# Patient Record
Sex: FEMALE | Race: WHITE | Employment: OTHER | URBAN - NONMETROPOLITAN AREA
[De-identification: names, ages, dates, MRNs, and addresses within clinical notes are randomized per-mention and may not be internally consistent; named-entity substitution may affect disease eponyms.]

---

## 2017-12-01 LAB
ALBUMIN SERPL-MCNC: 4.2 G/DL (ref 3.5–5.2)
ALP BLD-CCNC: 83 U/L (ref 35–104)
ALT SERPL-CCNC: 39 U/L (ref 5–33)
ANION GAP SERPL CALCULATED.3IONS-SCNC: 15 MMOL/L (ref 7–19)
AST SERPL-CCNC: 17 U/L (ref 5–32)
BILIRUB SERPL-MCNC: 0.7 MG/DL (ref 0.2–1.2)
BUN BLDV-MCNC: 10 MG/DL (ref 6–20)
CALCIUM SERPL-MCNC: 9.5 MG/DL (ref 8.6–10)
CHLORIDE BLD-SCNC: 104 MMOL/L (ref 98–111)
CO2: 26 MMOL/L (ref 22–29)
CREAT SERPL-MCNC: 0.9 MG/DL (ref 0.5–0.9)
GFR NON-AFRICAN AMERICAN: >60
GLUCOSE BLD-MCNC: 81 MG/DL (ref 74–109)
POTASSIUM SERPL-SCNC: 3.9 MMOL/L (ref 3.5–5)
SODIUM BLD-SCNC: 145 MMOL/L (ref 136–145)
TOTAL PROTEIN: 6.6 G/DL (ref 6.6–8.7)

## 2018-05-31 ENCOUNTER — OFFICE VISIT (OUTPATIENT)
Dept: NEUROLOGY | Age: 58
End: 2018-05-31
Payer: COMMERCIAL

## 2018-05-31 VITALS
SYSTOLIC BLOOD PRESSURE: 136 MMHG | BODY MASS INDEX: 34.2 KG/M2 | HEIGHT: 63 IN | DIASTOLIC BLOOD PRESSURE: 78 MMHG | WEIGHT: 193 LBS
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
## 2018-05-31 DIAGNOSIS — Z86.59 HISTORY OF DEPRESSION: ICD-10-CM

## 2018-05-31 PROCEDURE — 99204 OFFICE O/P NEW MOD 45 MIN: CPT | Performed by: PSYCHIATRY & NEUROLOGY

## 2018-05-31 RX ORDER — ALPRAZOLAM 0.5 MG/1
0.5 TABLET ORAL 2 TIMES DAILY
COMMUNITY

## 2018-05-31 RX ORDER — LEVOTHYROXINE SODIUM 0.05 MG/1
50 TABLET ORAL DAILY
COMMUNITY

## 2018-05-31 RX ORDER — MOMETASONE FUROATE 50 UG/1
2 SPRAY, METERED NASAL DAILY
COMMUNITY
End: 2018-09-14

## 2018-05-31 RX ORDER — BUPROPION HYDROCHLORIDE 150 MG/1
150 TABLET, EXTENDED RELEASE ORAL DAILY
COMMUNITY

## 2018-05-31 RX ORDER — DOXEPIN HYDROCHLORIDE 25 MG/1
25 CAPSULE ORAL NIGHTLY
COMMUNITY
End: 2018-09-14

## 2018-05-31 RX ORDER — DULOXETIN HYDROCHLORIDE 30 MG/1
30 CAPSULE, DELAYED RELEASE ORAL DAILY
COMMUNITY

## 2018-05-31 RX ORDER — BUSPIRONE HYDROCHLORIDE 5 MG/1
5 TABLET ORAL 2 TIMES DAILY
COMMUNITY
End: 2018-09-14

## 2018-05-31 RX ORDER — PREGABALIN 50 MG/1
50 CAPSULE ORAL 3 TIMES DAILY
COMMUNITY
End: 2018-09-14

## 2018-05-31 RX ORDER — IBUPROFEN 600 MG/1
600 TABLET ORAL EVERY 6 HOURS PRN
COMMUNITY
End: 2018-09-14

## 2018-05-31 RX ORDER — CLOTRIMAZOLE AND BETAMETHASONE DIPROPIONATE 10; .64 MG/G; MG/G
CREAM TOPICAL 2 TIMES DAILY
COMMUNITY
End: 2018-09-14

## 2018-05-31 RX ORDER — NICOTINE 21 MG/24HR
1 PATCH, TRANSDERMAL 24 HOURS TRANSDERMAL EVERY 24 HOURS
COMMUNITY
End: 2018-09-14

## 2018-07-13 ENCOUNTER — APPOINTMENT (OUTPATIENT)
Dept: GENERAL RADIOLOGY | Age: 58
End: 2018-07-13
Payer: MEDICARE

## 2018-07-13 ENCOUNTER — HOSPITAL ENCOUNTER (EMERGENCY)
Age: 58
Discharge: HOME | End: 2018-07-13
Attending: EMERGENCY MEDICINE
Payer: MEDICARE

## 2018-07-13 ENCOUNTER — OFFICE VISIT (OUTPATIENT)
Dept: URGENT CARE | Age: 58
End: 2018-07-13

## 2018-07-13 VITALS
DIASTOLIC BLOOD PRESSURE: 78 MMHG | SYSTOLIC BLOOD PRESSURE: 128 MMHG | WEIGHT: 195.6 LBS | TEMPERATURE: 98 F | BODY MASS INDEX: 33.39 KG/M2 | RESPIRATION RATE: 18 BRPM | OXYGEN SATURATION: 95 % | HEIGHT: 64 IN | HEART RATE: 120 BPM

## 2018-07-13 VITALS
TEMPERATURE: 98.2 F | BODY MASS INDEX: 34.55 KG/M2 | OXYGEN SATURATION: 95 % | HEART RATE: 74 BPM | HEIGHT: 63 IN | RESPIRATION RATE: 18 BRPM | SYSTOLIC BLOOD PRESSURE: 132 MMHG | WEIGHT: 195 LBS | DIASTOLIC BLOOD PRESSURE: 68 MMHG
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
## 2018-07-13 DIAGNOSIS — S89.92XA INJURY OF LEFT KNEE, INITIAL ENCOUNTER: ICD-10-CM

## 2018-07-13 DIAGNOSIS — N30.00 ACUTE CYSTITIS WITHOUT HEMATURIA: ICD-10-CM

## 2018-07-13 DIAGNOSIS — S81.012A: Primary | ICD-10-CM

## 2018-07-13 DIAGNOSIS — S81.012A LACERATION OF LEFT KNEE, INITIAL ENCOUNTER: ICD-10-CM

## 2018-07-13 LAB
BACTERIA: NEGATIVE /HPF
BILIRUBIN URINE: NEGATIVE
BLOOD, URINE: NEGATIVE
CLARITY: CLEAR
COLOR: YELLOW
EPITHELIAL CELLS, UA: 2 /HPF (ref 0–5)
GLUCOSE URINE: NEGATIVE MG/DL
HYALINE CASTS: 1 /HPF (ref 0–8)
KETONES, URINE: NEGATIVE MG/DL
LEUKOCYTE ESTERASE, URINE: ABNORMAL
NITRITE, URINE: NEGATIVE
PH UA: 5.5
PROTEIN UA: NEGATIVE MG/DL
RBC UA: 2 /HPF (ref 0–4)
SPECIFIC GRAVITY UA: 1.01
URINE REFLEX TO CULTURE: YES
UROBILINOGEN, URINE: 0.2 E.U./DL
WBC UA: 17 /HPF (ref 0–5)

## 2018-07-13 PROCEDURE — 73560 X-RAY EXAM OF KNEE 1 OR 2: CPT

## 2018-07-13 PROCEDURE — 12005 RPR S/N/A/GEN/TRK12.6-20.0CM: CPT | Performed by: EMERGENCY MEDICINE

## 2018-07-13 PROCEDURE — 87086 URINE CULTURE/COLONY COUNT: CPT

## 2018-07-13 PROCEDURE — 99283 EMERGENCY DEPT VISIT LOW MDM: CPT

## 2018-07-13 PROCEDURE — 2500000003 HC RX 250 WO HCPCS: Performed by: EMERGENCY MEDICINE

## 2018-07-13 PROCEDURE — 99999 PR OFFICE/OUTPT VISIT,PROCEDURE ONLY: CPT | Performed by: NURSE PRACTITIONER

## 2018-07-13 PROCEDURE — 87186 SC STD MICRODIL/AGAR DIL: CPT

## 2018-07-13 PROCEDURE — 81001 URINALYSIS AUTO W/SCOPE: CPT

## 2018-07-13 PROCEDURE — 12005 RPR S/N/A/GEN/TRK12.6-20.0CM: CPT

## 2018-07-13 PROCEDURE — 99284 EMERGENCY DEPT VISIT MOD MDM: CPT | Performed by: EMERGENCY MEDICINE

## 2018-07-13 PROCEDURE — 6360000002 HC RX W HCPCS: Performed by: EMERGENCY MEDICINE

## 2018-07-13 PROCEDURE — 6370000000 HC RX 637 (ALT 250 FOR IP): Performed by: EMERGENCY MEDICINE

## 2018-07-13 RX ORDER — OXYCODONE HYDROCHLORIDE 5 MG/1
5 TABLET ORAL ONCE
Status: COMPLETED | OUTPATIENT
Start: 2018-07-13 | End: 2018-07-13

## 2018-07-13 RX ORDER — CEFUROXIME AXETIL 250 MG/1
500 TABLET ORAL ONCE
Status: COMPLETED | OUTPATIENT
Start: 2018-07-13 | End: 2018-07-13

## 2018-07-13 RX ORDER — LIDOCAINE HYDROCHLORIDE AND EPINEPHRINE 10; 10 MG/ML; UG/ML
20 INJECTION, SOLUTION INFILTRATION; PERINEURAL ONCE
Status: COMPLETED | OUTPATIENT
Start: 2018-07-13 | End: 2018-07-13

## 2018-07-13 RX ORDER — CEFUROXIME AXETIL 500 MG/1
500 TABLET ORAL 2 TIMES DAILY
Qty: 14 TABLET | Refills: 0 | Status: SHIPPED | OUTPATIENT
Start: 2018-07-13 | End: 2018-07-20

## 2018-07-13 RX ADMIN — METHYLENE BLUE 50 MG: 5 INJECTION INTRAVENOUS at 12:50

## 2018-07-13 RX ADMIN — CEFUROXIME AXETIL 500 MG: 250 TABLET ORAL at 13:41

## 2018-07-13 RX ADMIN — LIDOCAINE HYDROCHLORIDE AND EPINEPHRINE 30 ML: 10; 10 INJECTION, SOLUTION INFILTRATION; PERINEURAL at 12:50

## 2018-07-13 RX ADMIN — OXYCODONE HYDROCHLORIDE 5 MG: 5 TABLET ORAL at 12:50

## 2018-07-13 ASSESSMENT — ENCOUNTER SYMPTOMS
DIARRHEA: 0
ABDOMINAL PAIN: 0
PHOTOPHOBIA: 0
RHINORRHEA: 0
VOMITING: 0
SHORTNESS OF BREATH: 0
COUGH: 0
CHEST TIGHTNESS: 0
NAUSEA: 0
EYE PAIN: 0
SORE THROAT: 0
BACK PAIN: 0

## 2018-07-13 ASSESSMENT — PAIN SCALES - GENERAL
PAINLEVEL_OUTOF10: 5
PAINLEVEL_OUTOF10: 3

## 2018-07-13 NOTE — ED PROVIDER NOTES
dysuria, flank pain and hematuria. Musculoskeletal: Positive for arthralgias. Negative for back pain, myalgias and neck pain. Skin: Positive for wound. Negative for rash. Neurological: Negative for dizziness, syncope, speech difficulty, weakness, light-headedness and headaches. Psychiatric/Behavioral: Negative for confusion, hallucinations and suicidal ideas. PAST MEDICAL HISTORY     Past Medical History:   Diagnosis Date    Anxiety     Depression     Fibromyalgia     Hypothyroidism     Insomnia        SURGICAL HISTORY       Past Surgical History:   Procedure Laterality Date    BREAST LUMPECTOMY      COLONOSCOPY      LITHOTRIPSY         CURRENT MEDICATIONS       Discharge Medication List as of 7/13/2018  3:05 PM      CONTINUE these medications which have NOT CHANGED    Details   doxepin (SINEQUAN) 25 MG capsule Take 25 mg by mouth nightlyHistorical Med      ibuprofen (ADVIL;MOTRIN) 600 MG tablet Take 600 mg by mouth every 6 hours as needed for PainHistorical Med      ALPRAZolam (XANAX) 0.5 MG tablet Take 0.5 mg by mouth 3 times daily. Clerance Riis Historical Med      pregabalin (LYRICA) 50 MG capsule Take 50 mg by mouth 3 times daily. Clerance Riis Historical Med      DULoxetine (CYMBALTA) 30 MG extended release capsule Take 30 mg by mouth dailyHistorical Med      clotrimazole-betamethasone (LOTRISONE) 1-0.05 % cream Apply topically 2 times daily Apply topically 2 times daily. , Topical, 2 TIMES DAILY, Historical Med      busPIRone (BUSPAR) 5 MG tablet Take 5 mg by mouth 2 times dailyHistorical Med      nicotine (NICODERM CQ) 21 MG/24HR Place 1 patch onto the skin every 24 hoursHistorical Med      buPROPion (WELLBUTRIN SR) 150 MG extended release tablet Take 150 mg by mouth dailyHistorical Med      levothyroxine (SYNTHROID) 50 MCG tablet Take 50 mcg by mouth DailyHistorical Med      mometasone (NASONEX) 50 MCG/ACT nasal spray 2 sprays by Nasal route daily, Nasal, DAILY, Historical Med             ALLERGIES Clindamycin/lincomycin    FAMILY HISTORY       Family History   Problem Relation Age of Onset    Heart Attack Sister     Diabetes Paternal Grandfather        SOCIAL HISTORY       Social History     Social History    Marital status:      Spouse name: N/A    Number of children: N/A    Years of education: N/A     Social History Main Topics    Smoking status: Current Every Day Smoker     Packs/day: 1.50     Types: Cigarettes    Smokeless tobacco: Never Used    Alcohol use No    Drug use: No    Sexual activity: Not Asked     Other Topics Concern    None     Social History Narrative    None       SCREENINGS           PHYSICAL EXAM    (up to 7 for level 4, 8 or more for level 5)     ED Triage Vitals [07/13/18 1154]   BP Temp Temp src Pulse Resp SpO2 Height Weight   125/60 98.2 °F (36.8 °C) -- 82 18 96 % 5' 3\" (1.6 m) 195 lb (88.5 kg)       Physical Exam   Constitutional: She is oriented to person, place, and time. She appears well-developed and well-nourished. No distress. HENT:   Head: Normocephalic and atraumatic. Mouth/Throat: Oropharynx is clear and moist.   No hemotympanum, no septal hematoma, no midface instability concerning for LeFort fracture. Eyes: EOM are normal. Pupils are equal, round, and reactive to light. No scleral icterus. Neck: Normal range of motion. Neck supple. No tracheal deviation present. No cervical spinous process tenderness, step-offs, deformities   Cardiovascular: Normal rate, regular rhythm, normal heart sounds and intact distal pulses. Exam reveals no gallop and no friction rub. No murmur heard. Pulmonary/Chest: Effort normal and breath sounds normal. No respiratory distress. She has no wheezes. She has no rales. She exhibits no tenderness. Abdominal: Soft. She exhibits no distension and no mass. There is no tenderness. There is no rebound and no guarding. Musculoskeletal: Normal range of motion. She exhibits tenderness (Around the knee).  She exhibits Options  Diagnosis management comments: 78-year-old female with a large laceration after a mechanical fall. Plan for x-ray to rule out open fracture. Joint injection with methylene blue to evaluate for any joint violation. Pain control and laceration repair. Patient tetanus up-to-date. ED Course  She was evaluated for her fall. She had an obvious approximately 20-25 cm horizontal laceration on her left knee. This was down to the fossa. The extensor mechanism was intact. X-rays were obtained to rule out any open fracture. No evidence of fracture. The knee was evaluated for violation of the joint space. No evidence of that. Wound was closed after copious irrigation. See procedure note for details. Patient was then given a knee immobilizer and had her wound dressed. Friend provided patient with a walker. She was found to have a urinary tract infection was started on antibiotics. Discharged home in improved condition. Patient is well-appearing, stable for discharge and follow-up without fail with his/her medical doctor. I had a detailed discussion with the patient and/or guardian regarding the historical points, exam findings, and any diagnostic results supporting the discharge diagnosis. The patient was educated on care and need for follow-up. Strict return instructions including red flag signs and symptoms were discussed with the patient. Medications for discharge discussed, and adverse effects reviewed. Questions invited and answered. Patient shows understanding of the discharge information and agrees to follow-up. CONSULTS:  None    PROCEDURES:  Unless otherwise noted below, none     Lac Repair  Date/Time: 7/13/2018 3:25 PM  Performed by: Onesimo Simmons  Authorized by: Onesimo Simmons     Comments: The wound was irrigated with 2 L of sterile saline, it was washed with Shur-Clens.  The patient was positioned in the seated position with her knee at 90° 3 mL of methylene blue or injected into the joint from the lateral inferior position. There was no leaking of methylene blue from the joint. The patient was then put in the supine position with her knee extended. He was anesthetized with 15 mL of 1% lidocaine with epinephrine. 13 3-0 nylon horizontal interrupted mattress sutures were placed to reapproximate the wound. Possibly 20-25 cm in length total He tolerated the procedure well. No immediate complications. The wound was dressed and the patient was put in a knee immobilizer. FINAL IMPRESSION      1. Fall, initial encounter    2. Laceration of left knee, initial encounter    3. Injury of left knee, initial encounter    4. Acute cystitis without hematuria          DISPOSITION/PLAN   DISPOSITION Decision To Discharge 07/13/2018 03:00:29 PM      PATIENT REFERRED TO:  Mima Cross [address]  [address] [facility]  [phone]    Call today  The next available appointment.       DISCHARGE MEDICATIONS:  Discharge Medication List as of 7/13/2018  3:05 PM      START taking these medications    Details   cefUROXime (CEFTIN) 500 MG tablet Take 1 tablet by mouth 2 times daily for 7 days, Disp-14 tablet, R-0Print                (Please note that portions of this note were completed with a voice recognition program.  Efforts were made to edit the dictations but occasionally words are mis-transcribed.)    Janki Lopez MD (electronically signed)  Attending Emergency Physician          Janki Lopez MD  07/13/18 5778

## 2018-07-13 NOTE — PROGRESS NOTES
Per patient, she fell in lobby of 1st floor and has a cut on her knee. Patient states that she has been falling a lot lately, was recently in the hospital for it. States that she did not trip or slip on anything in the main lobby. Per patient, did not hit her head or lose conciousness. Got patient's vital signs and took her to a room. Looked at laceration and called provider, Pedro Officer, into room d/t severity of laceration. Laceration was bandaged and the ambulance was called.  Patient was transported to E. via [facility], [facility]

## 2018-07-15 LAB
ORGANISM: ABNORMAL
URINE CULTURE, ROUTINE: ABNORMAL
URINE CULTURE, ROUTINE: ABNORMAL

## 2018-09-14 ENCOUNTER — OFFICE VISIT (OUTPATIENT)
Dept: NEUROLOGY | Age: 58
End: 2018-09-14
Payer: MEDICARE

## 2018-09-14 VITALS
WEIGHT: 196 LBS | SYSTOLIC BLOOD PRESSURE: 105 MMHG | HEIGHT: 64 IN | DIASTOLIC BLOOD PRESSURE: 62 MMHG | BODY MASS INDEX: 33.46 KG/M2
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
## 2018-09-14 DIAGNOSIS — G20 PARKINSONISM, UNSPECIFIED PARKINSONISM TYPE (HCC): ICD-10-CM

## 2018-09-14 DIAGNOSIS — R27.0 ATAXIA: ICD-10-CM

## 2018-09-14 PROCEDURE — G8417 CALC BMI ABV UP PARAM F/U: HCPCS | Performed by: PSYCHIATRY & NEUROLOGY

## 2018-09-14 PROCEDURE — G8427 DOCREV CUR MEDS BY ELIG CLIN: HCPCS | Performed by: PSYCHIATRY & NEUROLOGY

## 2018-09-14 PROCEDURE — 4004F PT TOBACCO SCREEN RCVD TLK: CPT | Performed by: PSYCHIATRY & NEUROLOGY

## 2018-09-14 PROCEDURE — 3017F COLORECTAL CA SCREEN DOC REV: CPT | Performed by: PSYCHIATRY & NEUROLOGY

## 2018-09-14 PROCEDURE — 99214 OFFICE O/P EST MOD 30 MIN: CPT | Performed by: PSYCHIATRY & NEUROLOGY

## 2018-09-14 RX ORDER — DOXEPIN HYDROCHLORIDE 10 MG/1
10 CAPSULE ORAL NIGHTLY
COMMUNITY
End: 2018-12-14

## 2018-09-14 RX ORDER — ATORVASTATIN CALCIUM 40 MG/1
40 TABLET, FILM COATED ORAL DAILY
COMMUNITY

## 2018-09-14 RX ORDER — PREGABALIN 75 MG/1
75 CAPSULE ORAL 3 TIMES DAILY
Qty: 30 CAPSULE | Refills: 2 | Status: SHIPPED | OUTPATIENT
Start: 2018-09-14 | End: 2018-12-14

## 2018-09-14 RX ORDER — OMEPRAZOLE 20 MG/1
20 CAPSULE, DELAYED RELEASE ORAL 2 TIMES DAILY
COMMUNITY

## 2018-09-14 RX ORDER — BUSPIRONE HYDROCHLORIDE 30 MG/1
30 TABLET ORAL DAILY
COMMUNITY

## 2018-09-14 RX ORDER — PREGABALIN 150 MG/1
150 CAPSULE ORAL NIGHTLY
COMMUNITY

## 2018-09-14 NOTE — PROGRESS NOTES
Chief Complaint   Patient presents with    3 Month Follow-Up     i have fallen 1 time since my last visit       Benjamin Brooke is a 59y.o. year old female who is seen for evaluation of Her recent falls and possibly PSP. Her mother had progressive supranuclear palsy and the patient shows some similar signs according to her son. Our patient however is not showing a slow steady decline necessarily but seems to have intermittent spurts of it. She has been falling for several years. More recently she is having trouble getting back on her feet. Doesn't appear to have any memory difficulties. Has chronic depression and took Seroquel for a few years but has been off of it for the past 2 or 3. She does have some slowness of movement and some urinary urgency but no clear shuffling gait. She had an MRI of the head which showed minimal white matter change only. No ventriculomegaly. She otherwise denies any focal signs or symptoms. Records are reviewed. We had a long talk regarding all the above. He has recently had several of her medications reduced and appears to be walking better and falling less. Takes Lyrica, Xanax, Sinequan, BuSpar and Cymbalta. She had a particularly bad fall in July of this year with a laceration to the left knee requiring 13 sutures. She really has no idea how she fell. He did not appear to pass out.   Active Ambulatory Problems     Diagnosis Date Noted    No Active Ambulatory Problems     Resolved Ambulatory Problems     Diagnosis Date Noted    No Resolved Ambulatory Problems     Past Medical History:   Diagnosis Date    Anxiety     Depression     Fibromyalgia     Hypothyroidism     Insomnia        Past Surgical History:   Procedure Laterality Date    BREAST LUMPECTOMY      COLONOSCOPY      LITHOTRIPSY         Family History   Problem Relation Age of Onset    Heart Attack Sister     Diabetes Paternal Grandfather        Allergies   Allergen Reactions    Clindamycin/Lincomycin Current Outpatient Prescriptions   Medication Sig Dispense Refill    atorvastatin (LIPITOR) 40 MG tablet Take 40 mg by mouth daily      doxepin (SINEQUAN) 10 MG capsule Take 10 mg by mouth nightly      busPIRone (BUSPAR) 30 MG tablet Take 30 mg by mouth daily Takes 1 1/5 pills twice daily      metFORMIN (GLUCOPHAGE) 500 MG tablet Take 500 mg by mouth 2 times daily (with meals)      omeprazole (PRILOSEC) 20 MG delayed release capsule Take 20 mg by mouth 2 times daily      pregabalin (LYRICA) 150 MG capsule Take 150 mg by mouth nightly. Santino Bolk aspirin 81 MG tablet Take 81 mg by mouth daily      pregabalin (LYRICA) 75 MG capsule Take 1 capsule by mouth 3 times daily for 30 days. . 30 capsule 2    ALPRAZolam (XANAX) 0.5 MG tablet Take 0.5 mg by mouth 2 times daily. Takes 1 tablet twice daily and 2 pills at night.  DULoxetine (CYMBALTA) 30 MG extended release capsule Take 30 mg by mouth daily Takes 2 pills AM and 1 pill at noon      buPROPion (WELLBUTRIN SR) 150 MG extended release tablet Take 150 mg by mouth daily Takes 2 pills AM and 1 pill at noon      levothyroxine (SYNTHROID) 50 MCG tablet Take 50 mcg by mouth Daily       No current facility-administered medications for this visit. Outpatient Prescriptions Marked as Taking for the 9/14/18 encounter (Office Visit) with Morris Ashraf MD   Medication Sig Dispense Refill    atorvastatin (LIPITOR) 40 MG tablet Take 40 mg by mouth daily      doxepin (SINEQUAN) 10 MG capsule Take 10 mg by mouth nightly      busPIRone (BUSPAR) 30 MG tablet Take 30 mg by mouth daily Takes 1 1/5 pills twice daily      metFORMIN (GLUCOPHAGE) 500 MG tablet Take 500 mg by mouth 2 times daily (with meals)      omeprazole (PRILOSEC) 20 MG delayed release capsule Take 20 mg by mouth 2 times daily      pregabalin (LYRICA) 150 MG capsule Take 150 mg by mouth nightly. Santino Bolk aspirin 81 MG tablet Take 81 mg by mouth daily      pregabalin (LYRICA) 75 MG capsule Take 1 capsule by mouth 3 times daily for 30 days. . 30 capsule 2    ALPRAZolam (XANAX) 0.5 MG tablet Take 0.5 mg by mouth 2 times daily. Takes 1 tablet twice daily and 2 pills at night.  DULoxetine (CYMBALTA) 30 MG extended release capsule Take 30 mg by mouth daily Takes 2 pills AM and 1 pill at noon      buPROPion (WELLBUTRIN SR) 150 MG extended release tablet Take 150 mg by mouth daily Takes 2 pills AM and 1 pill at noon      levothyroxine (SYNTHROID) 50 MCG tablet Take 50 mcg by mouth Daily         /62   Ht 5' 4\" (1.626 m)   Wt 196 lb (88.9 kg)   BMI 33.64 kg/m²       Constitutional  well developed, well nourished. Eyes  conjunctiva normal.  Pupils react to light  Ear, nose, throat -hearing intact to finger rub No scars, masses, or lesions over external nose or ears, no atrophy of tongue  Neck-symmetric, no masses noted, no jugular vein distension. No bruits noted. Respiration- chest wall appears symmetric, good expansion,   normal effort without use of accessory muscles  Cardiovascular- RRR  Musculoskeletal  no significant wasting of muscles noted, no bony deformities, gait no gross ataxia  Extremities-no clubbing, cyanosis or edema  Skin  warm, dry, and intact. No rash, erythema, or pallor. Psychiatric  mood, affect, and behavior appear normal.      Neurological exam  Awake, alert, fluent oriented x 3 appropriate affect  Attention and concentration appear appropriate  Recent and remote memory appears unremarkable  Speech normal without dysarthria  No clear issues with language of fund of knowledge    Cranial Nerve Exam   CN II- Visual fields grossly unremarkable. VA adequate. Discs sharp  CN III, IV,VI- PERRLA, EOMI, No nystagmus, conjugate eye movements, no ptosis  CN V-sensation intact to LT over face  CN VII-no facial asymmetry.  Masked face  CN VIII-Hearing intact   CN IX and X- Palate elevates in midline  CN XI-good shoulder shrug  CN XII-Tongue midline with no fasciculations or

## 2018-09-14 NOTE — PROGRESS NOTES

## 2018-09-21 ENCOUNTER — TRANSCRIBE ORDERS (OUTPATIENT)
Dept: PHYSICAL THERAPY | Facility: HOSPITAL | Age: 58
End: 2018-09-21

## 2018-09-21 DIAGNOSIS — M62.81 GENERALIZED MUSCLE WEAKNESS: Primary | ICD-10-CM

## 2018-10-03 ENCOUNTER — HOSPITAL ENCOUNTER (OUTPATIENT)
Dept: PHYSICAL THERAPY | Facility: HOSPITAL | Age: 58
Setting detail: THERAPIES SERIES
Discharge: HOME | End: 2018-10-03

## 2018-10-03 DIAGNOSIS — M62.81 GENERALIZED MUSCLE WEAKNESS: Primary | ICD-10-CM

## 2018-10-03 PROCEDURE — 97110 THERAPEUTIC EXERCISES: CPT | Performed by: PHYSICAL THERAPIST

## 2018-10-03 PROCEDURE — G8978 MOBILITY CURRENT STATUS: HCPCS | Performed by: PHYSICAL THERAPIST

## 2018-10-03 PROCEDURE — 97161 PT EVAL LOW COMPLEX 20 MIN: CPT | Performed by: PHYSICAL THERAPIST

## 2018-10-03 PROCEDURE — G8979 MOBILITY GOAL STATUS: HCPCS | Performed by: PHYSICAL THERAPIST

## 2018-10-03 NOTE — THERAPY EVALUATION
Outpatient Physical Therapy Ortho Initial Evaluation  [facility]     Patient Name: Denisa Michelle  : 1960  MRN: [id number]  Today's Date: 10/3/2018      Visit Date: 10/03/2018     Subjective Improvement: N/A      Attendance:   Approved:    Medicare guidelines       MD follow up:    LILIA DEMARCO date:    10/24/18       There is no problem list on file for this patient.       Past Medical History:   Diagnosis Date   • Breast cancer (CMS/HCC)    • Diabetes mellitus (CMS/HCC)    • Rhabdomyolysis         Past Surgical History:   Procedure Laterality Date   • BREAST LUMPECTOMY     • CHOLECYSTECTOMY       Allergies   Allergen Reactions   • Clindamycin/Lincomycin Hives     Medications:  Duloxetine  Atorvastatin  Bupropion  Doxepin  Buspirone  Levothyroxine  Alprazolam  Metformin  Omeprazole  Lyrica  Low dose aspirin      Visit Dx:     ICD-10-CM ICD-9-CM   1. Generalized muscle weakness M62.81 728.87             Patient History     Row Name 10/03/18 1100             History    Chief Complaint Balance Problems;Muscle weakness;Difficulty Walking  -KG      Date Current Problem(s) Began --   ~ 2 years ago  -KG      Brief Description of Current Complaint Pt present with increased hx of falls and overall balance deficits. Pt states a couple of times she has fallen in the bathroom and couldn't get up. Had to call EMS to help get her up. States that happened one time in May 2018 and [place] [facility]. Pt lives alone in a single level home, no steps to enter. States she does have trouble with ascending/descending stairs and has to use a railing. Pt states she does not use an AD.   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  -KG         Pain     What Performance Factors Make the  Current Problem(s) WORSE? Ascending/descending stairs, wakling at times due to impaired balance, gets tired easily while performing housework, etc.  -KG      Pain Comments Pt reports she just has generalized aches/pains in her hips. No pain currently. States she received a steroid shot for her shin splints yesterday and they feel better.  -KG      Is your sleep disturbed? No  -KG      Difficulties at work? N/A  -KG      Difficulties with ADL's? Independent  -KG      Difficulties with recreational activities? N/A  -KG         Fall Risk Assessment    Any falls in the past year: Yes  -KG      Number of falls reported in the last 12 months 6 falls since May; 8-9 total this year  -KG      Factors that contributed to the fall: Lost balance   Pt states she never knows when she's going to fall  -KG        User Key  (r) = Recorded By, (t) = Taken By, (c) = Cosigned By    Initials Name Provider Type    KG Christiana Schwarz, PT Physical Therapist                PT Ortho     Row Name 10/03/18 1100       Subjective Comments    Subjective Comments Refer to therapy pt history for details.   -KG       Precautions and Contraindications    Precautions/Limitations fall precautions  -KG       Subjective Pain    Able to rate subjective pain? yes  -KG    Pre-Treatment Pain Level 0  -KG       Posture/Observations    Posture/Observations Comments No acute distress. Ambulates with no AD, slight antalgic gait. Rounded shoulders, forward head posture. Pt demonstrates poor/fair skin integrity overall. Multiple skin tears on BUE's, bandages covering some.  -KG       General ROM    GENERAL ROM COMMENTS Bilateral hip & knee AROM WNL without increased pain  -KG       MMT (Manual Muscle Testing)    Rt Lower Ext Rt Hip Flexion;Rt Hip ABduction;Rt Hip ADduction;Rt Knee Extension;Rt Knee Flexion  -KG    Lt Lower Ext Lt Hip Flexion;Lt Hip ABduction;Lt Hip ADduction;Lt Knee Extension;Lt Knee Flexion  -KG       MMT Right Lower Ext    Rt Hip Flexion MMT,  "Gross Movement (4/5) good  -KG    Rt Hip ABduction MMT, Gross Movement (4-/5) good minus  -KG    Rt Hip ADduction MMT, Gross Movement (4/5) good  -KG    Rt Knee Extension MMT, Gross Movement (4+/5) good plus  -KG    Rt Knee Flexion MMT, Gross Movement (4+/5) good plus  -KG       MMT Left Lower Ext    Lt Hip Flexion MMT, Gross Movement (4/5) good  -KG    Lt Hip ABduction MMT, Gross Movement (4-/5) good minus  -KG    Lt Hip ADduction MMT, Gross Movement (4/5) good  -KG    Lt Knee Extension MMT, Gross Movement (4+/5) good plus  -KG    Lt Knee Flexion MMT, Gross Movement (4+/5) good plus  -KG       Sensation    Sensation WNL? WNL  -KG    Light Touch No apparent deficits  -KG       Balance Skills Training    Balance Comments FA/EO 30\", FA/EC 30\", FT/EO 30\", FT/EC 30\" increased post lean/sway. Airex: FA/EC & FA/EO 30\", FT/EO 30\", FT/EC 25\" mod sway. Tandem R/L lead 30\", airex tandem L lead 15\", R lead 12\"  -KG      User Key  (r) = Recorded By, (t) = Taken By, (c) = Cosigned By    Initials Name Provider Type    Christiana Mccracken, PT Physical Therapist                      Therapy Education  Education Details: POC education. HEP: sit to stand, standing CR/TR, standing march, standing hip abd  Given: HEP, Symptoms/condition management, Posture/body mechanics, Fall prevention and home safety, Mobility training  Program: New  How Provided: Verbal, Demonstration, Written  Provided to: Patient  Level of Understanding: Teach back education performed, Verbalized, Demonstrated        PT OP Goals     Row Name 10/03/18 1100          PT Short Term Goals    STG Date to Achieve 10/24/18  -KG     STG 1 Independent/compliant with HEP  -KG     STG 1 Progress New  -KG     STG 2 Tolerate 45 minute treatment session without increased pain  -KG     STG 2 Progress New  -KG     STG 3 Improve DGI score to 20/24 to demonstrate decreased fall risk and improved safety with functional mobility  -KG     STG 3 Progress New  -KG     STG 4 Demonstrate " "bilateral hip flex MMT to 4+/5  -KG     STG 4 Progress New  -KG     STG 5 Demonstrate airex FT/EC static stance for 30\" or greater  -KG     STG 5 Progress New  -KG        Long Term Goals    LTG Date to Achieve 11/07/18  -KG     LTG 1 Subjectively report 60% improvement or greater  -KG     LTG 1 Progress New  -KG     LTG 2 Improve DGI score to 22/24 to demonstrate decreased fall risk and improved safety with functional mobility  -KG     LTG 2 Progress New  -KG     LTG 3 Perform Tandem R/L leading on airex for 25\" or greater  -KG     LTG 3 Progress New  -KG     LTG 4 Demonstrate bilateral hip abd MMT to 4+5 or greater  -KG     LTG 4 Progress New  -KG     LTG 5 Ascend/descend 5 steps x 5, reciprocal pattern, min to no UE handrail assist  -KG     LTG 5 Progress New  -KG        Time Calculation    PT Goal Re-Cert Due Date 10/24/18  -KG       User Key  (r) = Recorded By, (t) = Taken By, (c) = Cosigned By    Initials Name Provider Type    KG Christiana Schwarz, PT Physical Therapist               PT Assessment/Plan     Row Name 10/03/18 1100          PT Assessment    Functional Limitations Decreased safety during functional activities;Impaired locomotion;Limitation in home management;Limitations in community activities;Limitations in functional capacity and performance;Performance in leisure activities  -KG     Impairments Balance;Gait;Impaired flexibility;Impaired muscle endurance;Muscle strength;Posture;Pain  -KG     Assessment Comments Pt is a 58 y.o. female presenting with hx of frequent falls due to balance/gait deficits. Pt reports she has fallen 6 times since May 2018. Pt spent time at [facility] swing bed unit due to weakness/dx of rhabdomyolysis during the month of May. Pt presents with an overall decrease in BLE strength & endurance/activity tolerance, limiting performance of functional mobility. Pt will benefit from skilled PT services to address these deficits for improvements in functional strength, balance, & " performance with functional mobility to decrease fall risk and return pt to highest level of function.  -KG     Please refer to paper survey for additional self-reported information Yes  -KG     Rehab Potential Good  -KG     Patient/caregiver participated in establishment of treatment plan and goals Yes  -KG     Patient would benefit from skilled therapy intervention Yes  -KG        PT Plan    PT Frequency 2x/week  -KG     Predicted Duration of Therapy Intervention (Therapy Eval) 4-6 weeks  -KG     Planned CPT's? PT EVAL LOW COMPLEXITY: 77813;PT RE-EVAL: 07401;PT THER PROC EA 15 MIN: 48953;PT THER ACT EA 15 MIN: 24112;PT MANUAL THERAPY EA 15 MIN: 01597;PT NEUROMUSC RE-EDUCATION EA 15 MIN: 89803;PT SELF CARE/HOME MGMT/TRAIN EA 15: 94435;PT THER SUPP EA 15 MIN  -KG     Physical Therapy Interventions (Optional Details) balance training;gait training;home exercise program;lumbar stabilization;manual therapy techniques;modalities;neuromuscular re-education;patient/family education;postural re-education;strengthening  -KG     PT Plan Comments Overall bilateral LE strengthening, dynamic balance activities to include obstacle course negotiation & dual task activities. Improve standing functional stability.  -KG       User Key  (r) = Recorded By, (t) = Taken By, (c) = Cosigned By    Initials Name Provider Type    KG Christiana Schwarz, PT Physical Therapist                Modalities     Row Name 10/03/18 1100             Subjective Pain    Post-Treatment Pain Level 0  -KG        User Key  (r) = Recorded By, (t) = Taken By, (c) = Cosigned By    Initials Name Provider Type    KG Christiana Schwarz, PT Physical Therapist              Exercises     Row Name 10/03/18 1100             Subjective Comments    Subjective Comments Refer to therapy pt history for details.   -KG         Subjective Pain    Able to rate subjective pain? yes  -KG      Pre-Treatment Pain Level 0  -KG      Post-Treatment Pain Level 0  -KG         Aquatics     Aquatics performed? No  -KG         Exercise 1    Exercise Name 1 Sit to stand no UE support; airex in chair  -KG      Cueing 1 Verbal  -KG      Sets 1 2  -KG      Reps 1 10  -KG         Exercise 2    Exercise Name 2 Standing CR/TR  -KG      Cueing 2 Verbal  -KG      Sets 2 2  -KG      Reps 2 10  -KG         Exercise 3    Exercise Name 3 Standing marching  -KG      Cueing 3 Verbal  -KG      Sets 3 2  -KG      Reps 3 10  -KG         Exercise 4    Exercise Name 4 Standing hip abd  -KG      Cueing 4 Verbal  -KG      Sets 4 2  -KG      Reps 4 10  -KG        User Key  (r) = Recorded By, (t) = Taken By, (c) = Cosigned By    Initials Name Provider Type    Christiana Mccracken, PT Physical Therapist                        Outcome Measure Options: Lower Extremity Functional Scale (LEFS), Dynamic Gait Index  Dynamic Gait Index (DGI)  Gait Level Surface: Normal: walks 20’, no assistive devices, good speed, no evidence for imbalance, normal gait pattern  Change in Gait Speed: Normal: Able to smoothly change walking speed without loss of balance or gait deviation. Shows significant difference in walking speeds between normal, fast and slow paces.  Gait with Horizontal Head Turns: Mild impairment: Performs head turns smoothly with slight change in gait velocity, i.e. minor disruption to smooth gait path or uses walking aid.  Gait with Vertical Head Turns: Mild impairment: Performs head turns smoothly with slight change in gait velocity, i.e. minor disruption to smooth gait path or uses walking aid.  Gait and Pivot Turn: Mild impairment: pivot turns safely in >3 seconds and stops with no loss of balance.  Step Over Obstacle: Mild impairment: Is able to step over shoe box, but must slow down and adjust steps to clear box safely.  Step Around Obstacles: Mild impairment: Is able to step around both cones, but must slow down and adjust steps to clear cones.  Steps: Mild impairment: Alternating feet, must use rail.  Dynamic Gait Index  Score: 18  Lower Extremity Functional Index  Any of your usual work, housework or school activities: Quite a bit of difficulty  Your usual hobbies, recreational or sporting activities: Moderate difficulty  Getting into or out of the bath: Moderate difficulty  Walking between rooms: No difficulty  Putting on your shoes or socks: Quite a bit of difficulty  Squatting: Extreme difficulty or unable to perform activity  Lifting an object, like a bag of groceries from the floor: Quite a bit of difficulty  Performing light activities around your home: Moderate difficulty  Performing heavy activities around your home: Extreme difficulty or unable to perform activity  Getting into or out of a car: Moderate difficulty  Walking 2 blocks: Quite a bit of difficulty  Walking a mile: Extreme difficulty or unable to perform activity  Going up or down 10 stairs (about 1 flight of stairs): Extreme difficulty or unable to perform activity  Standing for 1 hour: Quite a bit of difficulty  Sitting for 1 hour: Extreme difficulty or unable to perform activity  Running on even ground: Extreme difficulty or unable to perform activity  Running on uneven ground: Extreme difficulty or unable to perform activity  Making sharp turns while running fast: Extreme difficulty or unable to perform activity  Hopping: Extreme difficulty or unable to perform activity  Rolling over in bed: Moderate difficulty  Total: 19      Time Calculation:     Therapy Suggested Charges     Code   Minutes Charges    None             Start Time: 1108  Stop Time: 1156  Time Calculation (min): 48 min  Total Timed Code Minutes- PT: 15 minute(s)     Therapy Charges for Today     Code Description Service Date Service Provider Modifiers Qty    67985368001 HC PT MOBILITY CURRENT 10/3/2018 Christiana Schwarz, PT GP, CK 1    00109443851 HC PT MOBILITY PROJECTED 10/3/2018 Christiana Schwarz, PT GP, CJ 1    40261483697 HC PT EVAL LOW COMPLEXITY 2 10/3/2018 Christiana Schwarz, PT GP 1     [id number]  PT THER PROC EA 15 MIN 10/3/2018 Christiana Schwarz, PT GP 1          PT G-Codes  PT Professional Judgement Used?: Yes  Outcome Measure Options: Lower Extremity Functional Scale (LEFS), Dynamic Gait Index  Total: 19  Functional Limitation: Mobility: Walking and moving around  Mobility: Walking and Moving Around Current Status (): At least 40 percent but less than 60 percent impaired, limited or restricted  Mobility: Walking and Moving Around Goal Status (): At least 20 percent but less than 40 percent impaired, limited or restricted         Christiana Schwarz, PT  10/3/2018

## 2018-10-10 ENCOUNTER — HOSPITAL ENCOUNTER (OUTPATIENT)
Dept: PHYSICAL THERAPY | Facility: HOSPITAL | Age: 58
Setting detail: THERAPIES SERIES
Discharge: HOME | End: 2018-10-10

## 2018-10-10 DIAGNOSIS — M62.81 GENERALIZED MUSCLE WEAKNESS: Primary | ICD-10-CM

## 2018-10-10 PROCEDURE — 97110 THERAPEUTIC EXERCISES: CPT

## 2018-10-10 NOTE — THERAPY TREATMENT NOTE
Outpatient Physical Therapy Ortho Treatment Note  [facility]     Patient Name: Denisa Michelle  : 1960  MRN: [id number]  Today's Date: 10/10/2018      Visit Date: 10/10/2018  Visits . Recert 10/24. MD augustin/jalil RODRIGUES. % improvement.   Visit Dx:    ICD-10-CM ICD-9-CM   1. Generalized muscle weakness M62.81 728.87       There is no problem list on file for this patient.       Past Medical History:   Diagnosis Date   • Breast cancer (CMS/HCC)    • Diabetes mellitus (CMS/HCC)    • Rhabdomyolysis         Past Surgical History:   Procedure Laterality Date   • BREAST LUMPECTOMY     • CHOLECYSTECTOMY                               PT Assessment/Plan     Row Name 10/10/18 1500          PT Assessment    Functional Limitations Decreased safety during functional activities;Impaired locomotion;Limitation in home management;Limitations in community activities;Limitations in functional capacity and performance;Performance in leisure activities  -     Impairments Balance;Gait;Impaired flexibility;Impaired muscle endurance;Muscle strength;Posture;Pain  -     Assessment Comments Good miko of today's ther ex with increased intensity. No complaints during treatment.   -     Rehab Potential Good  -     Patient/caregiver participated in establishment of treatment plan and goals Yes  -     Patient would benefit from skilled therapy intervention Yes  -JW        PT Plan    PT Frequency 2x/week  -     Predicted Duration of Therapy Intervention (Therapy Eval) 4-6 weeks  -     PT Plan Comments Cpnt PT per POC.  -       User Key  (r) = Recorded By, (t) = Taken By, (c) = Cosigned By    Initials Name Provider Type    Dav Peterson PTA Physical Therapy Assistant                Modalities     Row Name 10/10/18 1400             Subjective Pain    Post-Treatment Pain Level 6  -        User Key  (r) = Recorded By, (t) = Taken By, (c) = Cosigned By    Initials Name Provider Type    Dav Peterson,  "PTA Physical Therapy Assistant                Exercises     Row Name 10/10/18 1400             Subjective Comments    Subjective Comments Pt c/o pain at R LB, thighs, and shins.   -JW         Subjective Pain    Able to rate subjective pain? yes  -JW      Pre-Treatment Pain Level 6  -JW      Post-Treatment Pain Level 6  -JW         Aquatics    Aquatics performed? No  -JW         Exercise 1    Exercise Name 1 PRO2  -JW      Time 1 5'  -JW         Exercise 2    Exercise Name 2 SLR  -JW      Sets 2 2  -JW      Reps 2 10  -JW         Exercise 3    Exercise Name 3 H/L hip add squeeze  -JW      Reps 3 20  -JW      Time 3 2-3\"  -JW         Exercise 4    Exercise Name 4 Bridges  -JW      Sets 4 2  -JW      Reps 4 10  -JW         Exercise 5    Exercise Name 5 Clamshells  -JW      Reps 5 20  -JW         Exercise 6    Exercise Name 6 Sit-stands  -JW      Sets 6 2  -JW      Reps 6 10  -JW      Additional Comments airex on chair  -JW         Exercise 7    Exercise Name 7 ST hip abd, ext  -JW      Sets 7 2  -JW      Reps 7 10x ea  -JW         Exercise 8    Exercise Name 8 CR  -JW      Reps 8 25  -JW         Exercise 9    Exercise Name 9 Airex: balance with EC  -JW      Sets 9 2  -JW      Time 9 30\"  -JW        User Key  (r) = Recorded By, (t) = Taken By, (c) = Cosigned By    Initials Name Provider Type    JW Dav Yuen, RAMONA Physical Therapy Assistant                               PT OP Goals     Row Name 10/10/18 1511 10/10/18 1500       PT Short Term Goals    STG Date to Achieve  -- 10/24/18  -JW    STG 1  -- Independent/compliant with HEP  -JW    STG 1 Progress  -- Ongoing  -JW    STG 2  -- Tolerate 45 minute treatment session without increased pain  -JW    STG 2 Progress  -- Ongoing  -JW    STG 3  -- Improve DGI score to 20/24 to demonstrate decreased fall risk and improved safety with functional mobility  -JW    STG 3 Progress  -- Ongoing  -JW    STG 4  -- Demonstrate bilateral hip flex MMT to 4+/5  -JW    STG 4 Progress " " -- Ongoing  -    STG 5  -- Demonstrate airex FT/EC static stance for 30\" or greater  -    STG 5 Progress  -- Ongoing  -       Long Term Goals    LTG Date to Achieve  -- 11/07/18  -    LTG 1  -- Subjectively report 60% improvement or greater  -    LTG 1 Progress  -- Ongoing  -    LTG 2  -- Improve DGI score to 22/24 to demonstrate decreased fall risk and improved safety with functional mobility  -    LT 2 Progress  -- Ongoing  -    LTG 3  -- Perform Tandem R/L leading on airex for 25\" or greater  -    LT 3 Progress  -- Ongoing  -    LTG 4  -- Demonstrate bilateral hip abd MMT to 4+5 or greater  -[facility] 4 Progress  -- Ongoing  -    LTG 5  -- Ascend/descend 5 steps x 5, reciprocal pattern, min to no UE handrail assist  -    LT 5 Progress  -- Ongoing  -       Time Calculation    PT Goal Re-Cert Due Date 10/24/18  -  --      User Key  (r) = Recorded By, (t) = Taken By, (c) = Cosigned By    Initials Name Provider Type     Dav Yuen PTA Physical Therapy Assistant                         Time Calculation:   Start Time: 1428  Stop Time: 1508  Time Calculation (min): 40 min  Total Timed Code Minutes- PT: 40 minute(s)  Therapy Suggested Charges     Code   Minutes Charges    None           Therapy Charges for Today     Code Description Service Date Service Provider Modifiers Qty    72751743927 HC PT THER PROC EA 15 MIN 10/10/2018 Dav Yuen PTA GP 3                    Dav Yuen PTA  10/10/2018     "

## 2018-10-11 ENCOUNTER — APPOINTMENT (OUTPATIENT)
Dept: PHYSICAL THERAPY | Facility: HOSPITAL | Age: 58
End: 2018-10-11

## 2018-10-16 ENCOUNTER — APPOINTMENT (OUTPATIENT)
Dept: PHYSICAL THERAPY | Facility: HOSPITAL | Age: 58
End: 2018-10-16

## 2018-10-19 ENCOUNTER — HOSPITAL ENCOUNTER (OUTPATIENT)
Dept: PHYSICAL THERAPY | Facility: HOSPITAL | Age: 58
Setting detail: THERAPIES SERIES
Discharge: HOME | End: 2018-10-19

## 2018-10-19 DIAGNOSIS — M62.81 GENERALIZED MUSCLE WEAKNESS: Primary | ICD-10-CM

## 2018-10-19 PROCEDURE — 97110 THERAPEUTIC EXERCISES: CPT

## 2018-10-19 NOTE — PROGRESS NOTES
Outpatient Physical Therapy Ortho Treatment Note  [facility]     Patient Name: Denisa Michelle  : 1960  MRN: [id number]  Today's Date: 10/19/2018      Visit Date: 10/19/2018    Subjective Improvement:    None yet   Attendance: 3/3  Approved:   Per Medicare        MD follow up:    LILIA DEMARCO date:     10-24-18    Visit Dx:    ICD-10-CM ICD-9-CM   1. Generalized muscle weakness M62.81 728.87       There is no problem list on file for this patient.       Past Medical History:   Diagnosis Date   • Breast cancer (CMS/HCC)    • Diabetes mellitus (CMS/[facility])    • Rhabdomyolysis         Past Surgical History:   Procedure Laterality Date   • BREAST LUMPECTOMY     • CHOLECYSTECTOMY               PT Ortho     Row Name 10/19/18 0700       Subjective Comments    Subjective Comments Pt reports having some back pain today.    -TM       Precautions and Contraindications    Precautions/Limitations fall precautions  -TM       Subjective Pain    Able to rate subjective pain? yes  -TM    Pre-Treatment Pain Level 5  -TM    Post-Treatment Pain Level 7  -TM      User Key  (r) = Recorded By, (t) = Taken By, (c) = Cosigned By    Initials Name Provider Type    TM Xiao Maddox PTA Physical Therapy Assistant                            PT Assessment/Plan     Row Name 10/19/18 0800          PT Assessment    Assessment Comments Pt tolerated therex progression well.  Did report min increase in back pain.  Able to perform sit to stand without cushion in chair.    -TM     Patient/caregiver participated in establishment of treatment plan and goals Yes  -TM        PT Plan    PT Frequency 2x/week  -TM     Predicted Duration of Therapy Intervention (Therapy Eval) 4-6 weeks  -TM     PT Plan Comments Airex tandem stance for goal attainment  -TM       User Key  (r) = Recorded By, (t) = Taken By, (c) = Cosigned By    Initials Name Provider Type    Xiao Mahoney PTA Physical Therapy Assistant                "     Exercises     Row Name 10/19/18 0700             Subjective Comments    Subjective Comments Pt reports having some back pain today.    -TM         Subjective Pain    Able to rate subjective pain? yes  -TM      Pre-Treatment Pain Level 5  -TM      Post-Treatment Pain Level 7  -TM         Exercise 1    Exercise Name 1 PRO II for ROM/endurance  -TM      Time 1 6 min  -TM      Additional Comments L 2  -TM         Exercise 2    Exercise Name 2 SLR  -TM      Sets 2 2  -TM      Reps 2 10  -TM         Exercise 3    Exercise Name 3 H/L hip add squeeze  -TM      Reps 3 20  -TM         Exercise 4    Exercise Name 4 Bridges  -TM      Sets 4 2  -TM      Reps 4 10  -TM         Exercise 5    Exercise Name 5 T Band hip abd  -TM      Sets 5 2  -TM      Reps 5 10  -TM      Additional Comments red  -TM         Exercise 6    Exercise Name 6 Airex CR/TR  -TM      Reps 6 20  -TM         Exercise 7    Exercise Name 7 Airex mini squats  -TM      Sets 7 2  -TM      Reps 7 10  -TM         Exercise 8    Exercise Name 8 Airex stance FT/EC  -TM      Reps 8 2  -TM      Time 8 30\"  -TM         Exercise 9    Exercise Name 9 Airex step ups fwd  -TM      Reps 9 20  -TM         Exercise 10    Exercise Name 10 standing 3 way hip  -TM      Sets 10 2  -TM      Reps 10 10  -TM         Exercise 11    Exercise Name 11 sidestepping in hallway  -TM      Reps 11 1 lap  -TM         Exercise 12    Exercise Name 12 retro gait  -TM      Reps 12 1 lap  -TM         Exercise 13    Exercise Name 13 high knee march  -TM      Reps 13 1 lap  -TM         Exercise 14    Exercise Name 14 sit <-> stand from chair  -TM      Sets 14 2  -TM      Reps 14 10  -TM      Additional Comments no HHA  -TM         Exercise 15    Exercise Name 15 T Band HS pulls  -TM      Sets 15 2  -TM      Reps 15 10  -TM      Additional Comments red  -TM         Exercise 16    Exercise Name 16 Airex beam tandem gait  -TM      Reps 16 4 laps  -TM      Additional Comments intermittant HHA  -TM   " "     User Key  (r) = Recorded By, (t) = Taken By, (c) = Cosigned By    Initials Name Provider Type     Xiao Maddox PTA Physical Therapy Assistant                               PT OP Goals     Row Name 10/19/18 0800          PT Short Term Goals    STG Date to Achieve 10/24/18  -TM     STG 1 Independent/compliant with HEP  -TM     STG 1 Progress Ongoing  -TM     STG 2 Tolerate 45 minute treatment session without increased pain  -TM     STG 2 Progress Ongoing  -TM     STG 3 Improve DGI score to 20/24 to demonstrate decreased fall risk and improved safety with functional mobility  -TM     STG 3 Progress Ongoing  -TM     STG 4 Demonstrate bilateral hip flex MMT to 4+/5  -TM     STG 4 Progress Ongoing  -     STG 5 Demonstrate airex FT/EC static stance for 30\" or greater  -TM     STG 5 Progress Met  -        Long Term Goals    LTG Date to Achieve 11/07/18  -TM     LTG 1 Subjectively report 60% improvement or greater  -     LTG 1 Progress Ongoing  -     LTG 2 Improve DGI score to 22/24 to demonstrate decreased fall risk and improved safety with functional mobility  -     LTG 2 Progress Ongoing  -     LTG 3 Perform Tandem R/L leading on airex for 25\" or greater  -TM     LTG 3 Progress Ongoing  -     LTG 4 Demonstrate bilateral hip abd MMT to 4+5 or greater  -TM     LTG 4 Progress Ongoing  -     LTG 5 Ascend/descend 5 steps x 5, reciprocal pattern, min to no UE handrail assist  -     LTG 5 Progress Ongoing  -       User Key  (r) = Recorded By, (t) = Taken By, (c) = Cosigned By    Initials Name Provider Type     Xiao Maddox PTA Physical Therapy Assistant                         Time Calculation:   Start Time: 0800  Stop Time: 0845  Time Calculation (min): 45 min  Total Timed Code Minutes- PT: 45 minute(s)  Therapy Suggested Charges     Code   Minutes Charges    None           Therapy Charges for Today     Code Description Service Date Service Provider Modifiers Qty    93808413017  PT " THER PROC EA 15 MIN 10/19/2018 Xiao Maddox, PTA GP 3                    Xiao Maddox, PTA  10/19/2018

## 2018-10-22 ENCOUNTER — HOSPITAL ENCOUNTER (OUTPATIENT)
Dept: PHYSICAL THERAPY | Facility: HOSPITAL | Age: 58
Setting detail: THERAPIES SERIES
Discharge: HOME | End: 2018-10-22

## 2018-10-22 DIAGNOSIS — M62.81 GENERALIZED MUSCLE WEAKNESS: Primary | ICD-10-CM

## 2018-10-22 PROCEDURE — G8978 MOBILITY CURRENT STATUS: HCPCS | Performed by: PHYSICAL THERAPIST

## 2018-10-22 PROCEDURE — 97110 THERAPEUTIC EXERCISES: CPT | Performed by: PHYSICAL THERAPIST

## 2018-10-22 PROCEDURE — G8979 MOBILITY GOAL STATUS: HCPCS | Performed by: PHYSICAL THERAPIST

## 2018-10-22 NOTE — THERAPY PROGRESS REPORT/RE-CERT
Outpatient Physical Therapy Ortho Progress Note  [facility]     Patient Name: Denisa Michelle  : 1960  MRN: [id number]  Today's Date: 10/22/2018      Visit Date: 10/22/2018     Subjective Improvement:   60%    Attendance:   Approved:   Medicare guidelines        MD follow up:    LILIA DEMARCO date:     18      There is no problem list on file for this patient.       Past Medical History:   Diagnosis Date   • Breast cancer (CMS/HCC)    • Diabetes mellitus (CMS/[facility])    • Rhabdomyolysis         Past Surgical History:   Procedure Laterality Date   • BREAST LUMPECTOMY     • CHOLECYSTECTOMY         Visit Dx:     ICD-10-CM ICD-9-CM   1. Generalized muscle weakness M62.81 728.87                 PT Ortho     Row Name 10/22/18 1400       Precautions and Contraindications    Precautions/Limitations fall precautions  -KG       Subjective Pain    Pre-Treatment Pain Level 6  -KG       Posture/Observations    Posture/Observations Comments No acute distress. Ambulates with no AD, non-antalgic gait. Rounded shoulders posture.  -KG       General ROM    GENERAL ROM COMMENTS Bilateral hip & knee AROM WNL without increased pain  -KG       MMT Right Lower Ext    Rt Hip Flexion MMT, Gross Movement (4+/5) good plus  -KG    Rt Hip ABduction MMT, Gross Movement (4-/5) good minus  -KG    Rt Hip ADduction MMT, Gross Movement (4+/5) good plus  -KG    Rt Knee Extension MMT, Gross Movement (4+/5) good plus  -KG    Rt Knee Flexion MMT, Gross Movement (4+/5) good plus  -KG       MMT Left Lower Ext    Lt Hip Flexion MMT, Gross Movement (4+/5) good plus  -KG    Lt Hip ABduction MMT, Gross Movement (4/5) good  -KG    Lt Hip ADduction MMT, Gross Movement (4+/5) good plus  -KG    Lt Knee Extension MMT, Gross Movement (4+/5) good plus  -KG    Lt Knee Flexion MMT, Gross Movement (4+/5) good plus  -KG       Sensation    Sensation WNL? WNL  -KG    Light Touch No apparent deficits  -KG       Balance Skills Training     "Balance Comments Airex FT/EC 30\", tandem stance R & L leading 30\" with increased sway but no LOB.  -KG      User Key  (r) = Recorded By, (t) = Taken By, (c) = Cosigned By    Initials Name Provider Type    Christiana Mccracken, PT Physical Therapist                      Therapy Education  Education Details: Added bridges and HL hip abd; continue current HEP  Given: HEP, Symptoms/condition management, Posture/body mechanics, Fall prevention and home safety, Mobility training  Program: Progressed  How Provided: Verbal, Demonstration, Written  Provided to: Patient  Level of Understanding: Teach back education performed, Verbalized, Demonstrated           PT OP Goals     Row Name 10/22/18 1400          PT Short Term Goals    STG Date to Achieve 10/29/18  -KG     STG 1 Independent/compliant with HEP  -KG     STG 1 Progress Met;Ongoing  -KG     STG 2 Tolerate 45 minute treatment session without increased pain  -KG     STG 2 Progress Partially Met;Ongoing  -KG     STG 3 Improve DGI score to 20/24 to demonstrate decreased fall risk and improved safety with functional mobility  -KG     STG 3 Progress Met  -KG     STG 4 Demonstrate bilateral hip flex MMT to 4+/5  -KG     STG 4 Progress Ongoing  -KG     STG 5 Demonstrate airex FT/EC static stance for 30\" or greater  -KG     STG 5 Progress Met  -KG        Long Term Goals    LTG Date to Achieve 11/12/18  -KG     LTG 1 Subjectively report 60% improvement or greater  -KG     LTG 1 Progress Met  -KG     LTG 2 Improve DGI score to 22/24 to demonstrate decreased fall risk and improved safety with functional mobility  -KG     LTG 2 Progress Ongoing  -KG     LTG 3 Perform Tandem R/L leading on airex for 25\" or greater  -KG     LTG 3 Progress Met  -KG     LTG 4 Demonstrate bilateral hip abd MMT to 4+5 or greater  -KG     LTG 4 Progress Ongoing  -KG     LTG 5 Ascend/descend 5 steps x 5, reciprocal pattern, min to no UE handrail assist  -KG     LTG 5 Progress Partially Met  -KG        Time " Calculation    PT Goal Re-Cert Due Date 11/12/18  -KG       User Key  (r) = Recorded By, (t) = Taken By, (c) = Cosigned By    Initials Name Provider Type    KG Christiana Schwarz, PT Physical Therapist                PT Assessment/Plan     Row Name 10/22/18 1400          PT Assessment    Functional Limitations Decreased safety during functional activities;Impaired locomotion;Limitation in home management;Limitations in community activities;Limitations in functional capacity and performance;Performance in leisure activities  -KG     Impairments Balance;Gait;Impaired flexibility;Impaired muscle endurance;Muscle strength;Posture;Pain  -KG     Assessment Comments Pt progressing well with PT at this time. Demonstrates good improvement in Dynamic gait index score, demonstrating improved safety with functional mobility & decreasing fall risk. Good improvements noted in overall BLE strength but continues to fatigue with increase standing activities. Overall did well with dynamic balance activities with only intermittent HHA required throughout; somewhat unsteady at times with nela negotiation and airex beam activities but no LOB. Pt reports that she's almost ready to be finished with PT. Pt will continue to benefit from skilled PT however for further strengthening and improvement in dynamic balance.  -KG     Rehab Potential Good  -KG     Patient/caregiver participated in establishment of treatment plan and goals Yes  -KG     Patient would benefit from skilled therapy intervention Yes  -KG        PT Plan    PT Frequency 2x/week  -KG     Predicted Duration of Therapy Intervention (Therapy Eval) 2-3 more weeks  -KG     PT Plan Comments Pt reports that she's almost ready to be done with PT. Discussed with pt that we will continue to progress functional strengthening and balance for 2-3 more weeks. If pt feels that she is ready to stop PT prior to that, we will discharge at that time.  -KG       User Key  (r) = Recorded By, (t)  "= Taken By, (c) = Cosigned By    Initials Name Provider Type    KG Christiana Schwarz JENNA, PT Physical Therapist                  Exercises     Row Name 10/22/18 1400             Precautions    Existing Precautions/Restrictions no known precautions/restrictions  -KG         Subjective Comments    Subjective Comments Pt states her thighs were sore after the last treatment. States overall therapy seems to be helping some but doesn't know how much it's going to help since they don't why she's falling. States she's almost ready to be done with PT.  -KG         Subjective Pain    Able to rate subjective pain? yes  -KG      Pre-Treatment Pain Level 6  -KG      Post-Treatment Pain Level 6  -KG      Subjective Pain Comment \"my thighs are sore\"  -KG         Aquatics    Aquatics performed? No  -KG         Exercise 1    Exercise Name 1 PRO II for ROM/endurance  -KG      Time 1 8 min  -KG      Additional Comments L 4.0  -KG         Exercise 2    Exercise Name 2 Airex CR/TR  -KG      Cueing 2 Verbal  -KG      Sets 2 2  -KG      Reps 2 10  -KG         Exercise 3    Exercise Name 3 Airex mini squats  -KG      Cueing 3 Verbal  -KG      Sets 3 2  -KG      Reps 3 10  -KG         Exercise 4    Exercise Name 4 Airex slow march  -KG      Cueing 4 Verbal  -KG      Sets 4 2  -KG      Reps 4 10  -KG         Exercise 5    Exercise Name 5 Standing hip abd  -KG      Cueing 5 Verbal  -KG      Sets 5 2  -KG      Reps 5 10  -KG         Exercise 6    Exercise Name 6 Standing hip ext  -KG      Cueing 6 Verbal  -KG      Sets 6 2  -KG      Reps 6 10  -KG         Exercise 7    Exercise Name 7 Airex FT/EO  -KG      Reps 7 2  -KG      Time 7 30\" hold  -KG         Exercise 8    Exercise Name 8 Airex FT/EC  -KG      Reps 8 2  -KG      Time 8 30\" hold  -KG         Exercise 9    Exercise Name 9 Tandem R lead  -KG      Cueing 9 Verbal  -KG      Reps 9 2  -KG      Time 9 30\" hold  -KG         Exercise 10    Exercise Name 10 Tandem stance L lead  -KG      Cueing " "10 Verbal  -KG      Reps 10 2  -KG      Time 10 30\" hold  -KG         Exercise 11    Exercise Name 11 Airex beam lat sidestepping at handrail  -KG      Cueing 11 Verbal  -KG      Sets 11 1  -KG      Reps 11 5 laps  -KG      Additional Comments Intermittent HHA  -KG         Exercise 12    Exercise Name 12 Airex beam tandem gait at handrail  -KG      Cueing 12 Verbal  -KG      Reps 12 1x 5 laps  -KG      Additional Comments Intermittent HHA  -KG         Exercise 13    Exercise Name 13 Dynamic Gait Index completion  -KG      Time 13 10 minutes  -KG         Exercise 14    Exercise Name 14 Fwd step over small hurdles at handrail  -KG      Cueing 14 Verbal  -KG      Sets 14 1  -KG      Reps 14 4 laps  -KG      Additional Comments Intermittent HHA  -KG         Exercise 15    Exercise Name 15 Lat step over small hurdles at handrail  -KG      Sets 15 1  -KG      Reps 15 4 laps  -KG      Additional Comments Intermittent HHA  -KG        User Key  (r) = Recorded By, (t) = Taken By, (c) = Cosigned By    Initials Name Provider Type    Christiana Mccracken, PT Physical Therapist                        Outcome Measure Options: Dynamic Gait Index  Dynamic Gait Index (DGI)  Gait Level Surface: Normal: walks 20’, no assistive devices, good speed, no evidence for imbalance, normal gait pattern  Change in Gait Speed: Normal: Able to smoothly change walking speed without loss of balance or gait deviation. Shows significant difference in walking speeds between normal, fast and slow paces.  Gait with Horizontal Head Turns: Mild impairment: Performs head turns smoothly with slight change in gait velocity, i.e. minor disruption to smooth gait path or uses walking aid.  Gait with Vertical Head Turns: Mild impairment: Performs head turns smoothly with slight change in gait velocity, i.e. minor disruption to smooth gait path or uses walking aid.  Gait and Pivot Turn: Normal: Pivot turns safely within 3 seconds and stops quickly with no loss of " balance.  Step Over Obstacle: Mild impairment: Is able to step over shoe box, but must slow down and adjust steps to clear box safely.  Step Around Obstacles: Normal: Is able to walk safely around cones safely without changing gait speed, no evidence of imbalance.  Steps: Mild impairment: Alternating feet, must use rail.  Dynamic Gait Index Score: 20  Lower Extremity Functional Index  Any of your usual work, housework or school activities: Moderate difficulty  Your usual hobbies, recreational or sporting activities: Moderate difficulty  Getting into or out of the bath: A little bit of difficulty  Walking between rooms: No difficulty  Putting on your shoes or socks: A little bit of difficulty  Squatting: Quite a bit of difficulty  Lifting an object, like a bag of groceries from the floor: A little bit of difficulty  Performing light activities around your home: A little bit of difficulty  Performing heavy activities around your home: A little bit of difficulty  Getting into or out of a car: A little bit of difficulty  Walking 2 blocks: Moderate difficulty  Walking a mile: Quite a bit of difficulty  Going up or down 10 stairs (about 1 flight of stairs): Quite a bit of difficulty  Standing for 1 hour: Extreme difficulty or unable to perform activity  Sitting for 1 hour: Extreme difficulty or unable to perform activity  Running on even ground: Extreme difficulty or unable to perform activity  Running on uneven ground: Extreme difficulty or unable to perform activity  Making sharp turns while running fast: Extreme difficulty or unable to perform activity  Hopping: Extreme difficulty or unable to perform activity  Rolling over in bed: A little bit of difficulty  Total: 34      Time Calculation:     Therapy Suggested Charges     Code   Minutes Charges    None             Start Time: 1350  Stop Time: 1435  Time Calculation (min): 45 min  Total Timed Code Minutes- PT: 45 minute(s)     Therapy Charges for Today     Code  Description Service Date Service Provider Modifiers Qty    68044943542 HC PT MOBILITY CURRENT 10/22/2018 Christiana Schwarz, PT GP, CK 1    36865370400 HC PT MOBILITY PROJECTED 10/22/2018 Christiana Schwarz, PT GP, CJ 1    [id number] HC PT THER PROC EA 15 MIN 10/22/2018 Christiana Schwarz, PT GP 3          PT G-Codes  PT Professional Judgement Used?: Yes  Outcome Measure Options: Dynamic Gait Index  Total: 34  Functional Limitation: Mobility: Walking and moving around  Mobility: Walking and Moving Around Current Status (): At least 40 percent but less than 60 percent impaired, limited or restricted  Mobility: Walking and Moving Around Goal Status (): At least 20 percent but less than 40 percent impaired, limited or restricted         Christiana Schwarz, PT  10/22/2018

## 2018-10-29 ENCOUNTER — HOSPITAL ENCOUNTER (OUTPATIENT)
Dept: PHYSICAL THERAPY | Facility: HOSPITAL | Age: 58
Setting detail: THERAPIES SERIES
Discharge: HOME | End: 2018-10-29

## 2018-10-29 DIAGNOSIS — M62.81 GENERALIZED MUSCLE WEAKNESS: Primary | ICD-10-CM

## 2018-10-29 PROCEDURE — 97110 THERAPEUTIC EXERCISES: CPT

## 2018-10-29 NOTE — THERAPY TREATMENT NOTE
Outpatient Physical Therapy Ortho Treatment Note  [facility]  Kayli Gerard, PTA  10/29/18  3:56 PM       Patient Name: Denisa Michelle  : 1960  MRN: [id number]  Today's Date: 10/29/2018      Visit Date: 10/29/2018    Subjective Improvement: 60%       Attendance:    Approved: medicare guidelines           MD follow up: TBD          RC date: 18        Visit Dx:    ICD-10-CM ICD-9-CM   1. Generalized muscle weakness M62.81 728.87       There is no problem list on file for this patient.       Past Medical History:   Diagnosis Date   • Breast cancer (CMS/HCC)    • Diabetes mellitus (CMS/HCC)    • Rhabdomyolysis         Past Surgical History:   Procedure Laterality Date   • BREAST LUMPECTOMY     • CHOLECYSTECTOMY                               PT Assessment/Plan     Row Name 10/29/18 1500          PT Assessment    Functional Limitations Decreased safety during functional activities;Impaired locomotion;Limitation in home management;Limitations in community activities;Limitations in functional capacity and performance;Performance in leisure activities  -KM     Impairments Balance;Gait;Impaired flexibility;Impaired muscle endurance;Muscle strength;Posture;Pain  -KM     Assessment Comments pt requested to leave treatment early today- so only 30 min treatment given. pt did well with balance activities. Was more challenged with tandem stance than FT stance.   -KM     Rehab Potential Good  -KM     Patient/caregiver participated in establishment of treatment plan and goals Yes  -KM     Patient would benefit from skilled therapy intervention Yes  -KM        PT Plan    PT Frequency 2x/week  -KM     Predicted Duration of Therapy Intervention (Therapy Eval) 2-3 more weeks  -KM     PT Plan Comments FT w/ head truns or possible pertubations. D/C next week to I management per pt request  -KM       User Key  (r) = Recorded By, (t) = Taken By, (c) = Cosigned By    Initials Name Provider  "Type    Kayli Bee PTA Physical Therapy Assistant                    Exercises     Row Name 10/29/18 1500             Precautions    Existing Precautions/Restrictions no known precautions/restrictions  -KM         Subjective Comments    Subjective Comments pt states that she needs to leave in about 30 min- \"I need to get home and walk my dog\"  -KM         Subjective Pain    Able to rate subjective pain? yes  -KM      Pre-Treatment Pain Level 0  -KM      Post-Treatment Pain Level 0  -KM         Aquatics    Aquatics performed? No  -KM         Exercise 1    Exercise Name 1 PRO II for ROM/endurance  -KM      Time 1 10 min  -KM      Additional Comments L4.0  -KM         Exercise 2    Exercise Name 2 Airex CR/TR  -KM      Sets 2 2  -KM      Reps 2 10  -KM         Exercise 3    Exercise Name 3 Airex mini squats  -KM      Sets 3 2  -KM      Reps 3 10  -KM         Exercise 4    Exercise Name 4 Airex slow march  -KM      Sets 4 2  -KM      Reps 4 10  -KM         Exercise 5    Exercise Name 5 Standing hip abd  -KM      Sets 5 2  -KM      Reps 5 10  -KM         Exercise 6    Exercise Name 6 Standing hip ext  -KM      Sets 6 2  -KM      Reps 6 10  -KM         Exercise 7    Exercise Name 7 Airex FT/EC  -KM      Reps 7 2  -KM      Time 7 30 sec hold  -KM         Exercise 8    Exercise Name 8 Airex tandem R lead  -KM      Reps 8 30 sec  -KM         Exercise 9    Exercise Name 9 Airex tandem L lead  -KM      Reps 9 30 sec  -KM         Exercise 10    Exercise Name 10 fwd step up and over hurdles  -KM      Reps 10 4 laps  -KM         Exercise 11    Exercise Name 11 lat step up and over small hurdles  -KM      Reps 11 4 laps  -KM        User Key  (r) = Recorded By, (t) = Taken By, (c) = Cosigned By    Initials Name Provider Type    Kayli Bee PTA Physical Therapy Assistant                               PT OP Goals     Row Name 10/29/18 1500          PT Short Term Goals    STG Date to Achieve 10/29/18  " "-KM     STG 1 Independent/compliant with HEP  -KM     STG 1 Progress Met;Ongoing  -KM     STG 2 Tolerate 45 minute treatment session without increased pain  -KM     STG 2 Progress Partially Met;Ongoing  -KM     STG 3 Improve DGI score to 20/24 to demonstrate decreased fall risk and improved safety with functional mobility  -KM     STG 3 Progress Met  -KM     STG 4 Demonstrate bilateral hip flex MMT to 4+/5  -KM     STG 4 Progress Ongoing  -KM     STG 5 Demonstrate airex FT/EC static stance for 30\" or greater  -KM     STG 5 Progress Met  -KM        Long Term Goals    LTG Date to Achieve 11/12/18  -KM     LTG 1 Subjectively report 60% improvement or greater  -KM     LTG 1 Progress Met  -KM     LTG 2 Improve DGI score to 22/24 to demonstrate decreased fall risk and improved safety with functional mobility  -KM     LTG 2 Progress Ongoing  -KM     LTG 3 Perform Tandem R/L leading on airex for 25\" or greater  -KM     LTG 3 Progress Met  -KM     LTG 4 Demonstrate bilateral hip abd MMT to 4+5 or greater  -KM     LTG 4 Progress Ongoing  -KM     LTG 5 Ascend/descend 5 steps x 5, reciprocal pattern, min to no UE handrail assist  -KM     LTG 5 Progress Partially Met  -KM        Time Calculation    PT Goal Re-Cert Due Date 11/12/18  -KM       User Key  (r) = Recorded By, (t) = Taken By, (c) = Cosigned By    Initials Name Provider Type    Kayli Bee, RAMONA Physical Therapy Assistant          Therapy Education  Given: HEP, Symptoms/condition management, Posture/body mechanics, Fall prevention and home safety, Mobility training  Program: Progressed  How Provided: Verbal, Demonstration, Written  Provided to: Patient  Level of Understanding: Teach back education performed, Verbalized, Demonstrated              Time Calculation:   Start Time: 1315  Stop Time: 1346  Time Calculation (min): 31 min  Total Timed Code Minutes- PT: 31 minute(s)  Therapy Suggested Charges     Code   Minutes Charges    None           Therapy " Charges for Today     Code Description Service Date Service Provider Modifiers Qty    28947919066 HC PT THER PROC EA 15 MIN 10/29/2018 Kayli Gerard, PTA GP 2                    Kayli Gerard, PTA  10/29/2018

## 2018-11-01 ENCOUNTER — HOSPITAL ENCOUNTER (OUTPATIENT)
Dept: PHYSICAL THERAPY | Facility: HOSPITAL | Age: 58
Setting detail: THERAPIES SERIES
Discharge: HOME | End: 2018-11-01

## 2018-11-01 DIAGNOSIS — M62.81 GENERALIZED MUSCLE WEAKNESS: Primary | ICD-10-CM

## 2018-11-01 PROCEDURE — 97110 THERAPEUTIC EXERCISES: CPT

## 2018-11-01 NOTE — THERAPY TREATMENT NOTE
Outpatient Physical Therapy Ortho Treatment Note  [facility]     Patient Name: Denisa Michelle  : 1960  MRN: [id number]  Today's Date: 2018      Visit Date: 2018  Subjective Improvement: 60%       Attendance:    Approved: medicare guidelines           MD follow up: LILIA DEMARCO date: 18      Visit Dx:    ICD-10-CM ICD-9-CM   1. Generalized muscle weakness M62.81 728.87       There is no problem list on file for this patient.       Past Medical History:   Diagnosis Date   • Breast cancer (CMS/HCC)    • Diabetes mellitus (CMS/[facility])    • Rhabdomyolysis         Past Surgical History:   Procedure Laterality Date   • BREAST LUMPECTOMY     • CHOLECYSTECTOMY               PT Ortho     Row Name 18 1300       Subjective Comments    Subjective Comments Pt states she is doing ok; no problems with HEP.  -PM       Precautions and Contraindications    Precautions/Limitations fall precautions  -PM       Subjective Pain    Able to rate subjective pain? yes  -PM    Pre-Treatment Pain Level 0  -PM    Post-Treatment Pain Level 0  -PM      User Key  (r) = Recorded By, (t) = Taken By, (c) = Cosigned By    Initials Name Provider Type    PM Anna Means PTA Physical Therapy Assistant                            PT Assessment/Plan     Row Name 18 1300          PT Assessment    Assessment Comments Pt did well with static and dynamic balance ex; unsteady with EC on AirEx but maintained balance. Did well with step over hurdles no HHA but CGA.  -PM     Rehab Potential Good  -PM     Patient/caregiver participated in establishment of treatment plan and goals Yes  -PM     Patient would benefit from skilled therapy intervention Yes  -PM        PT Plan    PT Frequency 2x/week  -PM     Predicted Duration of Therapy Intervention (Therapy Eval) 2-3 more weeks  -PM     PT Plan Comments FF step up and recip stair with handrail; initiate fitness equipment; possible DC end of next  "wk if pt still good and wishes so.  -PM       User Key  (r) = Recorded By, (t) = Taken By, (c) = Cosigned By    Initials Name Provider Type    PM Anna Means PTA Physical Therapy Assistant                    Exercises     Row Name 11/01/18 1300             Precautions    Existing Precautions/Restrictions no known precautions/restrictions  -PM         Subjective Comments    Subjective Comments Pt states she is doing ok; no problems with HEP.  -PM         Subjective Pain    Able to rate subjective pain? yes  -PM      Pre-Treatment Pain Level 0  -PM      Post-Treatment Pain Level 0  -PM         Aquatics    Aquatics performed? No  -PM         Exercise 1    Exercise Name 1 PRO II for ROM/endurance  -PM      Time 1 10 min  -PM      Additional Comments L4  -PM         Exercise 2    Exercise Name 2 Airex CR/TR  -PM      Sets 2 2  -PM      Reps 2 10  -PM         Exercise 3    Exercise Name 3 Airex mini squats  -PM      Sets 3 2  -PM      Reps 3 10  -PM         Exercise 4    Exercise Name 4 Airex slow march  -PM      Sets 4 2  -PM      Reps 4 10  -PM         Exercise 5    Exercise Name 5 Standing hip abd  -PM      Cueing 5 Verbal  -PM      Sets 5 2  -PM      Reps 5 10  -PM         Exercise 6    Exercise Name 6 Standing hip ext  -PM      Cueing 6 Verbal  -PM      Sets 6 2  -PM      Reps 6 10  -PM         Exercise 7    Exercise Name 7 Airex FT/EC  -PM      Reps 7 2  -PM      Time 7 30 sec hold  -PM         Exercise 8    Exercise Name 8 Airex tandem R lead  -PM      Reps 8 30 sec  -PM         Exercise 9    Exercise Name 9 Airex tandem L lead  -PM      Reps 9 2  -PM      Time 9 30\"  -PM         Exercise 10    Exercise Name 10 fwd step over hurdles  -PM      Reps 10 4 laps  -PM      Additional Comments @ handrail, no HHA, CGA  -PM         Exercise 11    Exercise Name 11 lat step over small hurdles  -PM      Reps 11 4 laps  -PM      Additional Comments @ handrail, no HHA; CGA  -PM         Exercise 12    Exercise Name 12 " "AIREX FT EO  -PM      Time 12 1'  -PM         Exercise 13    Exercise Name 13 AirEx beam lat step  -PM      Cueing 13 Verbal  -PM      Reps 13 4  -PM         Exercise 14    Exercise Name 14 review bridge  -PM      Cueing 14 Verbal  -PM      Sets 14 2  -PM      Reps 14 10  -PM         Exercise 15    Exercise Name 15 HL clam w/TB  -PM      Cueing 15 Verbal  -PM      Sets 15 2  -PM      Reps 15 10  -PM      Additional Comments green TB  -PM        User Key  (r) = Recorded By, (t) = Taken By, (c) = Cosigned By    Initials Name Provider Type    PM Anna Means, PTA Physical Therapy Assistant                               PT OP Goals     Row Name 11/01/18 1300          PT Short Term Goals    STG Date to Achieve 10/29/18  -PM     STG 1 Independent/compliant with HEP  -PM     STG 1 Progress Met;Ongoing  -PM     STG 2 Tolerate 45 minute treatment session without increased pain  -PM     STG 2 Progress Met  -PM     STG 3 Improve DGI score to 20/24 to demonstrate decreased fall risk and improved safety with functional mobility  -PM     STG 3 Progress Met  -PM     STG 4 Demonstrate bilateral hip flex MMT to 4+/5  -PM     STG 4 Progress Progressing  -PM     STG 5 Demonstrate airex FT/EC static stance for 30\" or greater  -PM     STG 5 Progress Met  -PM        Long Term Goals    LTG Date to Achieve 11/12/18  -PM     LTG 1 Subjectively report 60% improvement or greater  -PM     LTG 1 Progress Met  -PM     LTG 2 Improve DGI score to 22/24 to demonstrate decreased fall risk and improved safety with functional mobility  -PM     LTG 2 Progress Progressing  -PM     LTG 3 Perform Tandem R/L leading on airex for 25\" or greater  -PM     LTG 3 Progress Met  -PM     LTG 4 Demonstrate bilateral hip abd MMT to 4+5 or greater  -PM     LTG 4 Progress Progressing  -PM     LTG 5 Ascend/descend 5 steps x 5, reciprocal pattern, min to no UE handrail assist  -PM     LTG 5 Progress Partially Met  -PM        Time Calculation    PT Goal Re-Cert " Due Date 11/12/18  -PM       User Key  (r) = Recorded By, (t) = Taken By, (c) = Cosigned By    Initials Name Provider Type    PM Anna Means PTA Physical Therapy Assistant          Therapy Education  Given: HEP, Symptoms/condition management, Posture/body mechanics, Fall prevention and home safety, Mobility training  Program: Reinforced  How Provided: Verbal, Demonstration, Written  Provided to: Patient  Level of Understanding: Teach back education performed, Verbalized, Demonstrated              Time Calculation:   Start Time: 1300  Stop Time: 1349  Time Calculation (min): 49 min  Total Timed Code Minutes- PT: 49 minute(s)  Therapy Suggested Charges     Code   Minutes Charges    None           Therapy Charges for Today     Code Description Service Date Service Provider Modifiers Qty    39624870056 HC PT THER PROC EA 15 MIN 11/1/2018 Anna Means PTA GP 3                    Anna Means PTA  11/1/2018

## 2018-11-06 ENCOUNTER — HOSPITAL ENCOUNTER (OUTPATIENT)
Dept: PHYSICAL THERAPY | Facility: HOSPITAL | Age: 58
Setting detail: THERAPIES SERIES
Discharge: HOME | End: 2018-11-06

## 2018-11-06 DIAGNOSIS — M62.81 GENERALIZED MUSCLE WEAKNESS: Primary | ICD-10-CM

## 2018-11-06 PROCEDURE — 97110 THERAPEUTIC EXERCISES: CPT

## 2018-11-06 NOTE — THERAPY TREATMENT NOTE
Outpatient Physical Therapy Ortho Treatment Note  [facility]     Patient Name: Denisa Michelle  : 1960  MRN: [id number]  Today's Date: 2018      Visit Date: 2018  Subjective Improvement: 60-70%       Attendance:    Approved: medicare guidelines           MD follow up: LILIA DEMARCO date: 18      Visit Dx:    ICD-10-CM ICD-9-CM   1. Generalized muscle weakness M62.81 728.87       There is no problem list on file for this patient.       Past Medical History:   Diagnosis Date   • Breast cancer (CMS/HCC)    • Diabetes mellitus (CMS/[facility])    • Rhabdomyolysis         Past Surgical History:   Procedure Laterality Date   • BREAST LUMPECTOMY     • CHOLECYSTECTOMY               PT Ortho     Row Name 18 1000       Precautions and Contraindications    Precautions/Limitations fall precautions  -PM       Subjective Pain    Able to rate subjective pain? yes  -PM      User Key  (r) = Recorded By, (t) = Taken By, (c) = Cosigned By    Initials Name Provider Type    Anna Mirza PTA Physical Therapy Assistant                            PT Assessment/Plan     Row Name 18 1000          PT Assessment    Assessment Comments Improved bal with less support. Did well with Cybex and recip stairs.  -PM     Rehab Potential Good  -PM     Patient/caregiver participated in establishment of treatment plan and goals Yes  -PM     Patient would benefit from skilled therapy intervention Yes  -PM        PT Plan    PT Frequency 2x/week  -PM     Predicted Duration of Therapy Intervention (Therapy Eval) 2-3 more wks  -PM     PT Plan Comments DC NV per pt request; fitness papers; show how to get up from floor  -PM       User Key  (r) = Recorded By, (t) = Taken By, (c) = Cosigned By    Initials Name Provider Type    Anna Mirza PTA Physical Therapy Assistant                    Exercises     Row Name 18 1000             Precautions    Existing  "Precautions/Restrictions no known precautions/restrictions  -PM         Subjective Pain    Able to rate subjective pain? yes  -PM      Pre-Treatment Pain Level 0  -PM      Post-Treatment Pain Level 0  -PM         Aquatics    Aquatics performed? No  -PM         Exercise 1    Exercise Name 1 PRO II for ROM/endurance  -PM      Time 1 10 min  -PM      Additional Comments L5  -PM         Exercise 2    Exercise Name 2 Airex CR/TR  -PM      Reps 2 20  -PM         Exercise 3    Exercise Name 3 Airex mini squats  -PM      Reps 3 20  -PM         Exercise 4    Exercise Name 4 Airex slow march  -PM      Reps 4 25  -PM         Exercise 5    Exercise Name 5 Cybex LP2  -PM      Cueing 5 Verbal  -PM      Sets 5 2  -PM      Reps 5 10  -PM      Additional Comments 70#  -PM         Exercise 6    Exercise Name 6 Cybex ham curl  -PM      Cueing 6 Verbal  -PM      Sets 6 2  -PM      Reps 6 10  -PM      Additional Comments 30#  -PM         Exercise 7    Exercise Name 7 Cybex Hip abd/add  -PM      Cueing 7 Verbal  -PM      Sets 7 2  -PM      Reps 7 10  -PM      Time 7 --  -PM      Additional Comments 30#  -PM         Exercise 8    Exercise Name 8 AirEx beam tandem gait  -PM      Cueing 8 Verbal  -PM      Reps 8 4 laps  -PM      Additional Comments SBA; only intermittent lt touches  -PM         Exercise 9    Exercise Name 9 AirEx Beam lat step no HHA  -PM      Cueing 9 Verbal  -PM      Reps 9 4 laps  -PM      Time 9 --  -PM         Exercise 10    Exercise Name 10 fwd step over hurdles  -PM      Reps 10 4 laps  -PM         Exercise 11    Exercise Name 11 lat step over small hurdles  -PM      Reps 11 4 laps  -PM         Exercise 12    Exercise Name 12 SLB w/finger sweep  -PM      Cueing 12 Verbal  -PM      Reps 12 2  -PM      Time 12 20\"  -PM         Exercise 13    Exercise Name 13 --  -PM      Cueing 13 --  -PM      Reps 13 --  -PM         Exercise 14    Cueing 14 --  -PM      Sets 14 --  -PM      Reps 14 --  -PM         Exercise 15    " "Cueing 15 --  -PM      Sets 15 --  -PM      Reps 15 --  -PM        User Key  (r) = Recorded By, (t) = Taken By, (c) = Cosigned By    Initials Name Provider Type    PM Anna Means PTA Physical Therapy Assistant                               PT OP Goals     Row Name 11/06/18 1000          PT Short Term Goals    STG Date to Achieve 10/29/18  -PM     STG 1 Independent/compliant with HEP  -PM     STG 1 Progress Met;Ongoing  -PM     STG 2 Tolerate 45 minute treatment session without increased pain  -PM     STG 2 Progress Met  -PM     STG 3 Improve DGI score to 20/24 to demonstrate decreased fall risk and improved safety with functional mobility  -PM     STG 3 Progress Met  -PM     STG 4 Demonstrate bilateral hip flex MMT to 4+/5  -PM     STG 4 Progress Progressing  -PM     STG 5 Demonstrate airex FT/EC static stance for 30\" or greater  -PM     STG 5 Progress Met  -PM        Long Term Goals    LTG Date to Achieve 11/12/18  -PM     LTG 1 Subjectively report 60% improvement or greater  -PM     LTG 1 Progress Met  -PM     LTG 2 Improve DGI score to 22/24 to demonstrate decreased fall risk and improved safety with functional mobility  -PM     LTG 2 Progress Progressing  -PM     LTG 3 Perform Tandem R/L leading on airex for 25\" or greater  -PM     LTG 3 Progress Met  -PM     LTG 4 Demonstrate bilateral hip abd MMT to 4+5 or greater  -PM     LTG 4 Progress Progressing  -PM     LTG 5 Ascend/descend 5 steps x 5, reciprocal pattern, min to no UE handrail assist  -PM     LTG 5 Progress Partially Met  -PM        Time Calculation    PT Goal Re-Cert Due Date 11/12/18  -PM       User Key  (r) = Recorded By, (t) = Taken By, (c) = Cosigned By    Initials Name Provider Type    PM Anna Means PTA Physical Therapy Assistant          Therapy Education  Given: HEP, Symptoms/condition management, Posture/body mechanics, Fall prevention and home safety, Mobility training  Program: Reinforced  How Provided: Verbal, " Demonstration, Written  Provided to: Patient  Level of Understanding: Teach back education performed, Verbalized, Demonstrated              Time Calculation:   Start Time: 1016  Stop Time: 1100  Time Calculation (min): 44 min  Total Timed Code Minutes- PT: 44 minute(s)  Therapy Suggested Charges     Code   Minutes Charges    None           Therapy Charges for Today     Code Description Service Date Service Provider Modifiers Qty    51496028601 HC PT THER PROC EA 15 MIN 11/6/2018 Anna Means, RAMONA GP 3                    Anna Means PTA  11/6/2018

## 2018-11-08 ENCOUNTER — HOSPITAL ENCOUNTER (OUTPATIENT)
Dept: PHYSICAL THERAPY | Facility: HOSPITAL | Age: 58
Setting detail: THERAPIES SERIES
Discharge: HOME | End: 2018-11-08

## 2018-11-08 DIAGNOSIS — M62.81 GENERALIZED MUSCLE WEAKNESS: Primary | ICD-10-CM

## 2018-11-08 PROCEDURE — 97110 THERAPEUTIC EXERCISES: CPT

## 2018-12-14 ENCOUNTER — OFFICE VISIT (OUTPATIENT)
Dept: NEUROLOGY | Age: 58
End: 2018-12-14
Payer: MEDICARE

## 2018-12-14 VITALS
BODY MASS INDEX: 28.51 KG/M2 | WEIGHT: 167 LBS | HEART RATE: 73 BPM | DIASTOLIC BLOOD PRESSURE: 67 MMHG | SYSTOLIC BLOOD PRESSURE: 104 MMHG | HEIGHT: 64 IN

## 2018-12-14 DIAGNOSIS — R27.0 ATAXIA: ICD-10-CM

## 2018-12-14 DIAGNOSIS — G20 PARKINSONISM, UNSPECIFIED PARKINSONISM TYPE (HCC): Primary | ICD-10-CM
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
## 2018-12-14 PROCEDURE — G8427 DOCREV CUR MEDS BY ELIG CLIN: HCPCS | Performed by: PSYCHIATRY & NEUROLOGY

## 2018-12-14 PROCEDURE — G8417 CALC BMI ABV UP PARAM F/U: HCPCS | Performed by: PSYCHIATRY & NEUROLOGY

## 2018-12-14 PROCEDURE — 3017F COLORECTAL CA SCREEN DOC REV: CPT | Performed by: PSYCHIATRY & NEUROLOGY

## 2018-12-14 PROCEDURE — 99214 OFFICE O/P EST MOD 30 MIN: CPT | Performed by: PSYCHIATRY & NEUROLOGY

## 2018-12-14 PROCEDURE — G8484 FLU IMMUNIZE NO ADMIN: HCPCS | Performed by: PSYCHIATRY & NEUROLOGY

## 2018-12-14 PROCEDURE — 4004F PT TOBACCO SCREEN RCVD TLK: CPT | Performed by: PSYCHIATRY & NEUROLOGY

## 2018-12-14 RX ORDER — LANOLIN ALCOHOL/MO/W.PET/CERES
40 CREAM (GRAM) TOPICAL DAILY
COMMUNITY

## 2018-12-14 RX ORDER — CHOLECALCIFEROL (VITAMIN D3) 1250 MCG
CAPSULE ORAL
COMMUNITY

## 2018-12-14 NOTE — PROGRESS NOTES
delayed release capsule Take 20 mg by mouth 2 times daily      pregabalin (LYRICA) 150 MG capsule Take 150 mg by mouth nightly. Jose Rough aspirin 81 MG tablet Take 81 mg by mouth daily      ALPRAZolam (XANAX) 0.5 MG tablet Take 0.5 mg by mouth 2 times daily. Takes 1 tablet twice daily and 2 pills at night.  DULoxetine (CYMBALTA) 30 MG extended release capsule Take 30 mg by mouth daily Takes 2 pills AM and 1 pill at night      buPROPion (WELLBUTRIN SR) 150 MG extended release tablet Take 150 mg by mouth daily Takes 2 pills AM and 1 pill at noon      levothyroxine (SYNTHROID) 50 MCG tablet Take 50 mcg by mouth Daily       No current facility-administered medications for this visit. Outpatient Prescriptions Marked as Taking for the 12/14/18 encounter (Office Visit) with Chelsie Arriola MD   Medication Sig Dispense Refill    Cholecalciferol (VITAMIN D3) 42323 units CAPS Take by mouth      lurasidone (LATUDA) 80 MG TABS tablet Take 80 mg by mouth daily      melatonin 3 MG TABS tablet Take 40 mg by mouth daily      atorvastatin (LIPITOR) 40 MG tablet Take 40 mg by mouth daily      busPIRone (BUSPAR) 30 MG tablet Take 30 mg by mouth daily Takes 1 1/5 pills twice daily      metFORMIN (GLUCOPHAGE) 500 MG tablet Take 500 mg by mouth 2 times daily (with meals)      omeprazole (PRILOSEC) 20 MG delayed release capsule Take 20 mg by mouth 2 times daily      pregabalin (LYRICA) 150 MG capsule Take 150 mg by mouth nightly. Jose Rough aspirin 81 MG tablet Take 81 mg by mouth daily      ALPRAZolam (XANAX) 0.5 MG tablet Take 0.5 mg by mouth 2 times daily. Takes 1 tablet twice daily and 2 pills at night.       DULoxetine (CYMBALTA) 30 MG extended release capsule Take 30 mg by mouth daily Takes 2 pills AM and 1 pill at night      buPROPion (WELLBUTRIN SR) 150 MG extended release tablet Take 150 mg by mouth daily Takes 2 pills AM and 1 pill at noon      levothyroxine (SYNTHROID) 50 MCG tablet Take 50 mcg by mouth Daily MCV 95.9 08/19/2014     08/19/2014     Lab Results   Component Value Date     12/01/2017    K 3.9 12/01/2017     12/01/2017    CO2 26 12/01/2017    BUN 10 12/01/2017    CREATININE 0.9 12/01/2017    GLUCOSE 81 12/01/2017    CALCIUM 9.5 12/01/2017    PROT 6.6 12/01/2017    LABALBU 4.2 12/01/2017    BILITOT 0.7 12/01/2017    ALKPHOS 83 12/01/2017    AST 17 12/01/2017    ALT 39 (H) 12/01/2017    LABGLOM >60 12/01/2017           Assessment    ICD-10-CM    1. Parkinsonism, unspecified Parkinsonism type ([facility] [address] G20    2. Ataxia R27.0    3. Fall, subsequent encounter W19. XXXD      This patient has definite parkinsonism. Could be drug-induced but she has not been on a neuroleptic for the past 2 or 3 years. I suspect that this is contributing to some of her falls. She may have PSP but does not have any of the typical eye findings on today's examination. I have recommended reducing her nighttime dose of Lyrica down to 75 mg   I would be happy see her back again in the future should she desire it. Otherwise remain available as needed. Plan    Return if symptoms worsen or fail to improve.     (Please note that portions of this note were completed with a voice recognition program. Efforts were made to edit the dictations but occasionally words are mis-transcribed.)

## 2020-11-26 DIAGNOSIS — I49.5 SINUS NODE DYSFUNCTION (HCC): Primary | ICD-10-CM

## 2020-11-26 RX ORDER — SODIUM CHLORIDE 0.9 % (FLUSH) 0.9 %
3 SYRINGE (ML) INJECTION EVERY 12 HOURS SCHEDULED
Status: CANCELLED | OUTPATIENT
Start: 2020-11-26

## 2020-11-26 RX ORDER — SODIUM CHLORIDE 0.9 % (FLUSH) 0.9 %
3-10 SYRINGE (ML) INJECTION AS NEEDED
Status: CANCELLED | OUTPATIENT
Start: 2020-11-26

## 2020-12-02 ENCOUNTER — TELEPHONE (OUTPATIENT)
Dept: CARDIOLOGY | Facility: CLINIC | Age: 60
End: 2020-12-02

## 2020-12-02 NOTE — TELEPHONE ENCOUNTER
Ranjith Olivier MD Wallace, Angelica ENAMORADO MA             I will be placing an order for pacemaker  Make sure she has had a recent TSH in the last 3 to 6 months if not needs to get this done before she had the pacemaker placed  This can be done in [facility]  Let me know the results of the TSH      DR OLIVIER -     I REVIEWED CHART & [place] RECORDS - NO TSH IS AVAILABLE. I PLACED A CALL TO [place] TO VERIFY THIS.    EVE IS GOING TO PLACE AN ORDER FOR THIS & NOTIFY PT.  SHE STATES THAT THE LAB WILL FAX RESULTS WHEN AVAILABLE.      ETHAN- CAN YOU LET ME KNOW WHEN YOU RECEIVE LAB RESULTS FROM [place] PLEASE ?

## 2020-12-08 ENCOUNTER — TELEPHONE (OUTPATIENT)
Dept: CARDIOLOGY | Facility: CLINIC | Age: 60
End: 2020-12-08

## 2020-12-08 NOTE — TELEPHONE ENCOUNTER
PLACED A CALL TO PT REGARDING TSH. PT STATES THAT SHE IS GOING TO GO TO THE [facility] TODAY TO GET IT DONE. I ASKED THAT SHE REMIND THEM TO FAX IT TO OUR [place] OFFICE ONCE RESULTED.

## 2020-12-16 ENCOUNTER — TRANSCRIBE ORDERS (OUTPATIENT)
Dept: LAB | Facility: HOSPITAL | Age: 60
End: 2020-12-16

## 2020-12-16 DIAGNOSIS — Z01.818 PREOP TESTING: Primary | ICD-10-CM

## 2020-12-16 PROBLEM — I49.5 SINUS NODE DYSFUNCTION: Status: ACTIVE | Noted: 2020-12-16

## 2020-12-17 ENCOUNTER — LAB (OUTPATIENT)
Dept: LAB | Facility: HOSPITAL | Age: 60
End: 2020-12-17

## 2020-12-17 LAB — SARS-COV-2 RNA PNL SPEC NAA+PROBE: NOT DETECTED

## 2020-12-17 PROCEDURE — 87635 SARS-COV-2 COVID-19 AMP PRB: CPT | Performed by: INTERNAL MEDICINE

## 2020-12-17 PROCEDURE — C9803 HOPD COVID-19 SPEC COLLECT: HCPCS | Performed by: INTERNAL MEDICINE

## 2020-12-18 ENCOUNTER — HOSPITAL ENCOUNTER (OUTPATIENT)
Facility: HOSPITAL | Age: 60
Discharge: HOME | End: 2020-12-19
Attending: INTERNAL MEDICINE | Admitting: INTERNAL MEDICINE

## 2020-12-18 ENCOUNTER — APPOINTMENT (OUTPATIENT)
Dept: GENERAL RADIOLOGY | Facility: HOSPITAL | Age: 60
End: 2020-12-18

## 2020-12-18 DIAGNOSIS — I49.5 SINUS NODE DYSFUNCTION (HCC): ICD-10-CM

## 2020-12-18 LAB
ALBUMIN SERPL-MCNC: 4 G/DL (ref 3.5–5.2)
ALBUMIN/GLOB SERPL: 1.9 G/DL
ALP SERPL-CCNC: 85 U/L (ref 39–117)
ALT SERPL W P-5'-P-CCNC: 15 U/L (ref 1–33)
ANION GAP SERPL CALCULATED.3IONS-SCNC: 4 MMOL/L (ref 5–15)
ANION GAP SERPL CALCULATED.3IONS-SCNC: 7 MMOL/L (ref 5–15)
AST SERPL-CCNC: 12 U/L (ref 1–32)
BASOPHILS # BLD AUTO: 0.02 10*3/MM3 (ref 0–0.2)
BASOPHILS NFR BLD AUTO: 0.3 % (ref 0–1.5)
BILIRUB SERPL-MCNC: 0.8 MG/DL (ref 0–1.2)
BUN SERPL-MCNC: 11 MG/DL (ref 8–23)
BUN SERPL-MCNC: 9 MG/DL (ref 8–23)
BUN/CREAT SERPL: 11.8 (ref 7–25)
BUN/CREAT SERPL: 14.7 (ref 7–25)
CALCIUM SPEC-SCNC: 8.7 MG/DL (ref 8.6–10.5)
CALCIUM SPEC-SCNC: 8.8 MG/DL (ref 8.6–10.5)
CHLORIDE SERPL-SCNC: 105 MMOL/L (ref 98–107)
CHLORIDE SERPL-SCNC: 106 MMOL/L (ref 98–107)
CO2 SERPL-SCNC: 26 MMOL/L (ref 22–29)
CO2 SERPL-SCNC: 27 MMOL/L (ref 22–29)
CREAT SERPL-MCNC: 0.75 MG/DL (ref 0.57–1)
CREAT SERPL-MCNC: 0.76 MG/DL (ref 0.57–1)
DEPRECATED RDW RBC AUTO: 46.9 FL (ref 37–54)
DEPRECATED RDW RBC AUTO: 47.7 FL (ref 37–54)
EOSINOPHIL # BLD AUTO: 0.04 10*3/MM3 (ref 0–0.4)
EOSINOPHIL NFR BLD AUTO: 0.7 % (ref 0.3–6.2)
ERYTHROCYTE [DISTWIDTH] IN BLOOD BY AUTOMATED COUNT: 14.1 % (ref 12.3–15.4)
ERYTHROCYTE [DISTWIDTH] IN BLOOD BY AUTOMATED COUNT: 14.2 % (ref 12.3–15.4)
GFR SERPL CREATININE-BSD FRML MDRD: 77 ML/MIN/1.73
GFR SERPL CREATININE-BSD FRML MDRD: 78 ML/MIN/1.73
GLOBULIN UR ELPH-MCNC: 2.1 GM/DL
GLUCOSE SERPL-MCNC: 92 MG/DL (ref 65–99)
GLUCOSE SERPL-MCNC: 92 MG/DL (ref 65–99)
HCT VFR BLD AUTO: 39.2 % (ref 34–46.6)
HCT VFR BLD AUTO: 39.7 % (ref 34–46.6)
HGB BLD-MCNC: 13.2 G/DL (ref 12–15.9)
HGB BLD-MCNC: 13.4 G/DL (ref 12–15.9)
IMM GRANULOCYTES # BLD AUTO: 0.02 10*3/MM3 (ref 0–0.05)
IMM GRANULOCYTES NFR BLD AUTO: 0.3 % (ref 0–0.5)
INR PPP: 0.94 (ref 0.91–1.09)
INR PPP: 0.99 (ref 0.91–1.09)
LYMPHOCYTES # BLD AUTO: 1.05 10*3/MM3 (ref 0.7–3.1)
LYMPHOCYTES NFR BLD AUTO: 17.7 % (ref 19.6–45.3)
MCH RBC QN AUTO: 30.7 PG (ref 26.6–33)
MCH RBC QN AUTO: 31 PG (ref 26.6–33)
MCHC RBC AUTO-ENTMCNC: 33.7 G/DL (ref 31.5–35.7)
MCHC RBC AUTO-ENTMCNC: 33.8 G/DL (ref 31.5–35.7)
MCV RBC AUTO: 90.8 FL (ref 79–97)
MCV RBC AUTO: 92 FL (ref 79–97)
MONOCYTES # BLD AUTO: 0.53 10*3/MM3 (ref 0.1–0.9)
MONOCYTES NFR BLD AUTO: 9 % (ref 5–12)
NEUTROPHILS NFR BLD AUTO: 4.26 10*3/MM3 (ref 1.7–7)
NEUTROPHILS NFR BLD AUTO: 72 % (ref 42.7–76)
NRBC BLD AUTO-RTO: 0 /100 WBC (ref 0–0.2)
PLATELET # BLD AUTO: 206 10*3/MM3 (ref 140–450)
PLATELET # BLD AUTO: 212 10*3/MM3 (ref 140–450)
PMV BLD AUTO: 9.7 FL (ref 6–12)
PMV BLD AUTO: 9.8 FL (ref 6–12)
POTASSIUM SERPL-SCNC: 3.9 MMOL/L (ref 3.5–5.2)
POTASSIUM SERPL-SCNC: 4 MMOL/L (ref 3.5–5.2)
PROT SERPL-MCNC: 6.1 G/DL (ref 6–8.5)
PROTHROMBIN TIME: 12.2 SECONDS (ref 11.9–14.6)
PROTHROMBIN TIME: 12.7 SECONDS (ref 11.9–14.6)
RBC # BLD AUTO: 4.26 10*6/MM3 (ref 3.77–5.28)
RBC # BLD AUTO: 4.37 10*6/MM3 (ref 3.77–5.28)
SODIUM SERPL-SCNC: 136 MMOL/L (ref 136–145)
SODIUM SERPL-SCNC: 139 MMOL/L (ref 136–145)
WBC # BLD AUTO: 4.31 10*3/MM3 (ref 3.4–10.8)
WBC # BLD AUTO: 5.92 10*3/MM3 (ref 3.4–10.8)

## 2020-12-18 PROCEDURE — A9270 NON-COVERED ITEM OR SERVICE: HCPCS | Performed by: INTERNAL MEDICINE

## 2020-12-18 PROCEDURE — 0 IOPAMIDOL PER 1 ML: Performed by: INTERNAL MEDICINE

## 2020-12-18 PROCEDURE — 99152 MOD SED SAME PHYS/QHP 5/>YRS: CPT | Performed by: INTERNAL MEDICINE

## 2020-12-18 PROCEDURE — 71045 X-RAY EXAM CHEST 1 VIEW: CPT

## 2020-12-18 PROCEDURE — C1785 PMKR, DUAL, RATE-RESP: HCPCS | Performed by: INTERNAL MEDICINE

## 2020-12-18 PROCEDURE — 63710000001 BUSPIRONE 10 MG TABLET: Performed by: INTERNAL MEDICINE

## 2020-12-18 PROCEDURE — 85610 PROTHROMBIN TIME: CPT | Performed by: INTERNAL MEDICINE

## 2020-12-18 PROCEDURE — 99153 MOD SED SAME PHYS/QHP EA: CPT | Performed by: INTERNAL MEDICINE

## 2020-12-18 PROCEDURE — 33208 INSRT HEART PM ATRIAL & VENT: CPT | Performed by: INTERNAL MEDICINE

## 2020-12-18 PROCEDURE — 63710000001 ATORVASTATIN 40 MG TABLET: Performed by: INTERNAL MEDICINE

## 2020-12-18 PROCEDURE — 63710000001 MELATONIN 3 MG TABLET: Performed by: INTERNAL MEDICINE

## 2020-12-18 PROCEDURE — 85027 COMPLETE CBC AUTOMATED: CPT | Performed by: INTERNAL MEDICINE

## 2020-12-18 PROCEDURE — C1898 LEAD, PMKR, OTHER THAN TRANS: HCPCS | Performed by: INTERNAL MEDICINE

## 2020-12-18 PROCEDURE — 80053 COMPREHEN METABOLIC PANEL: CPT | Performed by: INTERNAL MEDICINE

## 2020-12-18 PROCEDURE — 25010000002 MIDAZOLAM HCL (PF) 5 MG/5ML SOLUTION: Performed by: INTERNAL MEDICINE

## 2020-12-18 PROCEDURE — C1892 INTRO/SHEATH,FIXED,PEEL-AWAY: HCPCS | Performed by: INTERNAL MEDICINE

## 2020-12-18 PROCEDURE — 25010000002 CEFAZOLIN PER 500 MG: Performed by: INTERNAL MEDICINE

## 2020-12-18 PROCEDURE — 25010000002 FENTANYL CITRATE (PF) 100 MCG/2ML SOLUTION: Performed by: INTERNAL MEDICINE

## 2020-12-18 PROCEDURE — 85025 COMPLETE CBC W/AUTO DIFF WBC: CPT | Performed by: INTERNAL MEDICINE

## 2020-12-18 PROCEDURE — S0260 H&P FOR SURGERY: HCPCS | Performed by: INTERNAL MEDICINE

## 2020-12-18 PROCEDURE — 63710000001 PREGABALIN 75 MG CAPSULE: Performed by: INTERNAL MEDICINE

## 2020-12-18 PROCEDURE — 63710000001 ALPRAZOLAM 0.5 MG TABLET: Performed by: INTERNAL MEDICINE

## 2020-12-18 DEVICE — PACEMAKER
Type: IMPLANTABLE DEVICE | Status: FUNCTIONAL
Brand: ESSENTIO™ MRI EL DR

## 2020-12-18 DEVICE — PACE/SENSE LEAD
Type: IMPLANTABLE DEVICE | Status: FUNCTIONAL
Brand: INGEVITY™+

## 2020-12-18 RX ORDER — MIDAZOLAM HYDROCHLORIDE 1 MG/ML
INJECTION, SOLUTION INTRAMUSCULAR; INTRAVENOUS AS NEEDED
Status: DISCONTINUED | OUTPATIENT
Start: 2020-12-18 | End: 2020-12-18 | Stop reason: HOSPADM

## 2020-12-18 RX ORDER — ATORVASTATIN CALCIUM 40 MG/1
40 TABLET, FILM COATED ORAL NIGHTLY
Status: DISCONTINUED | OUTPATIENT
Start: 2020-12-18 | End: 2020-12-19 | Stop reason: HOSPADM

## 2020-12-18 RX ORDER — ALPRAZOLAM 1 MG/1
1 TABLET ORAL 2 TIMES DAILY
COMMUNITY

## 2020-12-18 RX ORDER — ATENOLOL 25 MG/1
12.5 TABLET ORAL DAILY
Status: ON HOLD | COMMUNITY
End: 2020-12-18

## 2020-12-18 RX ORDER — ALPRAZOLAM 2 MG/1
2 TABLET ORAL NIGHTLY PRN
COMMUNITY
End: 2022-04-28 | Stop reason: SDUPTHER

## 2020-12-18 RX ORDER — LEVOTHYROXINE SODIUM 0.05 MG/1
50 TABLET ORAL
Status: DISCONTINUED | OUTPATIENT
Start: 2020-12-19 | End: 2020-12-19 | Stop reason: HOSPADM

## 2020-12-18 RX ORDER — SODIUM CHLORIDE 0.9 % (FLUSH) 0.9 %
3-10 SYRINGE (ML) INJECTION AS NEEDED
Status: DISCONTINUED | OUTPATIENT
Start: 2020-12-18 | End: 2020-12-19 | Stop reason: HOSPADM

## 2020-12-18 RX ORDER — ATORVASTATIN CALCIUM 40 MG/1
40 TABLET, FILM COATED ORAL NIGHTLY
COMMUNITY
End: 2022-04-12 | Stop reason: SDUPTHER

## 2020-12-18 RX ORDER — LEVOTHYROXINE SODIUM 0.05 MG/1
50 TABLET ORAL DAILY
COMMUNITY
End: 2022-05-13 | Stop reason: SDUPTHER

## 2020-12-18 RX ORDER — CHOLECALCIFEROL (VITAMIN D3) 125 MCG
40 CAPSULE ORAL NIGHTLY
COMMUNITY

## 2020-12-18 RX ORDER — LIDOCAINE HYDROCHLORIDE 20 MG/ML
INJECTION, SOLUTION INTRAVENOUS AS NEEDED
Status: DISCONTINUED | OUTPATIENT
Start: 2020-12-18 | End: 2020-12-18 | Stop reason: HOSPADM

## 2020-12-18 RX ORDER — SODIUM CHLORIDE 0.9 % (FLUSH) 0.9 %
3 SYRINGE (ML) INJECTION EVERY 12 HOURS SCHEDULED
Status: DISCONTINUED | OUTPATIENT
Start: 2020-12-18 | End: 2020-12-19 | Stop reason: HOSPADM

## 2020-12-18 RX ORDER — PREGABALIN 75 MG/1
150 CAPSULE ORAL EVERY 12 HOURS SCHEDULED
Status: DISCONTINUED | OUTPATIENT
Start: 2020-12-18 | End: 2020-12-19 | Stop reason: HOSPADM

## 2020-12-18 RX ORDER — NITROGLYCERIN 0.4 MG/1
0.4 TABLET SUBLINGUAL
COMMUNITY

## 2020-12-18 RX ORDER — ASPIRIN 81 MG/1
81 TABLET, CHEWABLE ORAL DAILY
Status: ON HOLD | COMMUNITY
End: 2020-12-18

## 2020-12-18 RX ORDER — PREGABALIN 100 MG/1
150 CAPSULE ORAL
COMMUNITY
End: 2022-04-28

## 2020-12-18 RX ORDER — BUSPIRONE HYDROCHLORIDE 15 MG/1
45 TABLET ORAL 2 TIMES DAILY
COMMUNITY
End: 2022-06-08 | Stop reason: DRUGHIGH

## 2020-12-18 RX ORDER — SODIUM CHLORIDE 0.9 % (FLUSH) 0.9 %
3-10 SYRINGE (ML) INJECTION AS NEEDED
Status: DISCONTINUED | OUTPATIENT
Start: 2020-12-18 | End: 2020-12-18 | Stop reason: HOSPADM

## 2020-12-18 RX ORDER — TRAZODONE HYDROCHLORIDE 50 MG/1
100 TABLET ORAL NIGHTLY
COMMUNITY

## 2020-12-18 RX ORDER — LANOLIN ALCOHOL/MO/W.PET/CERES
9 CREAM (GRAM) TOPICAL NIGHTLY
Status: DISCONTINUED | OUTPATIENT
Start: 2020-12-18 | End: 2020-12-19 | Stop reason: HOSPADM

## 2020-12-18 RX ORDER — SODIUM CHLORIDE 0.9 % (FLUSH) 0.9 %
3 SYRINGE (ML) INJECTION EVERY 12 HOURS SCHEDULED
Status: DISCONTINUED | OUTPATIENT
Start: 2020-12-18 | End: 2020-12-18 | Stop reason: HOSPADM

## 2020-12-18 RX ORDER — NITROGLYCERIN 0.4 MG/1
0.4 TABLET SUBLINGUAL
Status: DISCONTINUED | OUTPATIENT
Start: 2020-12-18 | End: 2020-12-19 | Stop reason: HOSPADM

## 2020-12-18 RX ORDER — FENTANYL CITRATE 50 UG/ML
INJECTION, SOLUTION INTRAMUSCULAR; INTRAVENOUS AS NEEDED
Status: DISCONTINUED | OUTPATIENT
Start: 2020-12-18 | End: 2020-12-18 | Stop reason: HOSPADM

## 2020-12-18 RX ORDER — TRAZODONE HYDROCHLORIDE 100 MG/1
100 TABLET ORAL NIGHTLY
Status: DISCONTINUED | OUTPATIENT
Start: 2020-12-18 | End: 2020-12-19 | Stop reason: HOSPADM

## 2020-12-18 RX ORDER — ALPRAZOLAM 0.5 MG/1
1 TABLET ORAL 2 TIMES DAILY
Status: DISCONTINUED | OUTPATIENT
Start: 2020-12-18 | End: 2020-12-19 | Stop reason: HOSPADM

## 2020-12-18 RX ORDER — BUSPIRONE HYDROCHLORIDE 10 MG/1
20 TABLET ORAL EVERY 12 HOURS SCHEDULED
Status: DISCONTINUED | OUTPATIENT
Start: 2020-12-18 | End: 2020-12-19 | Stop reason: HOSPADM

## 2020-12-18 RX ORDER — SODIUM CHLORIDE 9 MG/ML
60 INJECTION, SOLUTION INTRAVENOUS CONTINUOUS
Status: DISCONTINUED | OUTPATIENT
Start: 2020-12-18 | End: 2020-12-19 | Stop reason: HOSPADM

## 2020-12-18 RX ORDER — BUPIVACAINE HCL/0.9 % NACL/PF 0.1 %
2 PLASTIC BAG, INJECTION (ML) EPIDURAL ONCE
Status: COMPLETED | OUTPATIENT
Start: 2020-12-18 | End: 2020-12-18

## 2020-12-18 RX ORDER — ALPRAZOLAM 0.5 MG/1
2 TABLET ORAL NIGHTLY PRN
Status: DISCONTINUED | OUTPATIENT
Start: 2020-12-18 | End: 2020-12-19 | Stop reason: HOSPADM

## 2020-12-18 RX ORDER — ACETAMINOPHEN 325 MG/1
650 TABLET ORAL EVERY 6 HOURS PRN
Status: DISCONTINUED | OUTPATIENT
Start: 2020-12-18 | End: 2020-12-19 | Stop reason: HOSPADM

## 2020-12-18 RX ADMIN — SODIUM CHLORIDE, PRESERVATIVE FREE 3 ML: 5 INJECTION INTRAVENOUS at 21:32

## 2020-12-18 RX ADMIN — ATORVASTATIN CALCIUM 40 MG: 40 TABLET, FILM COATED ORAL at 21:31

## 2020-12-18 RX ADMIN — Medication 2 G: at 17:04

## 2020-12-18 RX ADMIN — BUSPIRONE HYDROCHLORIDE 20 MG: 10 TABLET ORAL at 21:31

## 2020-12-18 RX ADMIN — PREGABALIN 150 MG: 75 CAPSULE ORAL at 21:31

## 2020-12-18 RX ADMIN — Medication 9 MG: at 21:31

## 2020-12-18 RX ADMIN — ALPRAZOLAM 1 MG: 0.5 TABLET ORAL at 21:31

## 2020-12-18 RX ADMIN — SODIUM CHLORIDE 60 ML/HR: 9 INJECTION, SOLUTION INTRAVENOUS at 21:32

## 2020-12-19 VITALS
SYSTOLIC BLOOD PRESSURE: 133 MMHG | HEIGHT: 62 IN | HEART RATE: 62 BPM | WEIGHT: 160.5 LBS | OXYGEN SATURATION: 97 % | BODY MASS INDEX: 29.53 KG/M2 | DIASTOLIC BLOOD PRESSURE: 62 MMHG | TEMPERATURE: 98.2 F | RESPIRATION RATE: 18 BRPM

## 2020-12-19 LAB
QT INTERVAL: 426 MS
QTC INTERVAL: 426 MS

## 2020-12-19 PROCEDURE — 93005 ELECTROCARDIOGRAM TRACING: CPT | Performed by: INTERNAL MEDICINE

## 2020-12-19 PROCEDURE — A9270 NON-COVERED ITEM OR SERVICE: HCPCS | Performed by: INTERNAL MEDICINE

## 2020-12-19 PROCEDURE — 63710000001 PREGABALIN 75 MG CAPSULE: Performed by: INTERNAL MEDICINE

## 2020-12-19 PROCEDURE — 99024 POSTOP FOLLOW-UP VISIT: CPT | Performed by: INTERNAL MEDICINE

## 2020-12-19 PROCEDURE — 63710000001 LEVOTHYROXINE 50 MCG TABLET: Performed by: INTERNAL MEDICINE

## 2020-12-19 PROCEDURE — 63710000001 ALPRAZOLAM 0.5 MG TABLET: Performed by: INTERNAL MEDICINE

## 2020-12-19 PROCEDURE — 63710000001 BUSPIRONE 10 MG TABLET: Performed by: INTERNAL MEDICINE

## 2020-12-19 RX ADMIN — BUSPIRONE HYDROCHLORIDE 20 MG: 10 TABLET ORAL at 08:37

## 2020-12-19 RX ADMIN — ALPRAZOLAM 1 MG: 0.5 TABLET ORAL at 08:37

## 2020-12-19 RX ADMIN — LEVOTHYROXINE SODIUM 50 MCG: 50 TABLET ORAL at 05:13

## 2020-12-19 RX ADMIN — SODIUM CHLORIDE, PRESERVATIVE FREE 3 ML: 5 INJECTION INTRAVENOUS at 08:37

## 2020-12-19 RX ADMIN — PREGABALIN 150 MG: 75 CAPSULE ORAL at 08:37

## 2020-12-19 NOTE — PLAN OF CARE
Goal Outcome Evaluation:  Plan of Care Reviewed With: patient  Progress: improving   Pt was admitted yesterday after placement of her pacemaker. HOB elevated throughout night. L arm remains in sling. IVF currently infusing. Pt c/o pain last night and requested meds. When meds arrived pt refused. VSS. Safety maintained, will cont to monitor.

## 2020-12-19 NOTE — H&P
LOS: 0 days   Patient Care Team:  Bobby Da Silva MD as PCP - General    Chief Complaint:      Slim Michelle is a 60 y.o. female who is being seen in evaluation.  Patient follows up in [facility]  Presented with complaints of feeling dizzy and weak  She had been on low-dose atenolol  This was discontinued  Despite this patient continued to have marked daytime sinus bradycardia with associated symptoms of presyncope  She has a home monitor which shows heart rates between 30 to 40 bpm at times  Patient prior to insertion of pacemaker had a heart rate between 30 to 40 bpm.  Thyroid tests overall unremarkable  Recurrent presyncope  No caroline syncope  Denies any chest pain currently  Has chronic shortness of breath  Gets extremely weak and tired  Cannot walk her dog as feels tired weak short of breath and feels will pass out    Telemetry: no malignant arrhythmia. No significant pauses.    Review of Systems   Constitutional: No chills   Has fatigue   No fever.   HENT: Negative.    Eyes: Negative.    Respiratory: Negative for cough,   No chest wall soreness,   Shortness of breath,   no wheezing, no stridor.    Cardiovascular: As above  Gastrointestinal: Negative for abdominal distention,  No abdominal pain,   No blood in stool,   No constipation,   No diarrhea,   No nausea   No vomiting.   Endocrine: Negative.    Genitourinary: Negative for difficulty urinating, dysuria, flank pain and hematuria.   Musculoskeletal: Negative.    Skin: Negative for rash and wound.   Allergic/Immunologic: Negative.    Neurological: Negative for dizziness, syncope, weakness,   No light-headedness  No  headaches.   Hematological: Does not bruise/bleed easily.   Psychiatric/Behavioral: Negative for agitation or behavioral problems,   No confusion,   the patient is  nervous/anxious.       History:   Past Medical History:   Diagnosis Date   • Breast cancer (CMS/HCC) 2006   • Diabetes mellitus (CMS/[facility])    •  Rhabdomyolysis      Past Surgical History:   Procedure Laterality Date   • BREAST LUMPECTOMY  2006   • CHOLECYSTECTOMY       Social History     Socioeconomic History   • Marital status:      Spouse name: Not on file   • Number of children: Not on file   • Years of education: Not on file   • Highest education level: Not on file     No family history on file.    Labs:  WBC WBC   Date Value Ref Range Status   12/18/2020 4.31 3.40 - 10.80 10*3/mm3 Final   12/18/2020 5.92 3.40 - 10.80 10*3/mm3 Final      HGB Hemoglobin   Date Value Ref Range Status   12/18/2020 13.2 12.0 - 15.9 g/dL Final   12/18/2020 13.4 12.0 - 15.9 g/dL Final      HCT Hematocrit   Date Value Ref Range Status   12/18/2020 39.2 34.0 - 46.6 % Final   12/18/2020 39.7 34.0 - 46.6 % Final      Platelets Platelets   Date Value Ref Range Status   12/18/2020 206 140 - 450 10*3/mm3 Final   12/18/2020 212 140 - 450 10*3/mm3 Final      MCV MCV   Date Value Ref Range Status   12/18/2020 92.0 79.0 - 97.0 fL Final   12/18/2020 90.8 79.0 - 97.0 fL Final        Results from last 7 days   Lab Units 12/18/20  1939 12/18/20  1455   SODIUM mmol/L 136 139   POTASSIUM mmol/L 3.9 4.0   CHLORIDE mmol/L 105 106   CO2 mmol/L 27.0 26.0   BUN mg/dL 9 11   CREATININE mg/dL 0.76 0.75   CALCIUM mg/dL 8.7 8.8   BILIRUBIN mg/dL  --  0.8   ALK PHOS U/L  --  85   ALT (SGPT) U/L  --  15   AST (SGOT) U/L  --  12   GLUCOSE mg/dL 92 92   No results found for: CKTOTAL, CKMB, CKMBINDEX, TROPONINI, TROPONINT  PT/INR:    Protime   Date Value Ref Range Status   12/18/2020 12.7 11.9 - 14.6 Seconds Final   12/18/2020 12.2 11.9 - 14.6 Seconds Final   /  INR   Date Value Ref Range Status   12/18/2020 0.99 0.91 - 1.09 Final   12/18/2020 0.94 0.91 - 1.09 Final       Imaging Results (Last 72 Hours)     Procedure Component Value Units Date/Time    XR Chest 1 View [[id number]] Collected: 12/18/20 1915     Updated: 12/18/20 1919    Narrative:      Frontal upright radiograph of the chest 12/18/2020  7:11 PM CST     HISTORY: Status post pacemaker placement     COMPARISON: Chest exam dated 12/18/2020.     FINDINGS:      New left chest wall dual-chamber pacemaker. No pneumothorax. No lung  consolidation. No pleural effusion. The cardiomediastinal silhouette and  pulmonary vascularity are within normal limits. The osseous structures  and surrounding soft tissues demonstrate no acute abnormality.       Impression:      1. New left chest wall dual-chamber pacemaker. No pneumothorax.        This report was finalized on 12/18/2020 19:16 by Dr Efrem Banuelos, .    XR Chest 1 View [[id number]] Collected: 12/18/20 1533     Updated: 12/18/20 1537    Narrative:      EXAM: XR CHEST 1 VW- - 12/18/2020 3:26 PM CST     HISTORY: Pacemaker insertion; I49.5-Sick sinus syndrome       COMPARISON: 10/29/2014.      TECHNIQUE:  1 images.  Frontal view of the chest.     FINDINGS:    No pneumothorax, pleural effusion or focal consolidation. There appears  to be chronic or recurrent bronchial wall thickening. Prominent cardiac  silhouette. Upper mediastinum within normal limits. Calcified aortic  atherosclerosis. No acute bony finding.          Impression:      1. Chronic or recurrent bronchial wall thickening. No focal  consolidation.  This report was finalized on 12/18/2020 15:34 by Dr Kelly Freeman MD.          Objective     Allergies   Allergen Reactions   • Clindamycin/Lincomycin Hives       Medication Review: Performed  Current Facility-Administered Medications   Medication Dose Route Frequency Provider Last Rate Last Admin   • ALPRAZolam (XANAX) tablet 1 mg  1 mg Oral BID Ranjith Stone MD       • ALPRAZolam (XANAX) tablet 2 mg  2 mg Oral Nightly PRN Ranjith Stone MD       • atorvastatin (LIPITOR) tablet 40 mg  40 mg Oral Nightly Ranjith Stone MD       • busPIRone (BUSPAR) tablet 20 mg  20 mg Oral Q12H Ranjith Stone MD       • [START ON 12/19/2020] levothyroxine (SYNTHROID, LEVOTHROID) tablet 50 mcg  50 mcg Oral Q AM Ranjith Stone MD  "      • melatonin tablet 9 mg  9 mg Oral Nightly Ranjith Stone MD       • nitroglycerin (NITROSTAT) SL tablet 0.4 mg  0.4 mg Sublingual Q5 Min PRN Ranjith Stone MD       • pregabalin (LYRICA) capsule 150 mg  150 mg Oral Q12H Ranjith Stone MD       • sodium chloride 0.9 % flush 3 mL  3 mL Intravenous Q12H Ranjith Stone MD       • sodium chloride 0.9 % flush 3-10 mL  3-10 mL Intravenous PRN Ranjith Stone MD       • sodium chloride 0.9 % infusion  60 mL/hr Intravenous Continuous Ranjith Stone MD       • traZODone (DESYREL) tablet 100 mg  100 mg Oral Nightly Ranjith Stone MD           Vital Sign Min/Max for last 24 hours  Temp  Min: 97.8 °F (36.6 °C)  Max: 97.9 °F (36.6 °C)   BP  Min: 108/57  Max: 149/63   Pulse  Min: 41  Max: 60   Resp  Min: 14  Max: 21   SpO2  Min: 95 %  Max: 100 %   No data recorded   Weight  Min: 72.8 kg (160 lb 8 oz)  Max: 73.6 kg (162 lb 3.2 oz)     Flowsheet Rows      First Filed Value   Admission Height  157.5 cm (62\") Documented at 12/18/2020 1445   Admission Weight  73.6 kg (162 lb 3.2 oz) Documented at 12/18/2020 1445               Physical Exam:    General Appearance: Awake, alert, in no acute distress  Eyes: Pupils equal and reactive    Ears: Appear intact with no abnormalities noted  Nose: Nares normal, no drainage  Neck: supple, trachea midline, no carotid bruit and no JVD  Back: no kyphosis present,    Lungs: respirations regular, respirations even and respirations unlabored  Heart: normal S1, S2, no significant murmurs   No gallops or rubs  no rub and no click  Abdomen: normal bowel sounds, no tenderness   Skin: no bleeding, bruising or rash  Extremities: no cyanosis  Psychiatric/Behavioral: Negative for agitation, behavioral problems, confusion, the patient does  appear to be nervous/anxious.       Results Review:   I reviewed the patient's new clinical results.  I reviewed the patient's new imaging results and agree with the interpretation.  I reviewed the patient's other test results " and agree with the interpretation  I personally viewed and interpreted the patient's EKG/Telemetry data    Discussed with patient  Updated patient regarding any new or relevant abnormalities on review of records or any new findings on physical exam.   Mentioned to patient about purpose of visit and desirable health short and long term goals and objectives.     Reviewed available prior notes, consults, prior visits, laboratory findings, radiology and cardiology relevant reports.   Updated chart as applicable.   I have reviewed the patient's medical history in detail and updated the computerized patient record as relevant.          Assessment/Plan       Sinus node dysfunction (CMS/HCC)  Acquired hypothyroidism  Essential hypertension  History of anxiety    Plan    Consider insertion of dual-chamber AV sequential MRI compatible pacemaker force severe symptomatic irreversible sinus node dysfunction  Risk benefits alternatives explained to patient  She understands and wishes to proceed    Ranjith Stone MD  12/18/20  20:28 CST    EMR Dragon/Transcription was used to dictate part of this note

## 2020-12-19 NOTE — DISCHARGE SUMMARY
LOS: 0 days   Patient Care Team:  Bobby Da Silva MD as PCP - General    Chief Complaint:   Sinus node dysfunction (CMS/HCC)    Shortness of breath    Subjective  Feeling  better  No chest pain   No excessive shortness of breath  No new complaints    Interval History: Improved overall    Denies chest pain currently. Denies excessive shortness of breath. Denies abdominal pain, nausea vomiting or diarrhoea.    Telemetry: no malignant arrhythmia. No significant pauses.  Paced atrial rhythm    Review of Systems   Constitutional: No chills   No fatigue   No fever.   HENT: Negative.    Eyes: Negative.    Respiratory: Negative for cough,   No chest wall soreness,   Mild shortness of breath,   no wheezing, no stridor.    Cardiovascular: Negative for chest pain,   No palpitations   No significant  leg swelling.   Gastrointestinal: Negative for abdominal distention,  No abdominal pain,   No blood in stool, constipation, diarrhea, nausea or vomiting.   Endocrine: Negative.    Genitourinary: Negative for difficulty urinating, dysuria, flank pain and hematuria.   Musculoskeletal: Negative.    Skin: Negative for rash and wound.   Allergic/Immunologic: Negative.    Neurological: Negative for dizziness, syncope, weakness,   No light-headedness or headaches.   Hematological: Does not bruise/bleed easily.   Psychiatric/Behavioral: Negative for agitation, behavioral problems, confusion, the patient does not appear to be nervous/anxious.       History:   Past Medical History:   Diagnosis Date   • Breast cancer (CMS/HCC) 2006   • Diabetes mellitus (CMS/HCC)    • Rhabdomyolysis      Past Surgical History:   Procedure Laterality Date   • BREAST LUMPECTOMY  2006   • CHOLECYSTECTOMY       Social History     Socioeconomic History   • Marital status:      Spouse name: Not on file   • Number of children: Not on file   • Years of education: Not on file   • Highest education level: Not on file   Tobacco Use   • Smoking status:  Current Every Day Smoker     Packs/day: 1.00     Types: Cigarettes     History reviewed. No pertinent family history.    Labs:  WBC WBC   Date Value Ref Range Status   12/18/2020 4.31 3.40 - 10.80 10*3/mm3 Final   12/18/2020 5.92 3.40 - 10.80 10*3/mm3 Final      HGB Hemoglobin   Date Value Ref Range Status   12/18/2020 13.2 12.0 - 15.9 g/dL Final   12/18/2020 13.4 12.0 - 15.9 g/dL Final      HCT Hematocrit   Date Value Ref Range Status   12/18/2020 39.2 34.0 - 46.6 % Final   12/18/2020 39.7 34.0 - 46.6 % Final      Platlets Platelets   Date Value Ref Range Status   12/18/2020 206 140 - 450 10*3/mm3 Final   12/18/2020 212 140 - 450 10*3/mm3 Final      MCV MCV   Date Value Ref Range Status   12/18/2020 92.0 79.0 - 97.0 fL Final   12/18/2020 90.8 79.0 - 97.0 fL Final        Results from last 7 days   Lab Units 12/18/20  1939 12/18/20  1455   SODIUM mmol/L 136 139   POTASSIUM mmol/L 3.9 4.0   CHLORIDE mmol/L 105 106   CO2 mmol/L 27.0 26.0   BUN mg/dL 9 11   CREATININE mg/dL 0.76 0.75   CALCIUM mg/dL 8.7 8.8   BILIRUBIN mg/dL  --  0.8   ALK PHOS U/L  --  85   ALT (SGPT) U/L  --  15   AST (SGOT) U/L  --  12   GLUCOSE mg/dL 92 92   No results found for: CKTOTAL, CKMB, CKMBINDEX, TROPONINI, TROPONINT  PT/INR:    Protime   Date Value Ref Range Status   12/18/2020 12.7 11.9 - 14.6 Seconds Final   12/18/2020 12.2 11.9 - 14.6 Seconds Final   /  INR   Date Value Ref Range Status   12/18/2020 0.99 0.91 - 1.09 Final   12/18/2020 0.94 0.91 - 1.09 Final       Imaging Results (Last 72 Hours)     Procedure Component Value Units Date/Time    XR Chest 1 View [[id number]] Collected: 12/18/20 1915     Updated: 12/18/20 1919    Narrative:      Frontal upright radiograph of the chest 12/18/2020 7:11 PM CST     HISTORY: Status post pacemaker placement     COMPARISON: Chest exam dated 12/18/2020.     FINDINGS:      New left chest wall dual-chamber pacemaker. No pneumothorax. No lung  consolidation. No pleural effusion. The cardiomediastinal  silhouette and  pulmonary vascularity are within normal limits. The osseous structures  and surrounding soft tissues demonstrate no acute abnormality.       Impression:      1. New left chest wall dual-chamber pacemaker. No pneumothorax.        This report was finalized on 12/18/2020 19:16 by Dr Efrem Banuelos, .    XR Chest 1 View [[id number]] Collected: 12/18/20 1533     Updated: 12/18/20 1537    Narrative:      EXAM: XR CHEST 1 VW- - 12/18/2020 3:26 PM CST     HISTORY: Pacemaker insertion; I49.5-Sick sinus syndrome       COMPARISON: 10/29/2014.      TECHNIQUE:  1 images.  Frontal view of the chest.     FINDINGS:    No pneumothorax, pleural effusion or focal consolidation. There appears  to be chronic or recurrent bronchial wall thickening. Prominent cardiac  silhouette. Upper mediastinum within normal limits. Calcified aortic  atherosclerosis. No acute bony finding.          Impression:      1. Chronic or recurrent bronchial wall thickening. No focal  consolidation.  This report was finalized on 12/18/2020 15:34 by Dr Kelly Freeman MD.          Objective     Allergies   Allergen Reactions   • Clindamycin/Lincomycin Hives       Medication Review: Performed  Current Facility-Administered Medications   Medication Dose Route Frequency Provider Last Rate Last Admin   • acetaminophen (TYLENOL) tablet 650 mg  650 mg Oral Q6H PRN Alhaji Savage MD       • ALPRAZolam (XANAX) tablet 1 mg  1 mg Oral BID Ranjith Stone MD   1 mg at 12/18/20 2131   • ALPRAZolam (XANAX) tablet 2 mg  2 mg Oral Nightly PRN Ranjith Stone MD       • atorvastatin (LIPITOR) tablet 40 mg  40 mg Oral Nightly Ranjith Stone MD   40 mg at 12/18/20 2131   • busPIRone (BUSPAR) tablet 20 mg  20 mg Oral Q12H Ranjith Stone MD   20 mg at 12/18/20 2131   • levothyroxine (SYNTHROID, LEVOTHROID) tablet 50 mcg  50 mcg Oral Q AM Ranjith Stone MD   50 mcg at 12/19/20 0513   • melatonin tablet 9 mg  9 mg Oral Nightly Ranjith Stone MD   9 mg at 12/18/20 2131   •  "nitroglycerin (NITROSTAT) SL tablet 0.4 mg  0.4 mg Sublingual Q5 Min PRN Ranjith Stone MD       • pregabalin (LYRICA) capsule 150 mg  150 mg Oral Q12H Ranjith Stone MD   150 mg at 12/18/20 2131   • sodium chloride 0.9 % flush 3 mL  3 mL Intravenous Q12H Ranjith Stone MD   3 mL at 12/18/20 2132   • sodium chloride 0.9 % flush 3-10 mL  3-10 mL Intravenous PRN Ranjith Stone MD       • sodium chloride 0.9 % infusion  60 mL/hr Intravenous Continuous Ranjith Stone MD   Stopped at 12/19/20 0655   • traZODone (DESYREL) tablet 100 mg  100 mg Oral Nightly Ranjith Stone MD           Vital Sign Min/Max for last 24 hours  Temp  Min: 97.8 °F (36.6 °C)  Max: 98 °F (36.7 °C)   BP  Min: 108/57  Max: 149/63   Pulse  Min: 41  Max: 60   Resp  Min: 14  Max: 21   SpO2  Min: 91 %  Max: 100 %   Flow (L/min)  Min: 2  Max: 2   Weight  Min: 72.8 kg (160 lb 8 oz)  Max: 73.6 kg (162 lb 3.2 oz)     Flowsheet Rows      First Filed Value   Admission Height  157.5 cm (62\") Documented at 12/18/2020 1445   Admission Weight  73.6 kg (162 lb 3.2 oz) Documented at 12/18/2020 1445                 Consults:   Consults     No orders found for last 30 day(s).          Procedures Performed  Procedure(s):  Pacemaker DC new       Physical Exam:  General Appearance: Awake, alert, in no acute distress  Eyes: Pupils equal and reactive    Ears: Appear intact with no abnormalities noted  Nose: Nares normal, no drainage  Neck: supple, trachea midline, no carotid bruit and no JVD  Back: no kyphosis present,    Lungs: respirations regular, respirations even and respirations unlabored  Heart: normal S1, S2,  2/6 pansystolic murmur in the left sternal border,  no rub and no click  Abdomen: normal bowel sounds, no masses, no hepatomegaly, no splenomegaly, guarding and no rebound tenderness   Skin: no bleeding, bruising or rash  Extremities: no cyanosis  Psychiatric/Behavioral: Negative for agitation, behavioral problems, confusion, the patient does not appear to be " nervous/anxious.     Pacemaker insertion site looks excellent.  Completely dry.  There is no drainage.  There is no significant swelling tenderness or redness.     Results Review:   I reviewed the patient's new clinical results.  I reviewed the patient's new imaging results and agree with the interpretation.  I reviewed the patient's other test results and agree with the interpretation  I personally viewed and interpreted the patient's EKG/Telemetry data  Discussed with patient,        Assessment/Plan     Discharge diagnosis with prior    Sinus node dysfunction (CMS/HCC)  Insertion of dual-chamber AV sequential MRI compatible pacemaker through left subclavian approach  Tachybradycardia syndrome    Hospital Course  Patient is a 60 y.o. female presented with symptomatic severe irreversible sinus node dysfunction with associated presyncope  Underwent dual-chamber AV sequential MRI compatible pacemaker through the left subclavian approach  Overnight has done well  Has minor soreness at the pacemaker insertion site  Denies any significant chest pain  No palpitation  No presyncope  No syncope  No orthopnea  Satisfactory bowel and bladder activity  Chest x-ray shows excellent positioning of the atrial and the ventricular lead  Pacemaker interrogation shows excellent pacing and sensing parameters.  Patient is wanting to be discharged home      Condition on Discharge: Stable and Improved  ______________________________________________________________________________________________________________________________________________________________________________________________________________________________       Plan on discharge  ______________________________________________________________________________________________________________________________________________________________________________________________________________________________       OK to discharge  Low salt cardiac diet.   Discharge to home and or  self care with improvement or resolution of presenting symptoms or complaints  Ambulating    Optimal medical therapy  Deep vein thrombosis precautions with hydration and increasing mobility  Counseled regarding disease appropriate diet, fluid, sodium, caffeine, stimulants intake   Stressed compliance to diet and medications   Avoid NSAIDS    Follow-up in [place] [facility] clinic in 2 weeks  Keep follow-up with primary care provider as already scheduled    Extensive instructions regarding monitor for any signs of bleeding swelling any increasing pain fever or chills  Monitor for any signs of drainage from the pacemaker insertion site  Importance of regular follow-up including in the pacemaker device clinic  Use sling for 3 to 5 days, avoid lifting left arm for 2 weeks above shoulder level    Follow up with primary provider and primary cardiologist as scheduled   Overall improved and deemed fit for discharge  Will be provided with written discharge instructions including diet and medications including prescriptions as required and labs as applicable    Questions were encouraged, asked and answered to the patient's  understanding and satisfaction.    Go to nearest emergency room if recurrence of primary complaints or any suspected disabling or life threatening symptoms or complaints such as chest pain, increasing shortness of breath, profound dizziness, weakness, significant palpitations, passing out    episodes, cold sweats, excessive nausea etc  More than 35 minutes spent in the discharge process.   _______________________________________________________________________________________________________________________________________________________________________________________________________________________________________    Discharge Disposition: To home and self care  Home or Self Care    Discharge Medications     Discharge Medications      Continue These Medications      Instructions Start Date   ALPRAZolam  1 MG tablet  Commonly known as: XANAX   1 mg, Oral, 2 times daily      ALPRAZolam 2 MG tablet  Commonly known as: XANAX   2 mg, Oral, Nightly PRN      atorvastatin 40 MG tablet  Commonly known as: LIPITOR   40 mg, Oral, Nightly      busPIRone 15 MG tablet  Commonly known as: BUSPAR   45 mg, Oral, 2 times daily      levothyroxine 50 MCG tablet  Commonly known as: SYNTHROID, LEVOTHROID   50 mcg, Oral, Daily      melatonin 5 MG tablet tablet   10 mg, Oral, Nightly      nitroglycerin 0.4 MG SL tablet  Commonly known as: NITROSTAT   0.4 mg, Sublingual, Every 5 Minutes PRN, Take no more than 3 doses in 15 minutes.       pregabalin 100 MG capsule  Commonly known as: LYRICA   150 mg, Oral, Every Night at Bedtime      traZODone 50 MG tablet  Commonly known as: DESYREL   100 mg, Oral, Nightly             Discharge Diet:  Low salt heart healthy cardiac diet    Activity at Discharge:  As tolerated    Follow-up Appointments  No future appointments.      Test Results Pending at Discharge: None       Ranjith Stone MD  12/19/20  07:06 CST

## 2021-01-15 ENCOUNTER — TELEPHONE (OUTPATIENT)
Dept: CARDIOLOGY | Facility: CLINIC | Age: 61
End: 2021-01-15

## 2021-04-30 ENCOUNTER — TELEPHONE (OUTPATIENT)
Dept: CARDIOLOGY | Facility: CLINIC | Age: 61
End: 2021-04-30

## 2021-05-03 DIAGNOSIS — R07.9 CHEST PAIN IN ADULT: Primary | ICD-10-CM

## 2021-05-03 RX ORDER — SODIUM CHLORIDE 9 MG/ML
75 INJECTION, SOLUTION INTRAVENOUS CONTINUOUS
Status: CANCELLED | OUTPATIENT
Start: 2021-05-03

## 2021-05-04 PROBLEM — R07.9 CHEST PAIN IN ADULT: Status: ACTIVE | Noted: 2021-05-04

## 2021-05-05 ENCOUNTER — TRANSCRIBE ORDERS (OUTPATIENT)
Dept: LAB | Facility: HOSPITAL | Age: 61
End: 2021-05-05

## 2021-05-05 DIAGNOSIS — Z01.818 PREOP TESTING: Primary | ICD-10-CM

## 2021-05-10 ENCOUNTER — LAB (OUTPATIENT)
Dept: LAB | Facility: HOSPITAL | Age: 61
End: 2021-05-10

## 2021-05-10 LAB — SARS-COV-2 ORF1AB RESP QL NAA+PROBE: NOT DETECTED

## 2021-05-10 PROCEDURE — U0004 COV-19 TEST NON-CDC HGH THRU: HCPCS | Performed by: INTERNAL MEDICINE

## 2021-05-10 PROCEDURE — C9803 HOPD COVID-19 SPEC COLLECT: HCPCS | Performed by: INTERNAL MEDICINE

## 2021-05-12 ENCOUNTER — HOSPITAL ENCOUNTER (OUTPATIENT)
Facility: HOSPITAL | Age: 61
Discharge: HOME | End: 2021-05-12
Attending: INTERNAL MEDICINE | Admitting: INTERNAL MEDICINE

## 2021-05-12 VITALS
HEART RATE: 60 BPM | SYSTOLIC BLOOD PRESSURE: 99 MMHG | HEIGHT: 63 IN | DIASTOLIC BLOOD PRESSURE: 60 MMHG | WEIGHT: 143.4 LBS | BODY MASS INDEX: 25.41 KG/M2 | RESPIRATION RATE: 21 BRPM | OXYGEN SATURATION: 99 %

## 2021-05-12 DIAGNOSIS — R07.9 CHEST PAIN IN ADULT: ICD-10-CM

## 2021-05-12 LAB
ALBUMIN SERPL-MCNC: 4 G/DL (ref 3.5–5.2)
ALBUMIN/GLOB SERPL: 2.2 G/DL
ALP SERPL-CCNC: 73 U/L (ref 39–117)
ALT SERPL W P-5'-P-CCNC: 15 U/L (ref 1–33)
ANION GAP SERPL CALCULATED.3IONS-SCNC: 8 MMOL/L (ref 5–15)
AST SERPL-CCNC: 15 U/L (ref 1–32)
BASOPHILS # BLD AUTO: 0.03 10*3/MM3 (ref 0–0.2)
BASOPHILS NFR BLD AUTO: 0.5 % (ref 0–1.5)
BILIRUB SERPL-MCNC: 0.8 MG/DL (ref 0–1.2)
BUN SERPL-MCNC: 8 MG/DL (ref 8–23)
BUN/CREAT SERPL: 8.2 (ref 7–25)
CALCIUM SPEC-SCNC: 9.3 MG/DL (ref 8.6–10.5)
CHLORIDE SERPL-SCNC: 105 MMOL/L (ref 98–107)
CO2 SERPL-SCNC: 24 MMOL/L (ref 22–29)
CREAT SERPL-MCNC: 0.97 MG/DL (ref 0.57–1)
DEPRECATED RDW RBC AUTO: 46.8 FL (ref 37–54)
EOSINOPHIL # BLD AUTO: 0.06 10*3/MM3 (ref 0–0.4)
EOSINOPHIL NFR BLD AUTO: 1 % (ref 0.3–6.2)
ERYTHROCYTE [DISTWIDTH] IN BLOOD BY AUTOMATED COUNT: 14.1 % (ref 12.3–15.4)
GFR SERPL CREATININE-BSD FRML MDRD: 58 ML/MIN/1.73
GLOBULIN UR ELPH-MCNC: 1.8 GM/DL
GLUCOSE SERPL-MCNC: 94 MG/DL (ref 65–99)
HCT VFR BLD AUTO: 41.7 % (ref 34–46.6)
HGB BLD-MCNC: 13.8 G/DL (ref 12–15.9)
IMM GRANULOCYTES # BLD AUTO: 0.02 10*3/MM3 (ref 0–0.05)
IMM GRANULOCYTES NFR BLD AUTO: 0.3 % (ref 0–0.5)
INR PPP: 1.02 (ref 0.91–1.09)
LYMPHOCYTES # BLD AUTO: 1.88 10*3/MM3 (ref 0.7–3.1)
LYMPHOCYTES NFR BLD AUTO: 30.8 % (ref 19.6–45.3)
MCH RBC QN AUTO: 30.1 PG (ref 26.6–33)
MCHC RBC AUTO-ENTMCNC: 33.1 G/DL (ref 31.5–35.7)
MCV RBC AUTO: 90.8 FL (ref 79–97)
MONOCYTES # BLD AUTO: 0.46 10*3/MM3 (ref 0.1–0.9)
MONOCYTES NFR BLD AUTO: 7.5 % (ref 5–12)
NEUTROPHILS NFR BLD AUTO: 3.66 10*3/MM3 (ref 1.7–7)
NEUTROPHILS NFR BLD AUTO: 59.9 % (ref 42.7–76)
NRBC BLD AUTO-RTO: 0 /100 WBC (ref 0–0.2)
PLATELET # BLD AUTO: 236 10*3/MM3 (ref 140–450)
PMV BLD AUTO: 9.9 FL (ref 6–12)
POTASSIUM SERPL-SCNC: 4 MMOL/L (ref 3.5–5.2)
PROT SERPL-MCNC: 5.8 G/DL (ref 6–8.5)
PROTHROMBIN TIME: 12.6 SECONDS (ref 11.5–13.4)
RBC # BLD AUTO: 4.59 10*6/MM3 (ref 3.77–5.28)
SODIUM SERPL-SCNC: 137 MMOL/L (ref 136–145)
WBC # BLD AUTO: 6.11 10*3/MM3 (ref 3.4–10.8)

## 2021-05-12 PROCEDURE — 25010000002 HEPARIN (PORCINE) 2000-0.9 UNIT/L-% SOLUTION: Performed by: INTERNAL MEDICINE

## 2021-05-12 PROCEDURE — 99152 MOD SED SAME PHYS/QHP 5/>YRS: CPT | Performed by: INTERNAL MEDICINE

## 2021-05-12 PROCEDURE — 25010000002 FENTANYL CITRATE (PF) 100 MCG/2ML SOLUTION: Performed by: INTERNAL MEDICINE

## 2021-05-12 PROCEDURE — 25010000002 MIDAZOLAM HCL (PF) 5 MG/5ML SOLUTION: Performed by: INTERNAL MEDICINE

## 2021-05-12 PROCEDURE — C1894 INTRO/SHEATH, NON-LASER: HCPCS | Performed by: INTERNAL MEDICINE

## 2021-05-12 PROCEDURE — S0260 H&P FOR SURGERY: HCPCS | Performed by: INTERNAL MEDICINE

## 2021-05-12 PROCEDURE — 93458 L HRT ARTERY/VENTRICLE ANGIO: CPT | Performed by: INTERNAL MEDICINE

## 2021-05-12 PROCEDURE — C1760 CLOSURE DEV, VASC: HCPCS | Performed by: INTERNAL MEDICINE

## 2021-05-12 PROCEDURE — C1769 GUIDE WIRE: HCPCS | Performed by: INTERNAL MEDICINE

## 2021-05-12 PROCEDURE — 80053 COMPREHEN METABOLIC PANEL: CPT | Performed by: INTERNAL MEDICINE

## 2021-05-12 PROCEDURE — 0 IOPAMIDOL PER 1 ML: Performed by: INTERNAL MEDICINE

## 2021-05-12 PROCEDURE — 85025 COMPLETE CBC W/AUTO DIFF WBC: CPT | Performed by: INTERNAL MEDICINE

## 2021-05-12 PROCEDURE — 85610 PROTHROMBIN TIME: CPT | Performed by: INTERNAL MEDICINE

## 2021-05-12 PROCEDURE — 25010000002 HEPARIN (PORCINE) 1000-0.9 UT/500ML-% SOLUTION: Performed by: INTERNAL MEDICINE

## 2021-05-12 PROCEDURE — 25010000002 DIPHENHYDRAMINE PER 50 MG: Performed by: INTERNAL MEDICINE

## 2021-05-12 RX ORDER — HEPARIN SODIUM 200 [USP'U]/100ML
INJECTION, SOLUTION INTRAVENOUS AS NEEDED
Status: DISCONTINUED | OUTPATIENT
Start: 2021-05-12 | End: 2021-05-12 | Stop reason: HOSPADM

## 2021-05-12 RX ORDER — DIPHENHYDRAMINE HYDROCHLORIDE 50 MG/ML
INJECTION INTRAMUSCULAR; INTRAVENOUS AS NEEDED
Status: DISCONTINUED | OUTPATIENT
Start: 2021-05-12 | End: 2021-05-12 | Stop reason: HOSPADM

## 2021-05-12 RX ORDER — TEMAZEPAM 7.5 MG/1
7.5 CAPSULE ORAL NIGHTLY PRN
Status: DISCONTINUED | OUTPATIENT
Start: 2021-05-12 | End: 2021-05-12 | Stop reason: HOSPADM

## 2021-05-12 RX ORDER — SODIUM CHLORIDE 9 MG/ML
75 INJECTION, SOLUTION INTRAVENOUS CONTINUOUS
Status: DISCONTINUED | OUTPATIENT
Start: 2021-05-12 | End: 2021-05-12 | Stop reason: HOSPADM

## 2021-05-12 RX ORDER — SODIUM CHLORIDE 9 MG/ML
80 INJECTION, SOLUTION INTRAVENOUS CONTINUOUS
Status: DISCONTINUED | OUTPATIENT
Start: 2021-05-12 | End: 2021-05-12 | Stop reason: HOSPADM

## 2021-05-12 RX ORDER — FENTANYL CITRATE 50 UG/ML
INJECTION, SOLUTION INTRAMUSCULAR; INTRAVENOUS AS NEEDED
Status: DISCONTINUED | OUTPATIENT
Start: 2021-05-12 | End: 2021-05-12 | Stop reason: HOSPADM

## 2021-05-12 RX ORDER — ASPIRIN 81 MG/1
81 TABLET ORAL DAILY
COMMUNITY
End: 2022-06-08

## 2021-05-12 RX ORDER — CARVEDILOL 3.12 MG/1
3.12 TABLET ORAL 2 TIMES DAILY WITH MEALS
COMMUNITY
End: 2022-06-08

## 2021-05-12 RX ORDER — AMLODIPINE BESYLATE 5 MG/1
5 TABLET ORAL DAILY
COMMUNITY
End: 2022-06-08

## 2021-05-12 RX ORDER — MIDAZOLAM HYDROCHLORIDE 1 MG/ML
INJECTION, SOLUTION INTRAMUSCULAR; INTRAVENOUS AS NEEDED
Status: DISCONTINUED | OUTPATIENT
Start: 2021-05-12 | End: 2021-05-12 | Stop reason: HOSPADM

## 2021-05-12 RX ORDER — ACETAMINOPHEN 325 MG/1
650 TABLET ORAL EVERY 4 HOURS PRN
Status: DISCONTINUED | OUTPATIENT
Start: 2021-05-12 | End: 2021-05-12 | Stop reason: HOSPADM

## 2021-05-12 RX ORDER — DIPHENHYDRAMINE HCL 25 MG
25 CAPSULE ORAL EVERY 6 HOURS PRN
Status: DISCONTINUED | OUTPATIENT
Start: 2021-05-12 | End: 2021-05-12 | Stop reason: HOSPADM

## 2021-05-12 RX ORDER — LIDOCAINE HYDROCHLORIDE 20 MG/ML
INJECTION, SOLUTION INFILTRATION; PERINEURAL AS NEEDED
Status: DISCONTINUED | OUTPATIENT
Start: 2021-05-12 | End: 2021-05-12 | Stop reason: HOSPADM

## 2021-06-08 NOTE — THERAPY DISCHARGE NOTE
"     Outpatient Physical Therapy Ortho Treatment Note/Discharge Summary  [facility]     Patient Name: Denisa Michelle  : 1960  MRN: [id number]  Today's Date: 2018      Visit Date: 2018  Subjective Improvement: 60-70%       Attendance:    Approved: medicare guidelines           MD follow up: TBD           date: 18      Visit Dx:    ICD-10-CM ICD-9-CM   1. Generalized muscle weakness M62.81 728.87       There is no problem list on file for this patient.       Past Medical History:   Diagnosis Date   • Breast cancer (CMS/HCC)    • Diabetes mellitus (CMS/HCC)    • Rhabdomyolysis         Past Surgical History:   Procedure Laterality Date   • BREAST LUMPECTOMY     • CHOLECYSTECTOMY               PT Ortho     Row Name 18 1000       Subjective Comments    Subjective Comments Pt states she still wants to be DC'd today and will do silver sneakers; states she has chronic LBP and it is aggravated this a.m. for some reason, \"woke up with it\".  -PM       Precautions and Contraindications    Precautions/Limitations fall precautions  -PM       Subjective Pain    Able to rate subjective pain? yes  -PM    Pre-Treatment Pain Level 7  -PM       MMT Right Lower Ext    Rt Hip Flexion MMT, Gross Movement (4+/5) good plus  -PM    Rt Hip ABduction MMT, Gross Movement (4+/5) good plus  -PM    Rt Hip ADduction MMT, Gross Movement (4+/5) good plus  -PM    Rt Knee Extension MMT, Gross Movement (4+/5) good plus  -PM    Rt Knee Flexion MMT, Gross Movement (4+/5) good plus  -PM       MMT Left Lower Ext    Lt Hip Flexion MMT, Gross Movement (4+/5) good plus  -PM    Lt Hip ABduction MMT, Gross Movement (4+/5) good plus  -PM    Lt Hip ADduction MMT, Gross Movement (4+/5) good plus  -PM    Lt Knee Extension MMT, Gross Movement (4+/5) good plus  -PM    Lt Knee Flexion MMT, Gross Movement (4+/5) good plus  -PM    Row Name 18 1000       Precautions and Contraindications    " Dr. Luther Kessler please advise.    This message is being sent by Niki Limon on behalf of Nadege Limon.    One follow-up to the last message --  The other thing I wanted to mention is that I have noticed she is often tripping, and with that, often hitting her head.  I realize she is still young but she still seems so unsteady on her feet and balance does not seem to really be improving over time (even when on flat surfaces.)     Thanks- Niki     "Precautions/Limitations fall precautions  -PM       Subjective Pain    Able to rate subjective pain? yes  -PM      User Key  (r) = Recorded By, (t) = Taken By, (c) = Cosigned By    Initials Name Provider Type    Anna Mirza PTA Physical Therapy Assistant                            PT Assessment/Plan     Row Name 11/08/18 1000          PT Assessment    Assessment Comments Improved DGI; incr strength; decr reps cybex d/t back pn; pt demon good understanding of HEP and balance activities to perform at kitchen counter; she will do silver sneakers and intro made. May need staff assistance initially. Pt DC today at her request and goals met. Pt did well with transfers out of floor after education on how to do without something to assist her.  -PM     Rehab Potential Good  -PM     Patient/caregiver participated in establishment of treatment plan and goals Yes  -PM     Patient would benefit from skilled therapy intervention Yes  -PM        PT Plan    PT Plan Comments DC to I HEP; fitness with staff assist as needed.  -PM       User Key  (r) = Recorded By, (t) = Taken By, (c) = Cosigned By    Initials Name Provider Type    Anna Mirza PTA Physical Therapy Assistant                    Exercises     Row Name 11/08/18 1000             Precautions    Existing Precautions/Restrictions no known precautions/restrictions  -PM         Subjective Comments    Subjective Comments Pt states she still wants to be DC'd today and will do silver sneakers; states she has chronic LBP and it is aggravated this a.m. for some reason, \"woke up with it\".  -PM         Subjective Pain    Able to rate subjective pain? yes  -PM      Pre-Treatment Pain Level 7  -PM      Post-Treatment Pain Level 7  -PM         Aquatics    Aquatics performed? No  -PM         Exercise 1    Exercise Name 1 PRO II for ROM/endurance  -PM      Time 1 10 min  -PM      Additional Comments L3  -PM         Exercise 2    Exercise Name 2 Airex CR/TR  -PM   "    Reps 2 10  -PM         Exercise 3    Exercise Name 3 Airex mini squats  -PM      Reps 3 10  -PM         Exercise 4    Exercise Name 4 Airex slow march  -PM      Reps 4 10  -PM         Exercise 5    Exercise Name 5 Cybex LP2  -PM      Cueing 5 Verbal  -PM      Sets 5 1  -PM      Reps 5 10  -PM      Additional Comments 70#  -PM         Exercise 6    Exercise Name 6 Cybex ham curl  -PM      Cueing 6 Verbal  -PM      Sets 6 1  -PM      Reps 6 10  -PM      Additional Comments 30#  -PM         Exercise 7    Exercise Name 7 Cybex Hip abd/add  -PM      Cueing 7 Verbal  -PM      Sets 7 1  -PM      Reps 7 10  -PM      Additional Comments 30#  -PM         Exercise 8    Exercise Name 8 AirEx beam tandem gait  -PM      Cueing 8 Verbal  -PM      Reps 8 4 laps  -PM         Exercise 9    Exercise Name 9 AirEx Beam lat step no HHA  -PM      Cueing 9 Verbal  -PM      Reps 9 3  -PM         Exercise 10    Exercise Name 10 backward walking // bars  -PM      Reps 10 3 laps  -PM         Exercise 11    Exercise Name 11 slow march walk // bars  -PM      Reps 11 2 laps  -PM         Exercise 12    Exercise Name 12 SLB w/finger sweep  -PM      Cueing 12 Verbal  -PM      Reps 12 --  -PM      Time 12 --  -PM      Additional Comments verbal review  -PM         Exercise 13    Exercise Name 13 recip stairs no HR  -PM      Reps 13 4x  -PM         Exercise 14    Exercise Name 14 DGI  -PM      Additional Comments performed-see outcome measures for details  -PM         Exercise 15    Exercise Name 15 fitness flow sheet set up and intro to staff  -PM         Exercise 16    Exercise Name 16 floor t/f training util mat table to push up  -PM      Cueing 16 Verbal;Demo  -PM      Time 16 3'  -PM         Exercise 17    Exercise Name 17 floor t/f to std without table  -PM      Cueing 17 Verbal;Tactile;Demo  -PM      Time 17 3'  -PM        User Key  (r) = Recorded By, (t) = Taken By, (c) = Cosigned By    Initials Name Provider Type    PM Anna Means,  "PTA Physical Therapy Assistant                               PT OP Goals     Row Name 11/08/18 1000          PT Short Term Goals    STG Date to Achieve 10/29/18  -PM     STG 1 Independent/compliant with HEP  -PM     STG 1 Progress Met  -PM     STG 2 Tolerate 45 minute treatment session without increased pain  -PM     STG 2 Progress Met  -PM     STG 3 Improve DGI score to 20/24 to demonstrate decreased fall risk and improved safety with functional mobility  -PM     STG 3 Progress Met  -PM     STG 4 Demonstrate bilateral hip flex MMT to 4+/5  -PM     STG 4 Progress Met  -PM     STG 5 Demonstrate airex FT/EC static stance for 30\" or greater  -PM     STG 5 Progress Met  -PM        Long Term Goals    LTG Date to Achieve 11/12/18  -PM     LTG 1 Subjectively report 60% improvement or greater  -PM     LTG 1 Progress Met  -PM     LTG 2 Improve DGI score to 22/24 to demonstrate decreased fall risk and improved safety with functional mobility  -PM     LTG 2 Progress Met  -PM     LTG 3 Perform Tandem R/L leading on airex for 25\" or greater  -PM     LTG 3 Progress Met  -PM     LTG 4 Demonstrate bilateral hip abd MMT to 4+5 or greater  -PM     LTG 4 Progress Met  -PM     LTG 5 Ascend/descend 5 steps x 5, reciprocal pattern, min to no UE handrail assist  -PM     LTG 5 Progress Met  -PM        Time Calculation    PT Goal Re-Cert Due Date 11/12/18  -PM       User Key  (r) = Recorded By, (t) = Taken By, (c) = Cosigned By    Initials Name Provider Type    Anna Mirza PTA Physical Therapy Assistant          Therapy Education  Given: HEP, Symptoms/condition management, Posture/body mechanics, Fall prevention and home safety, Mobility training  Program: Reinforced  How Provided: Verbal, Demonstration, Written  Provided to: Patient  Level of Understanding: Teach back education performed, Verbalized, Demonstrated    Outcome Measure Options: Dynamic Gait Index  Dynamic Gait Index (DGI)  Gait Level Surface: Normal: walks 20’, no " assistive devices, good speed, no evidence for imbalance, normal gait pattern  Change in Gait Speed: Normal: Able to smoothly change walking speed without loss of balance or gait deviation. Shows significant difference in walking speeds between normal, fast and slow paces.  Gait with Horizontal Head Turns: Mild impairment: Performs head turns smoothly with slight change in gait velocity, i.e. minor disruption to smooth gait path or uses walking aid.  Gait with Vertical Head Turns: Normal: Performs head turns smoothly with no change in gait.  Gait and Pivot Turn: Normal: Pivot turns safely within 3 seconds and stops quickly with no loss of balance.  Step Over Obstacle: Normal: Is able to step over box without changing gait speed, no evidence for imbalance.  Step Around Obstacles: Normal: Is able to walk safely around cones safely without changing gait speed, no evidence of imbalance.  Steps: Normal: Alternating feet, no rail.  Dynamic Gait Index Score: 23      Time Calculation:   Start Time: 1017  Stop Time: 1105  Time Calculation (min): 48 min  Total Timed Code Minutes- PT: 48 minute(s)  Therapy Suggested Charges     Code   Minutes Charges    None           Therapy Charges for Today     Code Description Service Date Service Provider Modifiers Qty    [id number] HC PT THER PROC EA 15 MIN 11/8/2018 Anna Means PTA GP 3          PT G-Codes  Outcome Measure Options: Dynamic Gait Index     OP PT Discharge Summary  Date of Discharge: 11/08/18  Reason for Discharge: All goals achieved  Discharge Instructions/Additional Comments: pt will do silver sneakers program with fitness      Anna Means PTA  11/8/2018

## 2021-08-09 ENCOUNTER — TRANSCRIBE ORDERS (OUTPATIENT)
Dept: PHYSICAL THERAPY | Facility: CLINIC | Age: 61
End: 2021-08-09

## 2021-08-09 DIAGNOSIS — R42 VERTIGO: Primary | ICD-10-CM

## 2021-08-12 ENCOUNTER — TREATMENT (OUTPATIENT)
Dept: PHYSICAL THERAPY | Facility: CLINIC | Age: 61
End: 2021-08-12

## 2021-08-12 DIAGNOSIS — R26.89 BALANCE PROBLEM: ICD-10-CM

## 2021-08-12 DIAGNOSIS — R42 VERTIGO: Primary | ICD-10-CM

## 2021-08-12 PROCEDURE — 97161 PT EVAL LOW COMPLEX 20 MIN: CPT | Performed by: PHYSICAL THERAPIST

## 2021-08-12 PROCEDURE — 97112 NEUROMUSCULAR REEDUCATION: CPT | Performed by: PHYSICAL THERAPIST

## 2021-08-12 NOTE — PROGRESS NOTES
"Physical Therapy Initial Evaluation and Plan of Care      Patient: Denisa Michelle   : 1960  Diagnosis/ICD-10 Code:  Vertigo [R42]  Referring practitioner: Bobby Da Silva MD  Date of Initial Visit: 2021  Today's Date: 2021  Patient seen for 1 sessions    Recheck due: 2021  MD appt: Next week    *Fibromyalgia, hx of breast CA, Pacemaker*         Subjective Questionnaire: DHI: 34; mild/minimal perception of handicap      Subjective Evaluation    History of Present Illness  Mechanism of injury: Pt with c/o dizziness. Started at end of last year. At that time she had started feeling weak and her heart was always really low, running about 30 BPM sometimes. Ended up getting a pacemaker put it. Reports it helped a lot with her symptoms. Still having issues with dizziness. States it only really happens when laying her head back when she goes to the dentist or getting hair cut/ shampooed. Initially it would feel like the room was spinning but now it's just an \"off feeling\" in her head. Sometimes can't keep gait steady after it happens. At one point ent to ER and had CT scan, which was negative. Throughout the day has no issues. Some days she doesn't have any symptoms. Rolling over or getting in and out of bed doesn't bother her. Looking up bothers her only a little. On a normal day, doesn't have any issues with her balance. Hx of fibromyalgia. Usual back ache today. Denies much neck pain. No falls. Ambulates with no AD.      Patient Occupation: Pt enjoys walking/spending time with dog Pain  Current pain ratin  At best pain ratin  At worst pain ratin  Location: Low back  Quality: dull ache  Relieving factors: change in position  Aggravating factors: ambulation, prolonged positioning and movement  Progression: no change    Social Support  Lives in: one-story house (2 steps at front, 3-4 steps at side door)  Lives with: alone    Diagnostic Tests  CT scan: normal (per pt report)    Patient " "Goals  Patient goals for therapy: improved balance and increased motion  Patient goal: Get rid of dizziness           Objective          Postural Observations  Seated posture: poor  Standing posture: poor    Additional Postural Observation Details  Decreased/poor overall postural awareness. Quite slumped when seated. Forward head posture, slight L shift at cervical spine    Neurological Testing     Sensation   Cervical/Thoracic   Left   Intact: light touch    Right   Intact: light touch    Active Range of Motion   Cervical/Thoracic Spine   Cervical    Flexion: 60 degrees   Extension: 35 degrees   Left lateral flexion: 35 degrees   Right lateral flexion: 35 degrees   Left rotation: 60 degrees   Right rotation: 56 degrees     Additional Active Range of Motion Details  - Some very mild dizziness reported with cervical ext  - Bilateral shoulder, elbow, wrist AROM WFL  - Bilateral hip, knee, ankle AROM WFL    Strength/Myotome Testing     Left Shoulder     Planes of Motion   Flexion: 4+   Abduction: 4+     Right Shoulder     Planes of Motion   Flexion: 4+   Abduction: 4+     Left Elbow   Flexion: 5  Extension: 5    Right Elbow   Flexion: 5  Extension: 5    Left Wrist/Hand   Wrist extension: 5  Wrist flexion: 5    Right Wrist/Hand   Wrist extension: 5  Wrist flexion: 5    Left Hip   Planes of Motion   Flexion: 4+  Abduction: 4    Right Hip   Planes of Motion   Flexion: 4+  Abduction: 4    Left Knee   Flexion: 4+  Extension: 4+    Right Knee   Flexion: 4+  Extension: 4+    Left Ankle/Foot   Dorsiflexion: 5  Plantar flexion: 5    Right Ankle/Foot   Dorsiflexion: 5  Plantar flexion: 5    Ambulation     Comments   Ambulates with no AD, antalgic gait. Slightly fwd flexed at hips. Decreased anastasiia and step height.    Functional Assessment     Comments  Static balance:  - FA/EO 30\", FA/EC 30\"  - FT/EO 30\", FT/EC 30\" increased sway  - Airex: FA/EO 30\" increased sway, FA/EC 26\"  - Airex: FT/EO 30\", FT/EC 19\" mod sway  - Tandem R " "lead 25\", L lead 21\"    Gaze stabilizationVOR:  - No nystagmus or dizziness symptoms reported with seated vertical/horizontal head movements    Saccades  - No nystagmus  - Dizziness symptoms reported with 2 point vertical eye movements; none reported with 2 point horizontal eye movements         Cognition  Orientation Level: Oriented to place, Oriented to time, Oriented to person, Oriented to situation  Subjective Outcome Measures  Dizziness Handicap Inventory: Minimal Perception of having a handicap  Symptom Behavior  Type of Dizziness: Imbalance, Funny feeling in head  Frequency of Dizziness: Other (1-2x/week or less)  Duration of Dizziness: Seconds  VAS Intensity (0-10): 0  Aggravating Factors: Looking up to the ceiling, Sitting with head tilted back  Occulomotor Exam Fixation Present  Spontaneous Nystagmus: Absent  Gaze-induced Nystagmus: Absent  Head Shaking Horizontal: Absent  Head Shaking Vertical: Absent        Assessment & Plan     Assessment  Impairments: abnormal gait, abnormal or restricted ROM, activity intolerance, impaired balance, impaired physical strength and lacks appropriate home exercise program  Assessment details: Pt is a 61 y.o. female presenting with dizziness symptoms and dynamic balance deficits. Pt's dizziness occurs mildly with seated cervical ext but mostly apparent when laying down with head back. Pt does not report any vertigo/spining symptoms or nystagmus throughout treatment or testing. Vertical saccades did cause increase in dizziness but resolved quickly. Pt with history of pacemaker placement in December 2021 and reports her symptoms have improved since then. Pt does present with deficits in overall cervical/postural stability as well as limitations in cervical mobility. Static balance more challenging on compliant surface and with eyes close. Limitations noted in dynamic balance as well. Pt will benefit from skilled PT services to address these deficits for improvements in dizzy " "symptoms, cervical/postural stability, functional strength, dynamic balance, and functional activity tolerance.  Prognosis: good  Functional Limitations: walking, moving in bed, standing and unable to perform repetitive tasks  Goals  Plan Goals: STG's by 9/2/2021  1) Demonstrate independence/compliance in performance of initial HEP  2) Demonstrate improved seated posture/pelvic neutral positioning with seated core/postural stabilization activities  3) Demonstrate improved cervical ext AROM to 45 deg without dizziness  4) Perform static balance on airex with FT/EC for 30\" with no LOB  5) Demonstrate improved bilateral hip abd MMT to 4+/5    LTG's by 9/23/2021  1) Subjectively report 60% overall improvement or greater  2) Demonstrate improved bilateral shoulder flex/abd MMT to 5/5  3) Demonstrate improved bilateral hip MM to 5/5 in all planes  4) Demonstrate improved bilateral cervical rotation to 65 deg or greater  5) Perform obstacle/balance course in hallway consisting of compliant surfaces, bench step, hurdles, and cone weave with no LOB, good control; SBA  6) Perform standing saccades and gaze stabilization activities on airex with no dizziness reported    Plan  Therapy options: will be seen for skilled physical therapy services  Planned modality interventions: thermotherapy (hydrocollator packs) and cryotherapy  Planned therapy interventions: abdominal trunk stabilization, balance/weight-bearing training, body mechanics training, functional ROM exercises, home exercise program, motor coordination training, neuromuscular re-education, postural training, strengthening, stretching and therapeutic activities  Duration in visits: 12  Treatment plan discussed with: patient  Plan details: Work on overall postural/cervical stability and cervical ROM activities. Needs work on posture/body mechanics. BLE strengthening, static/dynamic balance activities. Continue gaze stabilization/VOR and saccades; progress from seated to " standing.        Timed:  Manual Therapy:         mins  94859;  Therapeutic Exercise:         mins  15823;     Neuromuscular Ena:   15     mins  68552;    Therapeutic Activity:          mins  65793;     Gait Training:           mins  39924;     Ultrasound:          mins  44406;    Electrical Stimulation:         mins  40869 ( );  Dry Needling:                     mins  self-pay;    Untimed:  Electrical Stimulation:         mins  42561 ( );  Mechanical Traction:         mins  29827;     Timed Treatment:  15    mins   Total Treatment:     43   mins    PT SIGNATURE: Christiana Schwarz, PT   DATE TREATMENT INITIATED: 8/12/2021    Initial Certification  Certification Period: 11/10/2021  I certify that the therapy services are furnished while this patient is under my care.  The services outlined above are required by this patient, and will be reviewed every 90 days.     PHYSICIAN: Bobby Da Silva MD      DATE:     Please sign and return via fax to [phone].. Thank you, [facility].

## 2021-08-13 ENCOUNTER — TREATMENT (OUTPATIENT)
Dept: PHYSICAL THERAPY | Facility: CLINIC | Age: 61
End: 2021-08-13

## 2021-08-13 DIAGNOSIS — R26.89 BALANCE PROBLEM: ICD-10-CM

## 2021-08-13 DIAGNOSIS — R42 VERTIGO: Primary | ICD-10-CM

## 2021-08-13 PROCEDURE — 97110 THERAPEUTIC EXERCISES: CPT | Performed by: PHYSICAL THERAPIST

## 2021-08-13 PROCEDURE — 97112 NEUROMUSCULAR REEDUCATION: CPT | Performed by: PHYSICAL THERAPIST

## 2021-08-13 NOTE — PROGRESS NOTES
"   Physical Therapy Daily Treatment Note      Patient: Denisa Michelle   : 1960  Referring practitioner: Bobby Da Silva MD  Date of Initial Visit: Type: THERAPY  Noted: 2021  Today's Date: 2021  Patient seen for 2 sessions    Recheck due: 2021  MD appt: Next week    *Fibromyalgia, hx of breast CA, Pacemaker*         Subjective Evaluation    History of Present Illness    Subjective comment: Pt states she did a little of her exercises yesterday after PT but was worn out from traveling. States driving/riding and going places sometimes makes her tired.Pain  No pain reported           Objective          Postural Observations    Additional Postural Observation Details  Frequent postural cues needed throughout treatment.      See Exercise, Manual, and Modality Logs for complete treatment.       Assessment & Plan     Assessment  Assessment details: Pt had no dizzy symptoms with seated gaze stabilization or saccades performed this date. Did need cues for proper head movement and posture/positioning with gaze stabilization; not always smooth and sits very slumped. Did well new therex activities for scap stability and standing balance with cues.  Prognosis: good    Goals  Plan Goals: STG's by 2021  1) Demonstrate independence/compliance in performance of initial HEP  2) Demonstrate improved seated posture/pelvic neutral positioning with seated core/postural stabilization activities  3) Demonstrate improved cervical ext AROM to 45 deg without dizziness  4) Perform static balance on airex with FT/EC for 30\" with no LOB  5) Demonstrate improved bilateral hip abd MMT to 4+/5    LTG's by 2021  1) Subjectively report 60% overall improvement or greater  2) Demonstrate improved bilateral shoulder flex/abd MMT to 5/5  3) Demonstrate improved bilateral hip MM to 5/5 in all planes  4) Demonstrate improved bilateral cervical rotation to 65 deg or greater  5) Perform obstacle/balance course in hallway consisting " of compliant surfaces, bench step, hurdles, and cone weave with no LOB, good control; SBA  6) Perform standing saccades and gaze stabilization activities on airex with no dizziness reported    Plan  Plan details: Standing hip abd/ext SLR next. Try cervical rotation with ball/wall assist. Continue progressing balance and standing activities.        Visit Diagnoses:    ICD-10-CM ICD-9-CM   1. Vertigo  R42 780.4   2. Balance problem  R26.89 781.99       Progress per Plan of Care and Progress strengthening /stabilization /functional activity           Timed:  Manual Therapy:         mins  84814;  Therapeutic Exercise:    25     mins  45522;     Neuromuscular Ena:    15    mins  70881;    Therapeutic Activity:          mins  60943;     Gait Training:           mins  76301;     Ultrasound:          mins  55061;    Electrical Stimulation:         mins  63470 ( );    Untimed:  Electrical Stimulation:         mins  82478 ( );  Mechanical Traction:         mins  51233;     Timed Treatment:   40   mins   Total Treatment:     40   mins  Christiana Schwarz, PT  Physical Therapist

## 2021-08-16 ENCOUNTER — TREATMENT (OUTPATIENT)
Dept: PHYSICAL THERAPY | Facility: CLINIC | Age: 61
End: 2021-08-16

## 2021-08-16 DIAGNOSIS — R26.89 BALANCE PROBLEM: ICD-10-CM

## 2021-08-16 DIAGNOSIS — M62.81 GENERALIZED MUSCLE WEAKNESS: ICD-10-CM

## 2021-08-16 DIAGNOSIS — R42 VERTIGO: Primary | ICD-10-CM

## 2021-08-16 PROCEDURE — 97110 THERAPEUTIC EXERCISES: CPT | Performed by: PHYSICAL THERAPIST

## 2021-08-16 PROCEDURE — 97530 THERAPEUTIC ACTIVITIES: CPT | Performed by: PHYSICAL THERAPIST

## 2021-08-16 NOTE — PROGRESS NOTES
"   Physical Therapy Daily Progress Note      Patient: Denisa Michelle   : 1960  Referring practitioner: Bobby Da Silva MD  Date of Initial Visit: Type: THERAPY  Noted: 2021  Today's Date: 2021  Patient seen for 3 sessions    Recheck due: 2021  MD appt: Next week     *Fibromyalgia, hx of breast CA, Pacemaker*        Denisa Michelle reports: ?maybe a little better  Subjective Evaluation    History of Present Illness    Subjective comment: pt states she is doing pretty good today; only miild dizziness at times; no pain; no big issue with HEP/does the head/eye stuff sitting downPain  No pain reported           Objective          Static Posture     Comments  Seated tends to slouch - cues given for sitting tall on cheekbones / forward head    Standing: sway back/forward head - cues given for std tall, how to tighten belly button, knees not locked out        See Exercise, Manual, and Modality Logs for complete treatment.       Assessment & Plan     Assessment  Assessment details: Pt cont to require cues for fixed gaze and head movement - tends to not rotate toward L but rather go into lat flex some (supine required visual cues for head rotation/PTA  Hands as well as verb cues for looking in the direction of head turn supine). Poor posture awareness and multiple cues in both seated and standing positions; challenging for ball wall and max cues/posture assist. Good effort. Did well with Pro II gentle conditioning / posture kenia as well as stand LE strength.  Prognosis: good    Goals  Plan Goals: STG's by 2021  1) Demonstrate independence/compliance in performance of initial HEP  2) Demonstrate improved seated posture/pelvic neutral positioning with seated core/postural stabilization activities  3) Demonstrate improved cervical ext AROM to 45 deg without dizziness  4) Perform static balance on airex with FT/EC for 30\" with no LOB  5) Demonstrate improved bilateral hip abd MMT to 4+/5    LTG's by " 9/23/2021  1) Subjectively report 60% overall improvement or greater  2) Demonstrate improved bilateral shoulder flex/abd MMT to 5/5  3) Demonstrate improved bilateral hip MM to 5/5 in all planes  4) Demonstrate improved bilateral cervical rotation to 65 deg or greater  5) Perform obstacle/balance course in hallway consisting of compliant surfaces, bench step, hurdles, and cone weave with no LOB, good control; SBA  6) Perform standing saccades and gaze stabilization activities on airex with no dizziness reported    Plan  Plan details: Possibly update HEP with standing LE strength; cont posture kenia and cervical retract/CT with manual guided assist; gradual work toward dynamic balance as well.        Visit Diagnoses:    ICD-10-CM ICD-9-CM   1. Vertigo  R42 780.4   2. Balance problem  R26.89 781.99   3. Generalized muscle weakness  M62.81 728.87       Progress per Plan of Care           Timed:  Manual Therapy:         mins  50240;  Therapeutic Exercise:    30     mins  20833;     Neuromuscular Kenia:        mins  77348;    Therapeutic Activity:     24     mins  32337;     Gait Training:           mins  43646;     Ultrasound:          mins  44695;    Electrical Stimulation:         mins  00610 ( );    Untimed:  Electrical Stimulation:         mins  63626 ( );  Mechanical Traction:         mins  50584;  Paraffin                               mins  84220;     Timed Treatment:   54   mins   Total Treatment:     54   mins  Anna Means PTA  Physical Therapist

## 2021-08-18 ENCOUNTER — TREATMENT (OUTPATIENT)
Dept: PHYSICAL THERAPY | Facility: CLINIC | Age: 61
End: 2021-08-18

## 2021-08-18 DIAGNOSIS — R42 VERTIGO: Primary | ICD-10-CM

## 2021-08-18 DIAGNOSIS — R26.89 BALANCE PROBLEM: ICD-10-CM

## 2021-08-18 DIAGNOSIS — M62.81 GENERALIZED MUSCLE WEAKNESS: ICD-10-CM

## 2021-08-18 PROCEDURE — 97110 THERAPEUTIC EXERCISES: CPT | Performed by: PHYSICAL THERAPIST

## 2021-08-18 PROCEDURE — 97112 NEUROMUSCULAR REEDUCATION: CPT | Performed by: PHYSICAL THERAPIST

## 2021-08-18 PROCEDURE — 97530 THERAPEUTIC ACTIVITIES: CPT | Performed by: PHYSICAL THERAPIST

## 2021-08-18 NOTE — PROGRESS NOTES
"   Physical Therapy Daily Treatment Note      Patient: Denisa Michelle   : 1960  Referring practitioner: Bobby Da Silva MD  Date of Initial Visit: Type: THERAPY  Noted: 2021  Today's Date: 2021  Patient seen for 4 sessions    Recheck due: 2021  MD appt: Next week     *Fibromyalgia, hx of breast CA, Pacemaker*     Denisa Michelle reports: \"maybe a little better\"        Subjective Evaluation    History of Present Illness    Subjective comment: pt states that her legs are sore from some of the new LE strengthening exercises. Feels okay other than that.Pain  Pain scale: legs are sore.           Objective          Static Posture     Comments  Fwd head/ rounded shoulders. Kyphotic posture      See Exercise, Manual, and Modality Logs for complete treatment.       Assessment & Plan     Assessment  Assessment details: Pt head no instances of dizziness throughout treatment. The visual seccades horizontal and vertical bothered her the most but still did not cause dizziness. Pt required mod/max cues for upright posture as well as bringing head back to neutral position as she tended to have a fwd head. Pt did show several instances where she corrected her posture on her own without any cues, so she is becoming more self aware. Added no moneys w/ tband, standing march, and standing hip abd/ext to HEP.    Goals  Plan Goals: STG's by 2021  1) Demonstrate independence/compliance in performance of initial HEP  2) Demonstrate improved seated posture/pelvic neutral positioning with seated core/postural stabilization activities  3) Demonstrate improved cervical ext AROM to 45 deg without dizziness  4) Perform static balance on airex with FT/EC for 30\" with no LOB  5) Demonstrate improved bilateral hip abd MMT to 4+/5    LTG's by 2021  1) Subjectively report 60% overall improvement or greater  2) Demonstrate improved bilateral shoulder flex/abd MMT to 5/5  3) Demonstrate improved bilateral hip MM to 5/5 in all " planes  4) Demonstrate improved bilateral cervical rotation to 65 deg or greater  5) Perform obstacle/balance course in hallway consisting of compliant surfaces, bench step, hurdles, and cone weave with no LOB, good control; SBA  6) Perform standing saccades and gaze stabilization activities on airex with no dizziness reported        Plan  Duration in visits: 12  Plan details: Try standing on airex FA and doing visual activities        Visit Diagnoses:    ICD-10-CM ICD-9-CM   1. Vertigo  R42 780.4   2. Balance problem  R26.89 781.99   3. Generalized muscle weakness  M62.81 728.87       Progress per Plan of Care and Progress strengthening /stabilization /functional activity           Timed:  Manual Therapy:         mins  16038;  Therapeutic Exercise:    30     mins  68965;     Neuromuscular Ena:    10    mins  93380;    Therapeutic Activity:     15     mins  89646;     Gait Training:           mins  28410;     Ultrasound:          mins  61419;    Electrical Stimulation:         mins  23123 ( );    Untimed:  Electrical Stimulation:         mins  75360 ( );  Mechanical Traction:         mins  94931;     Timed Treatment:   55   mins   Total Treatment:     55   mins  Kayli Gerard, [facility]  Physical Therapist

## 2021-08-23 ENCOUNTER — TREATMENT (OUTPATIENT)
Dept: PHYSICAL THERAPY | Facility: CLINIC | Age: 61
End: 2021-08-23

## 2021-08-23 DIAGNOSIS — R42 VERTIGO: Primary | ICD-10-CM

## 2021-08-23 DIAGNOSIS — M62.81 GENERALIZED MUSCLE WEAKNESS: ICD-10-CM

## 2021-08-23 DIAGNOSIS — R26.89 BALANCE PROBLEM: ICD-10-CM

## 2021-08-23 PROCEDURE — 97110 THERAPEUTIC EXERCISES: CPT | Performed by: PHYSICAL THERAPIST

## 2021-08-23 PROCEDURE — 97112 NEUROMUSCULAR REEDUCATION: CPT | Performed by: PHYSICAL THERAPIST

## 2021-08-23 NOTE — PROGRESS NOTES
"   Physical Therapy Daily Progress Note      Patient: Denisa Michelle   : 1960  Referring practitioner: Bobby Da Silva MD  Date of Initial Visit: Type: THERAPY  Noted: 2021  Today's Date: 2021  Patient seen for 5 sessions    Recheck due: 2021  MD appt: Next week     *Fibromyalgia, hx of breast CA, Pacemaker*             Denisa Michelle reports: 50% improved  Subjective Evaluation    History of Present Illness    Subjective comment: pt states she is sorry she is late but lost track of time; not dizzy today but still am at times; somewhat anxious as is my nature and more so because I was running late.Pain  No pain reported           Objective          Functional Assessment     Comments  FT EO on Airex controlled, no sway, no c/o's dizziness    Feet Apart EO on Airex with head/eye excs, controlled minimal sway at times with reports of minimal dizziness w/head movement      See Exercise, Manual, and Modality Logs for complete treatment.       Assessment & Plan     Assessment  Assessment details: Pt reports improvement in balance and some in strength/endurance but fatigues easily. Did well with Airex head/eye excs. Very satisfactory progress.    Goals  Plan Goals: STG's by 2021  1) Demonstrate independence/compliance in performance of initial HEP-met  2) Demonstrate improved seated posture/pelvic neutral positioning with seated core/postural stabilization activities-progressing well  3) Demonstrate improved cervical ext AROM to 45 deg without dizziness  4) Perform static balance on airex with FT/EC for 30\" with no LOB - attempt nxt  5) Demonstrate improved bilateral hip abd MMT to 4+/5    LTG's by 2021  1) Subjectively report 60% overall improvement or greater  2) Demonstrate improved bilateral shoulder flex/abd MMT to 5/5  3) Demonstrate improved bilateral hip MM to 5/5 in all planes  4) Demonstrate improved bilateral cervical rotation to 65 deg or greater  5) Perform obstacle/balance course in " "hallway consisting of compliant surfaces, bench step, hurdles, and cone weave with no LOB, good control; SBA  6) Perform standing saccades and gaze stabilization activities on airex with no dizziness reported        Plan  Duration in visits: 12  Plan details: Try standing FT EC on Airex 30\" for goal        Visit Diagnoses:    ICD-10-CM ICD-9-CM   1. Vertigo  R42 780.4   2. Balance problem  R26.89 781.99   3. Generalized muscle weakness  M62.81 728.87       Progress per Plan of Care           Timed:  Manual Therapy:         mins  09008;  Therapeutic Exercise:    20     mins  05845;     Neuromuscular Ena:    31    mins  12549;    Therapeutic Activity:          mins  46298;     Gait Training:           mins  97208;     Ultrasound:          mins  88352;    Electrical Stimulation:         mins  14806 ( );    Untimed:  Electrical Stimulation:         mins  60600 ( );  Mechanical Traction:         mins  35125;  Paraffin                               mins  20628;     Timed Treatment:   51   mins   Total Treatment:     51   mins  Anna Means PTA  Physical Therapist                  "

## 2021-08-30 ENCOUNTER — TREATMENT (OUTPATIENT)
Dept: PHYSICAL THERAPY | Facility: CLINIC | Age: 61
End: 2021-08-30

## 2021-08-30 DIAGNOSIS — R26.89 BALANCE PROBLEM: ICD-10-CM

## 2021-08-30 DIAGNOSIS — M62.81 GENERALIZED MUSCLE WEAKNESS: ICD-10-CM

## 2021-08-30 DIAGNOSIS — R42 VERTIGO: Primary | ICD-10-CM

## 2021-08-30 PROCEDURE — 97112 NEUROMUSCULAR REEDUCATION: CPT | Performed by: PHYSICAL THERAPIST

## 2021-08-30 PROCEDURE — 97110 THERAPEUTIC EXERCISES: CPT | Performed by: PHYSICAL THERAPIST

## 2021-08-30 NOTE — PROGRESS NOTES
"   Physical Therapy Daily Treatment Note      Patient: Denisa Michelle   : 1960  Referring practitioner: Bobby Da Silva MD  Date of Initial Visit: Type: THERAPY  Noted: 2021  Today's Date: 2021  Patient seen for 6 sessions    Recheck due: 2021  MD appt: Next week     *Fibromyalgia, hx of breast CA, Pacemaker*     Denisa Michelle reports: 50% improvement        Subjective Evaluation    History of Present Illness    Subjective comment: pt states that her back has been really sore, states that she picked up dog food and has been sore since. As far as the dizziness goes pt states that she is having no issues. States no falls recently.Pain  Current pain ratin           Objective          Static Posture     Comments  Max postural cues throughout treatment      See Exercise, Manual, and Modality Logs for complete treatment.       Assessment & Plan     Assessment  Assessment details: Pt feels that she is getting close to being done with PT and feels that she can conitnue at home. Pt is good with finishing up within the next 1-2 weeks. Pt having no symptoms of dizziness and no falls recently. Pt working on HEP at home. Pt did well with new CC resisted gait, SBA/CGA. Requires cues for posture throughout treatment.    Goals  Plan Goals: STG's by 2021  1) Demonstrate independence/compliance in performance of initial HEP-met  2) Demonstrate improved seated posture/pelvic neutral positioning with seated core/postural stabilization activities-progressing well  3) Demonstrate improved cervical ext AROM to 45 deg without dizziness  4) Perform static balance on airex with FT/EC for 30\" with no LOB - met  5) Demonstrate improved bilateral hip abd MMT to 4+/5    LTG's by 2021  1) Subjectively report 60% overall improvement or greater  2) Demonstrate improved bilateral shoulder flex/abd MMT to 5/5  3) Demonstrate improved bilateral hip MM to 5/5 in all planes  4) Demonstrate improved bilateral cervical " rotation to 65 deg or greater  5) Perform obstacle/balance course in hallway consisting of compliant surfaces, bench step, hurdles, and cone weave with no LOB, good control; SBA  6) Perform standing saccades and gaze stabilization activities on airex with no dizziness reported      Plan  Duration in visits: 12  Plan details: Recheck next. Obstacle course next visit.         Visit Diagnoses:    ICD-10-CM ICD-9-CM   1. Vertigo  R42 780.4   2. Balance problem  R26.89 781.99   3. Generalized muscle weakness  M62.81 728.87       Progress per Plan of Care and Progress strengthening /stabilization /functional activity           Timed:  Manual Therapy:         mins  63829;  Therapeutic Exercise:    30     mins  14938;     Neuromuscular Ena:    25    mins  73532;    Therapeutic Activity:          mins  99704;     Gait Training:           mins  03690;     Ultrasound:          mins  52169;    Electrical Stimulation:         mins  36706 ( );    Untimed:  Electrical Stimulation:         mins  78912 ( );  Mechanical Traction:         mins  19320;     Timed Treatment:   55   mins   Total Treatment:     55   mins  Kayli Gerard [facility]  Physical Therapist

## 2021-09-01 ENCOUNTER — APPOINTMENT (OUTPATIENT)
Dept: PHYSICAL THERAPY | Facility: HOSPITAL | Age: 61
End: 2021-09-01

## 2021-09-08 ENCOUNTER — HOSPITAL ENCOUNTER (OUTPATIENT)
Dept: PHYSICAL THERAPY | Facility: HOSPITAL | Age: 61
Setting detail: THERAPIES SERIES
Discharge: HOME | End: 2021-09-08

## 2021-09-08 DIAGNOSIS — M62.81 GENERALIZED MUSCLE WEAKNESS: ICD-10-CM

## 2021-09-08 DIAGNOSIS — R42 VERTIGO: Primary | ICD-10-CM

## 2021-09-08 DIAGNOSIS — R26.89 BALANCE PROBLEM: ICD-10-CM

## 2021-09-08 PROCEDURE — 97110 THERAPEUTIC EXERCISES: CPT | Performed by: PHYSICAL THERAPIST

## 2021-09-08 PROCEDURE — 97112 NEUROMUSCULAR REEDUCATION: CPT | Performed by: PHYSICAL THERAPIST

## 2021-09-08 NOTE — THERAPY DISCHARGE NOTE
Outpatient Physical Therapy Ortho Progress Note/Discharge Summary  [facility]     Patient Name: Denisa Michelle  : 1960  MRN: [id number]  Today's Date: 2021      Visit Date: 2021     Attendance:   Subjective improvement: 95%  MD appt: PRN  Recheck due: 2021    Visit Dx:    ICD-10-CM ICD-9-CM   1. Vertigo  R42 780.4   2. Balance problem  R26.89 781.99   3. Generalized muscle weakness  M62.81 728.87       Patient Active Problem List   Diagnosis   • Sinus node dysfunction (CMS/HCC)   • Chest pain in adult        Past Medical History:   Diagnosis Date   • Breast cancer (CMS/HCC)    • Rhabdomyolysis         Past Surgical History:   Procedure Laterality Date   • BREAST LUMPECTOMY     • CARDIAC CATHETERIZATION Left 2021    Procedure: Cardiac Catheterization/Vascular Study On CTA  at [facility] has nonobstructive 25-49% stenosis of the mid left anterior descending coronary artery;  Surgeon: Ranjith Stone MD;  Location:  [facility] CATH INVASIVE LOCATION;  Service: Cardiology;  Laterality: Left;   • CARDIAC ELECTROPHYSIOLOGY PROCEDURE N/A 2020    Procedure: Pacemaker DC new;  Surgeon: Ranjith Stone MD;  Location:  [facility] CATH INVASIVE LOCATION;  Service: Cardiology;  Laterality: N/A;   • CHOLECYSTECTOMY         PT Ortho     Row Name 21 1100       Precautions and Contraindications    Precautions/Limitations  other (see comments)  -KG    Precautions  Fibromyalgia, hx of breast CA, Pacemaker  -KG       Subjective Pain    Able to rate subjective pain?  yes  -KG    Pre-Treatment Pain Level  0  -KG       Posture/Observations    Posture/Observations Comments  Continues with decreased postural awarness. Cues not to slump when seated. Does better with standing awareness.  -KG       Special Tests/Palpation    Special Tests/Palpation  Cervical/Thoracic  -KG       General ROM    Head/Neck/Trunk  Neck Extension;Neck Flexion;Neck Lt Lateral Flexion;Neck Rt Lateral  Flexion;Neck Lt Rotation;Neck Rt Rotation  -KG    GENERAL ROM COMMENTS  Bilateral UE and bilateral hip, knee,ankle AROM WFL  -KG       Head/Neck/Trunk    Neck Extension AROM  40 deg  -KG    Neck Flexion AROM  50 deg  -KG    Neck Lt Lateral Flexion AROM  35 deg  -KG    Neck Rt Lateral Flexion AROM  35 deg  -KG    Neck Lt Rotation AROM  75 deg  -KG    Neck Rt Rotation AROM  65 deg  -KG    Head/Neck/Trunk Comments  No pain or dizziness with cervical mobility  -KG       MMT (Manual Muscle Testing)    Rt Upper Ext  Rt Shoulder Flexion;Rt Shoulder ABduction;Rt Shoulder Internal Rotation;Rt Shoulder External Rotation  -KG    Lt Upper Ext  Lt Shoulder Flexion;Lt Shoulder ABduction;Lt Shoulder Internal Rotation;Lt Shoulder External Rotation  -KG    Rt Lower Ext  Rt Hip Flexion;Rt Hip ABduction;Rt Hip ADduction;Rt Knee Extension;Rt Knee Flexion  -KG    Lt Lower Ext  Lt Hip Flexion;Lt Hip ABduction;Lt Hip ADduction;Lt Knee Extension;Lt Knee Flexion  -KG       MMT Right Upper Ext    Rt Shoulder Flexion MMT, Gross Movement  (5/5) normal  -KG    Rt Shoulder ABduction MMT, Gross Movement  (5/5) normal  -KG       MMT Left Upper Ext    Lt Shoulder Flexion MMT, Gross Movement  (5/5) normal  -KG    Lt Shoulder ABduction MMT, Gross Movement  (5/5) normal  -KG       MMT Right Lower Ext    Rt Hip Flexion MMT, Gross Movement  (5/5) normal  -KG    Rt Hip ABduction MMT, Gross Movement  (5/5) normal  -KG    Rt Hip ADduction MMT, Gross Movement  (5/5) normal  -KG    Rt Knee Extension MMT, Gross Movement  (5/5) normal  -KG    Rt Knee Flexion MMT, Gross Movement  (5/5) normal  -KG       MMT Left Lower Ext    Lt Hip Flexion MMT, Gross Movement  (5/5) normal  -KG    Lt Hip ABduction MMT, Gross Movement  (5/5) normal  -KG    Lt Hip ADduction MMT, Gross Movement  (5/5) normal  -KG    Lt Knee Extension MMT, Gross Movement  (5/5) normal  -KG    Lt Knee Flexion MMT, Gross Movement  (5/5) normal  -KG       Sensation    Sensation WNL?  WNL  -KG    Light  "Touch  No apparent deficits  -KG       Balance Skills Training    Balance Comments  FT/EC on airex: 30\" with sway. Airex beam tandem R/L lead ~20\" ea. No nystagmus or dizziness symptoms reported with seated vertical/horizontal head movements with gaze stabilization. No symptoms with 2 point eye movements horizontal/vertical. No dizziness throughout full treatment.  -KG       Gait/Stairs (Locomotion)    Comment (Gait/Stairs)  Ambulates with no AD, improved gait mechanics. Slightly fwd flexed at hips. Improved step height, equal step length  -KG      User Key  (r) = Recorded By, (t) = Taken By, (c) = Cosigned By    Initials Name Provider Type    KG Christiana Schwarz, PT Physical Therapist                      PT Assessment/Plan     Row Name 09/08/21 1100          PT Assessment    Assessment Comments  Pt has progressed very well overall with PT, meeting all therapy goals at this time. Pt reports 95% overall improvement. Pt has not had any dizziness/symptoms in several days. Pt did well with all treatment activities performed this date. Continues with poor postural awareness but is doing some better at self-correcting. Good improvements noted in cervical ROM with no dizziness or pain. Had no dizziness with saccades or gaze stabilization activities. Did review and update HEP. Per discussion with pt, pt discharged to independent management.  -KG     Please refer to paper survey for additional self-reported information  Yes  -KG     Rehab Potential  Good  -KG     Patient/caregiver participated in establishment of treatment plan and goals  --  -KG     Patient would benefit from skilled therapy intervention  No  -KG        PT Plan    Predicted Duration of Therapy Intervention (PT)  Discharged  -KG     PT Plan Comments  Pt discharged to independent management. Pt will follow up prn with PT with any questions or concerns  -KG       User Key  (r) = Recorded By, (t) = Taken By, (c) = Cosigned By    Initials Name Provider Type    " "KG Christiana Schwarz, PT Physical Therapist              OP Exercises     Row Name 09/08/21 1100             Precautions    Existing Precautions/Restrictions  other (see comments) Fibromyalgia, hx of breast CA, Pacemaker  -KG         Subjective Comments    Subjective Comments  Pt states she's doing really well and would like to be discharged today. Reports 95% improvement and is compliant with her HEP. Reports not having any dizziness with looking up or with any activities recently.  -KG         Subjective Pain    Able to rate subjective pain?  yes  -KG      Pre-Treatment Pain Level  0  -KG      Post-Treatment Pain Level  0  -KG         Exercise 1    Exercise Name 1  Pro II UE / LE conditioning and posture  -KG      Time 1  8 min  -KG      Additional Comments  L 4.0  -KG         Exercise 2    Exercise Name 2  Bilateral UT stretch  -KG      Reps 2  2  -KG      Time 2  30\" hold  -KG         Exercise 3    Exercise Name 3  Bilateral levator stretch  -KG      Reps 3  2  -KG      Time 3  30\" hold  -KG         Exercise 4    Exercise Name 4  Seated no moneys/posture  -KG      Sets 4  2  -KG      Reps 4  10  -KG      Additional Comments  Yellow tband  -KG         Exercise 5    Exercise Name 5  Standing tband mid rows  -KG      Sets 5  2  -KG      Reps 5  10  -KG      Additional Comments  Red tband  -KG         Exercise 6    Exercise Name 6  Standing anna marie shoulder ext/scap retraction  -KG      Sets 6  2  -KG      Reps 6  10  -KG      Additional Comments  Red tband  -KG         Exercise 7    Exercise Name 7  Cervical/BUE/BLE strength & ROM measurements  -KG         Exercise 8    Exercise Name 8  Balance/obstacle course  -KG      Sets 8  1  -KG      Reps 8  3 laps  -KG      Additional Comments  6\" bench step, 2 hurdles, unlevel mat, 2 hurdles, cone weave; SBA  -KG         Exercise 9    Exercise Name 9  Seated chin tuck review  -KG      Sets 9  1  -KG      Reps 9  10  -KG      Time 9  Pause  -KG         Exercise 10    Exercise " "Name 10  Airex Standing fixed gaze + horizontal head turns  -KG      Reps 10  1  -KG      Time 10  30\"  -KG         Exercise 11    Exercise Name 11  Airex Standing fixed gaze + vertical head turns  -KG      Reps 11  1  -KG      Time 11  30\"  -KG         Exercise 12    Exercise Name 12  Airex Standing 2 point vertical seccades  -KG      Reps 12  1  -KG      Time 12  30\"  -KG         Exercise 13    Exercise Name 13  Airex Standing 2 point horizontal seccades  -KG      Reps 13  1  -KG      Time 13  30\"  -KG         Exercise 14    Exercise Name 14  Airex FT EC  -KG      Reps 14  1  -KG      Time 14  30\"  -KG      Additional Comments  Sway but no Hand touch or LOB  -KG         Exercise 15    Exercise Name 15  Airex beam tandem R/L lead  -KG      Sets 15  1 ea  -KG      Time 15  30\"  -KG      Additional Comments  Intermittent hand touch at rail  -KG        User Key  (r) = Recorded By, (t) = Taken By, (c) = Cosigned By    Initials Name Provider Type    KG Christiana Schwarz, PT Physical Therapist                         PT OP Goals     Row Name 09/08/21 1100          PT Short Term Goals    STG 1  Demonstrate independence/compliance in performance of initial HEP  -KG     STG 1 Progress  Met  -KG     STG 2  Demonstrate improved seated posture/pelvic neutral positioning with seated core/postural stabilization activities  -KG     STG 2 Progress  Met  -KG     STG 3  Demonstrate improved cervical ext AROM to 45 deg without dizziness  -KG     STG 3 Progress  Partially Met  -KG     STG 3 Progress Comments  Ext AROM 40 deg with no symptoms  -KG     STG 4  Perform static balance on airex with FT/EC for 30\" with no LOB  -KG     STG 4 Progress  Met  -KG     STG 5  Demonstrate improved bilateral hip abd MMT to 4+/5  -KG     STG 5 Progress  Met  -KG        Long Term Goals    LTG Date to Achieve  09/29/21  -KG     LTG 1  Subjectively report 60% overall improvement or greater  -KG     LTG 1 Progress  Met  -KG     LTG 2  Demonstrate " improved bilateral shoulder flex/abd MMT to 5/5  -KG     LTG 2 Progress  Met  -KG     LTG 3  Demonstrate improved bilateral hip MM to 5/5 in all planes  -KG     LTG 3 Progress  Met  -KG     LTG 4  Demonstrate improved bilateral cervical rotation to 65 deg or greater  -KG     LTG 4 Progress  Met  -KG     LTG 5  Perform obstacle/balance course in hallway consisting of compliant surfaces, bench step, hurdles, and cone weave with no LOB, good control; SBA  -KG     LTG 5 Progress  Met  -KG     LTG 6  Perform standing saccades and gaze stabilization activities on airex with no dizziness reported  -KG     LTG 6 Progress  Met  -KG        Time Calculation    PT Goal Re-Cert Due Date  09/29/21  -KG       User Key  (r) = Recorded By, (t) = Taken By, (c) = Cosigned By    Initials Name Provider Type    Christiana Mccracken, PT Physical Therapist          Therapy Education  Education Details: HEP: given green & red tband; seated chin tucks  Given: HEP, Symptoms/condition management, Posture/body mechanics  Program: Reinforced, Progressed  How Provided: Verbal, Demonstration, Written  Provided to: Patient  Level of Understanding: Verbalized, Demonstrated    Outcome Measure Options: Dizziness Handicap Inventory  Dizziness Handicap Inventory  Dizziness Handicap Inventory: Minimal Perception of having a handicap  Dizziness Handicap Inventory Comments: 22 points      Time Calculation:   Start Time: 1104  Stop Time: 1142  Time Calculation (min): 38 min  Therapy Charges for Today     Code Description Service Date Service Provider Modifiers Qty    57714064838 HC PT NEUROMUSC RE EDUCATION EA 15 MIN 9/8/2021 Christiana Schwarz, PT GP 1    68384881060 HC PT THER PROC EA 15 MIN 9/8/2021 Christiana Schwarz, PT GP 2          PT G-Codes  Outcome Measure Options: Dizziness Handicap Inventory            Christiana Schwarz PT  9/8/2021

## 2022-04-12 RX ORDER — ATORVASTATIN CALCIUM 40 MG/1
40 TABLET, FILM COATED ORAL NIGHTLY
Qty: 90 TABLET | Refills: 3 | Status: SHIPPED | OUTPATIENT
Start: 2022-04-12

## 2022-04-12 NOTE — TELEPHONE ENCOUNTER
Rx Refill Note  Requested Prescriptions     Pending Prescriptions Disp Refills   • atorvastatin (LIPITOR) 40 MG tablet 90 tablet 3     Sig: Take 1 tablet by mouth Every Night.      Last office visit with prescribing clinician: Visit date not found      Next office visit with prescribing clinician: Visit date not found   Soraida Norris MA  04/12/22, 10:22 CDT

## 2022-04-28 ENCOUNTER — OFFICE VISIT (OUTPATIENT)
Dept: FAMILY MEDICINE CLINIC | Facility: CLINIC | Age: 62
End: 2022-04-28

## 2022-04-28 VITALS
TEMPERATURE: 98.6 F | SYSTOLIC BLOOD PRESSURE: 90 MMHG | OXYGEN SATURATION: 96 % | HEART RATE: 62 BPM | DIASTOLIC BLOOD PRESSURE: 60 MMHG | HEIGHT: 62 IN | BODY MASS INDEX: 30.18 KG/M2 | WEIGHT: 164 LBS

## 2022-04-28 DIAGNOSIS — M25.511 ACUTE PAIN OF BOTH SHOULDERS: ICD-10-CM

## 2022-04-28 DIAGNOSIS — G89.29 CHRONIC BILATERAL LOW BACK PAIN WITH RIGHT-SIDED SCIATICA: Primary | ICD-10-CM

## 2022-04-28 DIAGNOSIS — M25.512 ACUTE PAIN OF BOTH SHOULDERS: ICD-10-CM

## 2022-04-28 DIAGNOSIS — M54.41 CHRONIC BILATERAL LOW BACK PAIN WITH RIGHT-SIDED SCIATICA: Primary | ICD-10-CM

## 2022-04-28 PROCEDURE — 99213 OFFICE O/P EST LOW 20 MIN: CPT | Performed by: NURSE PRACTITIONER

## 2022-04-28 PROCEDURE — 96372 THER/PROPH/DIAG INJ SC/IM: CPT | Performed by: NURSE PRACTITIONER

## 2022-04-28 RX ORDER — TRIAMCINOLONE ACETONIDE 40 MG/ML
80 INJECTION, SUSPENSION INTRA-ARTICULAR; INTRAMUSCULAR ONCE
Status: COMPLETED | OUTPATIENT
Start: 2022-04-28 | End: 2022-04-28

## 2022-04-28 RX ORDER — ZOLPIDEM TARTRATE 12.5 MG/1
1 TABLET, FILM COATED, EXTENDED RELEASE ORAL DAILY
COMMUNITY
End: 2022-04-28 | Stop reason: SDUPTHER

## 2022-04-28 RX ORDER — PREGABALIN 150 MG/1
150 CAPSULE ORAL DAILY
COMMUNITY

## 2022-04-28 RX ORDER — ATORVASTATIN CALCIUM 40 MG/1
40 TABLET, FILM COATED ORAL DAILY
COMMUNITY
End: 2022-04-28 | Stop reason: SDUPTHER

## 2022-04-28 RX ORDER — SULFAMETHOXAZOLE AND TRIMETHOPRIM 400; 80 MG/1; MG/1
1 TABLET ORAL 2 TIMES DAILY
Qty: 10 TABLET | Refills: 2 | Status: SHIPPED | OUTPATIENT
Start: 2022-04-28 | End: 2022-06-08

## 2022-04-28 RX ORDER — OMEPRAZOLE 20 MG/1
20 CAPSULE, DELAYED RELEASE ORAL DAILY
COMMUNITY

## 2022-04-28 RX ADMIN — TRIAMCINOLONE ACETONIDE 80 MG: 40 INJECTION, SUSPENSION INTRA-ARTICULAR; INTRAMUSCULAR at 12:21

## 2022-04-28 NOTE — PROGRESS NOTES
"Chief Complaint   Patient presents with   • Leg Pain     Right leg   • Shoulder Pain     Shoulder blades        Subjective   Denisa Michelle is a 61 y.o. female who presents today for right leg pain and shoulder pain. Having low back pain.    HPI   Pain in the back on her right leg. Difficulty walking and taking dog for walk. Low back pain-thinks she might have a UTI. Between shoulders hurts. Gets out of breath easily. Polyuria and hesitancy. Some dyuria.    Allergies   Allergen Reactions   • Clindamycin/Lincomycin Hives and Rash         OBJECTIVE:  Vitals:    04/28/22 0804   BP: 90/60   BP Location: Right arm   Patient Position: Sitting   Cuff Size: Adult   Pulse: 62   Temp: 98.6 °F (37 °C)   SpO2: 96%   Weight: 74.4 kg (164 lb)   Height: 157.5 cm (62\")     Physical Exam  Constitutional:       Appearance: Normal appearance. She is normal weight.   Cardiovascular:      Rate and Rhythm: Normal rate and regular rhythm.      Pulses: Normal pulses.      Heart sounds: Normal heart sounds.   Pulmonary:      Effort: Pulmonary effort is normal.      Breath sounds: Normal breath sounds.   Musculoskeletal:      Right shoulder: Tenderness present.      Left shoulder: Tenderness present.      Lumbar back: Tenderness present.        Back:    Neurological:      Mental Status: She is alert.       Defers a urine specimen.  BMI is >= 30 and <= 34.9 (Class 1 obesity). The following options were offered after discussion: weight loss educational material (shared in after visit summary), exercise counseling/recommendations and nutrition counseling/recommendations        ASSESSMENT/ PLAN:    Diagnoses and all orders for this visit:    1. Chronic bilateral low back pain with right-sided sciatica (Primary)  -     triamcinolone acetonide (KENALOG-40) injection 80 mg  -     sulfamethoxazole-trimethoprim (Bactrim) 400-80 MG tablet; Take 1 tablet by mouth 2 (Two) Times a Day.  Dispense: 10 tablet; Refill: 2    2. Acute pain of both shoulders  -  "    triamcinolone acetonide (KENALOG-40) injection 80 mg      Will send in an antibiotic based on UTI symptoms.    Management Plan:     An After Visit Summary was printed and given to the patient at discharge.    Follow-up: Return if symptoms worsen or fail to improve.    I spent 20 minutes caring for Denisa on this date of service. This time includes time spent by me in the following activities: preparing for the visit, obtaining and/or reviewing a separately obtained history, performing a medically appropriate examination and/or evaluation, ordering medications, tests, or procedures and documenting information in the medical record     NEW Trivedi 4/28/2022 08:34 CDT  This note was electronically signed.           [id number] Detailed

## 2022-05-13 RX ORDER — LEVOTHYROXINE SODIUM 0.05 MG/1
50 TABLET ORAL DAILY
Qty: 90 TABLET | Refills: 3 | Status: SHIPPED | OUTPATIENT
Start: 2022-05-13

## 2022-05-13 NOTE — TELEPHONE ENCOUNTER
Rx Refill Note  Requested Prescriptions     Pending Prescriptions Disp Refills   • levothyroxine (SYNTHROID, LEVOTHROID) 50 MCG tablet 90 tablet 3     Sig: Take 1 tablet by mouth Daily.      Last office visit with prescribing clinician: 4/28/2022      Next office visit with prescribing clinician: Visit date not found   Soraida Norris MA  05/13/22, 10:39 CDT

## 2022-05-23 ENCOUNTER — TELEPHONE (OUTPATIENT)
Dept: FAMILY MEDICINE CLINIC | Facility: CLINIC | Age: 62
End: 2022-05-23

## 2022-05-23 NOTE — TELEPHONE ENCOUNTER
Caller: Denisa Michelle    Relationship to patient: Self    Best call back number:  [phone]    Patient is needing: Pt is asking for a copy of her most recent labs from Feb.; please call when ready for pickup

## 2022-06-08 ENCOUNTER — OFFICE VISIT (OUTPATIENT)
Dept: FAMILY MEDICINE CLINIC | Facility: CLINIC | Age: 62
End: 2022-06-08

## 2022-06-08 VITALS
DIASTOLIC BLOOD PRESSURE: 73 MMHG | TEMPERATURE: 98.1 F | BODY MASS INDEX: 31.73 KG/M2 | HEART RATE: 64 BPM | HEIGHT: 62 IN | SYSTOLIC BLOOD PRESSURE: 115 MMHG | OXYGEN SATURATION: 96 % | WEIGHT: 172.4 LBS

## 2022-06-08 DIAGNOSIS — H10.502 BLEPHAROCONJUNCTIVITIS OF LEFT EYE, UNSPECIFIED BLEPHAROCONJUNCTIVITIS TYPE: Primary | ICD-10-CM

## 2022-06-08 PROCEDURE — 99213 OFFICE O/P EST LOW 20 MIN: CPT | Performed by: NURSE PRACTITIONER

## 2022-06-08 RX ORDER — BUSPIRONE HYDROCHLORIDE 30 MG/1
30 TABLET ORAL 2 TIMES DAILY
COMMUNITY
Start: 2022-05-18

## 2022-06-08 RX ORDER — PREDNISOLONE ACETATE 10 MG/ML
2 SUSPENSION/ DROPS OPHTHALMIC 4 TIMES DAILY
Qty: 10 ML | Refills: 3 | Status: SHIPPED | OUTPATIENT
Start: 2022-06-08

## 2022-06-08 NOTE — PROGRESS NOTES
"Chief Complaint   Patient presents with   • Eye Pain     Left eye pain started early this morning. Having issues opening eye up this morning due to pain. Patient had some eye drops and used them this morning however there is still some pain.        Subjective   Denisa Michelle is a 61 y.o. female who presents today for left eye pain.     HPI   Woke up with left eye pain and had difficulty opening her eye. This is a recurring problem. Has been using pred forte with some relief.     Allergies   Allergen Reactions   • Clindamycin/Lincomycin Hives and Rash         OBJECTIVE:  Vitals:    06/08/22 1434   BP: 115/73   BP Location: Left arm   Patient Position: Sitting   Cuff Size: Large Adult   Pulse: 64   Temp: 98.1 °F (36.7 °C)   TempSrc: Infrared   SpO2: 96%   Weight: 78.2 kg (172 lb 6.4 oz)   Height: 157.5 cm (62.01\")     Physical Exam  Vitals and nursing note reviewed.   Constitutional:       Appearance: Normal appearance.   HENT:      Mouth/Throat:      Mouth: Mucous membranes are moist.   Eyes:      Conjunctiva/sclera:      Left eye: Left conjunctiva is injected.   Cardiovascular:      Rate and Rhythm: Normal rate and regular rhythm.      Pulses: Normal pulses.      Heart sounds: Normal heart sounds.   Pulmonary:      Effort: Pulmonary effort is normal.      Breath sounds: Normal breath sounds.   Skin:     General: Skin is warm and dry.   Neurological:      Mental Status: She is alert.   Psychiatric:         Mood and Affect: Mood normal.         Behavior: Behavior normal.         BMI is >= 30 and <35. (Class 1 Obesity). The following options were offered after discussion;: weight loss educational material (shared in after visit summary), exercise counseling/recommendations and nutrition counseling/recommendations        ASSESSMENT/ PLAN:    Diagnoses and all orders for this visit:    1. Blepharoconjunctivitis of left eye, unspecified blepharoconjunctivitis type (Primary)  -     prednisoLONE acetate (Pred Forte) 1 % " ophthalmic suspension; Administer 2 drops into the left eye 4 (Four) Times a Day.  Dispense: 10 mL; Refill: 3    Gave patient some eye patches and tape so she can patch her eye.       Management Plan:     An After Visit Summary was printed and given to the patient at discharge.    Follow-up: Return if symptoms worsen or fail to improve.    I spent 20 minutes caring for Denisa on this date of service. This time includes time spent by me in the following activities: preparing for the visit, obtaining and/or reviewing a separately obtained history, performing a medically appropriate examination and/or evaluation, ordering medications, tests, or procedures and documenting information in the medical record     Shelli Da Silva, NEW 6/8/2022 14:48 CDT  This note was electronically signed.

## 2022-07-13 ENCOUNTER — TELEPHONE (OUTPATIENT)
Dept: FAMILY MEDICINE CLINIC | Facility: CLINIC | Age: 62
End: 2022-07-13

## 2022-07-13 ENCOUNTER — OFFICE VISIT (OUTPATIENT)
Dept: FAMILY MEDICINE CLINIC | Facility: CLINIC | Age: 62
End: 2022-07-13

## 2022-07-13 VITALS
WEIGHT: 165.8 LBS | TEMPERATURE: 97.6 F | DIASTOLIC BLOOD PRESSURE: 74 MMHG | BODY MASS INDEX: 30.51 KG/M2 | HEART RATE: 87 BPM | OXYGEN SATURATION: 98 % | SYSTOLIC BLOOD PRESSURE: 127 MMHG | HEIGHT: 62 IN

## 2022-07-13 DIAGNOSIS — M25.551 PAIN OF BOTH HIP JOINTS: Primary | ICD-10-CM

## 2022-07-13 DIAGNOSIS — E55.9 VITAMIN D DEFICIENCY: ICD-10-CM

## 2022-07-13 DIAGNOSIS — M25.552 PAIN OF BOTH HIP JOINTS: Primary | ICD-10-CM

## 2022-07-13 DIAGNOSIS — E03.9 ACQUIRED HYPOTHYROIDISM: ICD-10-CM

## 2022-07-13 DIAGNOSIS — Z79.899 ENCOUNTER FOR LONG-TERM (CURRENT) USE OF OTHER MEDICATIONS: ICD-10-CM

## 2022-07-13 DIAGNOSIS — E78.2 MIXED HYPERLIPIDEMIA: ICD-10-CM

## 2022-07-13 PROCEDURE — 99214 OFFICE O/P EST MOD 30 MIN: CPT | Performed by: NURSE PRACTITIONER

## 2022-07-13 RX ORDER — CELECOXIB 200 MG/1
200 CAPSULE ORAL DAILY
Qty: 90 CAPSULE | Refills: 3 | Status: SHIPPED | OUTPATIENT
Start: 2022-07-13 | End: 2023-01-05 | Stop reason: SDUPTHER

## 2022-07-13 RX ORDER — THIAMINE HCL 100 MG
750 TABLET ORAL
COMMUNITY

## 2022-07-13 NOTE — PROGRESS NOTES
"Chief Complaint   Patient presents with   • Fibromyalgia     Patient has been experiencing joint/muscle pain. This is a chronic problem due to dx however the pain level has been higher than normal. Patient does see Dr. Cody for rheumatology.         Subjective   Denisa Michelle is a 61 y.o. female who presents today for fibromyalgia.    HPI   Bilateral hip pain. Takes her an hour to quit limping. Has been having back spasms also. All joints are stiff. Extreme fatigue. Time for routine labs.     Allergies   Allergen Reactions   • Clindamycin/Lincomycin Hives and Rash         OBJECTIVE:  Vitals:    07/13/22 0929   BP: 127/74   BP Location: Left arm   Patient Position: Sitting   Cuff Size: Large Adult   Pulse: 87   Temp: 97.6 °F (36.4 °C)   TempSrc: Infrared   SpO2: 98%   Weight: 75.2 kg (165 lb 12.8 oz)   Height: 157.5 cm (62.01\")     Physical Exam    BMI is >= 30 and <35. (Class 1 Obesity). The following options were offered after discussion;: weight loss educational material (shared in after visit summary), exercise counseling/recommendations and nutrition counseling/recommendations        ASSESSMENT/ PLAN:    Diagnoses and all orders for this visit:    1. Pain of both hip joints (Primary)  -     celecoxib (CeleBREX) 200 MG capsule; Take 1 capsule by mouth Daily.  Dispense: 90 capsule; Refill: 3    2. Encounter for long-term (current) use of other medications  -     CBC w AUTO Differential  -     Comprehensive metabolic panel  -     Hepatic Function Panel    3. Mixed hyperlipidemia  -     Lipid Panel With LDL / HDL Ratio    4. Acquired hypothyroidism  -     TSH  -     T4    5. Vitamin D deficiency  -     Vitamin D 25 hydroxy    Encouraged to see her rheumatologist. She plans on making an appointment.       Management Plan:     An After Visit Summary was printed and given to the patient at discharge.    Follow-up: Return in about 1 week (around 7/20/2022) for fu labs.    I spent 20 minutes caring for Denisa on this " date of service. This time includes time spent by me in the following activities: preparing for the visit, reviewing tests, obtaining and/or reviewing a separately obtained history, performing a medically appropriate examination and/or evaluation, ordering medications, tests, or procedures and documenting information in the medical record     Shelli Da Silva, NEW 7/13/2022 09:55 CDT  This note was electronically signed.

## 2022-07-13 NOTE — TELEPHONE ENCOUNTER
Caller: Denisa Michelle    Relationship: Self    Best call back number: [phone]    What is the medical concern/diagnosis: FIBROMYALGIA     What specialty or service is being requested: RHEUMATOLOGY     What is the provider, practice or medical service name: DR MARIA M LAGUERRE         What is the office phone number: FAX NUMBER  [phone]

## 2022-07-14 LAB
25(OH)D3+25(OH)D2 SERPL-MCNC: 94.6 NG/ML (ref 30–100)
ALBUMIN SERPL-MCNC: 3.8 G/DL (ref 3.8–4.8)
ALBUMIN/GLOB SERPL: 2.1 {RATIO} (ref 1.2–2.2)
ALP SERPL-CCNC: 61 IU/L (ref 44–121)
ALT SERPL-CCNC: 18 IU/L (ref 0–32)
AST SERPL-CCNC: 14 IU/L (ref 0–40)
BASOPHILS # BLD AUTO: 0 X10E3/UL (ref 0–0.2)
BASOPHILS NFR BLD AUTO: 1 %
BILIRUB DIRECT SERPL-MCNC: 0.2 MG/DL (ref 0–0.4)
BILIRUB SERPL-MCNC: 0.6 MG/DL (ref 0–1.2)
BUN SERPL-MCNC: 9 MG/DL (ref 8–27)
BUN/CREAT SERPL: 9 (ref 12–28)
CALCIUM SERPL-MCNC: 9.1 MG/DL (ref 8.7–10.3)
CHLORIDE SERPL-SCNC: 103 MMOL/L (ref 96–106)
CO2 SERPL-SCNC: 23 MMOL/L (ref 20–29)
CREAT SERPL-MCNC: 1.03 MG/DL (ref 0.57–1)
EGFRCR SERPLBLD CKD-EPI 2021: 61 ML/MIN/1.73
EOSINOPHIL # BLD AUTO: 0 X10E3/UL (ref 0–0.4)
EOSINOPHIL NFR BLD AUTO: 1 %
ERYTHROCYTE [DISTWIDTH] IN BLOOD BY AUTOMATED COUNT: 13.5 % (ref 11.7–15.4)
GLOBULIN SER CALC-MCNC: 1.8 G/DL (ref 1.5–4.5)
GLUCOSE SERPL-MCNC: 93 MG/DL (ref 65–99)
HCT VFR BLD AUTO: 40.1 % (ref 34–46.6)
HGB BLD-MCNC: 13.4 G/DL (ref 11.1–15.9)
IMM GRANULOCYTES # BLD AUTO: 0 X10E3/UL (ref 0–0.1)
IMM GRANULOCYTES NFR BLD AUTO: 0 %
LYMPHOCYTES # BLD AUTO: 1.8 X10E3/UL (ref 0.7–3.1)
LYMPHOCYTES NFR BLD AUTO: 31 %
MCH RBC QN AUTO: 31.5 PG (ref 26.6–33)
MCHC RBC AUTO-ENTMCNC: 33.4 G/DL (ref 31.5–35.7)
MCV RBC AUTO: 94 FL (ref 79–97)
MONOCYTES # BLD AUTO: 0.5 X10E3/UL (ref 0.1–0.9)
MONOCYTES NFR BLD AUTO: 9 %
NEUTROPHILS # BLD AUTO: 3.4 X10E3/UL (ref 1.4–7)
NEUTROPHILS NFR BLD AUTO: 58 %
PLATELET # BLD AUTO: 272 X10E3/UL (ref 150–450)
POTASSIUM SERPL-SCNC: 4.2 MMOL/L (ref 3.5–5.2)
PROT SERPL-MCNC: 5.6 G/DL (ref 6–8.5)
RBC # BLD AUTO: 4.26 X10E6/UL (ref 3.77–5.28)
SODIUM SERPL-SCNC: 137 MMOL/L (ref 134–144)
T4 SERPL-MCNC: 9.8 UG/DL (ref 4.5–12)
TSH SERPL DL<=0.005 MIU/L-ACNC: 1.65 UIU/ML (ref 0.45–4.5)
WBC # BLD AUTO: 5.8 X10E3/UL (ref 3.4–10.8)

## 2022-07-20 ENCOUNTER — OFFICE VISIT (OUTPATIENT)
Dept: FAMILY MEDICINE CLINIC | Facility: CLINIC | Age: 62
End: 2022-07-20

## 2022-07-20 VITALS
WEIGHT: 170 LBS | SYSTOLIC BLOOD PRESSURE: 110 MMHG | TEMPERATURE: 97.8 F | HEIGHT: 63 IN | BODY MASS INDEX: 30.12 KG/M2 | DIASTOLIC BLOOD PRESSURE: 75 MMHG | OXYGEN SATURATION: 96 % | HEART RATE: 71 BPM

## 2022-07-20 DIAGNOSIS — M25.551 BILATERAL HIP PAIN: ICD-10-CM

## 2022-07-20 DIAGNOSIS — E03.9 ACQUIRED HYPOTHYROIDISM: ICD-10-CM

## 2022-07-20 DIAGNOSIS — F41.9 ANXIETY: Primary | ICD-10-CM

## 2022-07-20 DIAGNOSIS — E66.09 CLASS 1 OBESITY DUE TO EXCESS CALORIES WITHOUT SERIOUS COMORBIDITY WITH BODY MASS INDEX (BMI) OF 30.0 TO 30.9 IN ADULT: ICD-10-CM

## 2022-07-20 DIAGNOSIS — F33.1 MAJOR DEPRESSIVE DISORDER, RECURRENT, MODERATE: ICD-10-CM

## 2022-07-20 DIAGNOSIS — E78.2 MIXED HYPERLIPIDEMIA: ICD-10-CM

## 2022-07-20 DIAGNOSIS — K21.9 GERD WITHOUT ESOPHAGITIS: ICD-10-CM

## 2022-07-20 DIAGNOSIS — M79.651 RIGHT THIGH PAIN: ICD-10-CM

## 2022-07-20 DIAGNOSIS — F51.01 PRIMARY INSOMNIA: ICD-10-CM

## 2022-07-20 DIAGNOSIS — M25.552 BILATERAL HIP PAIN: ICD-10-CM

## 2022-07-20 PROCEDURE — 99215 OFFICE O/P EST HI 40 MIN: CPT | Performed by: NURSE PRACTITIONER

## 2022-07-20 PROCEDURE — 96372 THER/PROPH/DIAG INJ SC/IM: CPT | Performed by: NURSE PRACTITIONER

## 2022-07-20 RX ORDER — TRIAMCINOLONE ACETONIDE 40 MG/ML
40 INJECTION, SUSPENSION INTRA-ARTICULAR; INTRAMUSCULAR ONCE
Status: COMPLETED | OUTPATIENT
Start: 2022-07-20 | End: 2022-07-20

## 2022-07-20 RX ORDER — HYDROCODONE BITARTRATE AND ACETAMINOPHEN 7.5; 325 MG/1; MG/1
1 TABLET ORAL EVERY 4 HOURS PRN
Qty: 12 TABLET | Refills: 0 | Status: SHIPPED | OUTPATIENT
Start: 2022-07-20 | End: 2022-08-25

## 2022-07-20 RX ADMIN — TRIAMCINOLONE ACETONIDE 40 MG: 40 INJECTION, SUSPENSION INTRA-ARTICULAR; INTRAMUSCULAR at 10:10

## 2022-07-20 NOTE — PROGRESS NOTES
"Chief Complaint   Patient presents with   • Hip Pain     1 week follow up, hips are better   • Leg Pain     Right thigh pain         Subjective   Denisa Michelle is a 62 y.o. female who presents today for right thigh pain. Bilateral hips are better. Also here to follow up on labs. Lipid panel was not done so will have to be redrawn.     HPI   Right thigh pain is a 9 on a scale from 0-10. It is hard for her to get up on the toilet in the mornings because of the pain. Hip pain is still present but better even in the mornings.     Allergies   Allergen Reactions   • Clindamycin/Lincomycin Hives and Rash         OBJECTIVE:  Vitals:    07/20/22 0935   BP: 110/75   BP Location: Left arm   Patient Position: Sitting   Cuff Size: Large Adult   Pulse: 71   Temp: 97.8 °F (36.6 °C)   SpO2: 96%   Weight: 77.1 kg (170 lb)   Height: 158.8 cm (62.5\")     Physical Exam  Vitals and nursing note reviewed.   Constitutional:       Appearance: Normal appearance.   HENT:      Head: Normocephalic and atraumatic.      Nose: Nose normal.      Mouth/Throat:      Mouth: Mucous membranes are dry.   Eyes:      Extraocular Movements: Extraocular movements intact.      Pupils: Pupils are equal, round, and reactive to light.   Cardiovascular:      Rate and Rhythm: Normal rate and regular rhythm.      Pulses: Normal pulses.      Heart sounds: Normal heart sounds.   Pulmonary:      Effort: Pulmonary effort is normal.      Breath sounds: Normal breath sounds.   Abdominal:      General: Abdomen is flat. Bowel sounds are normal.      Palpations: Abdomen is soft.   Musculoskeletal:      Cervical back: Normal range of motion and neck supple.      Right hip: Tenderness and crepitus present. Decreased range of motion. Decreased strength.      Left hip: Tenderness and crepitus present. Decreased range of motion. Decreased strength.      Right upper leg: Tenderness present.   Skin:     General: Skin is warm.      Capillary Refill: Capillary refill takes less " than 2 seconds.   Neurological:      General: No focal deficit present.      Mental Status: She is alert and oriented to person, place, and time.   Psychiatric:         Mood and Affect: Mood normal.         Behavior: Behavior normal.         Thought Content: Thought content normal.         Judgment: Judgment normal.         BMI is >= 30 and <35. (Class 1 Obesity). The following options were offered after discussion;: weight loss educational material (shared in after visit summary), exercise counseling/recommendations and nutrition counseling/recommendations    CBC-WNL  CMP-WNL  TSH-1.650  T4-9.8  Vit D-94.6  Labs reviewed with patient who verbalizes understanding.       ASSESSMENT/ PLAN:    Diagnoses and all orders for this visit:    1. Anxiety (Primary)    2. Mixed hyperlipidemia  -     Cancel: Lipid Panel With LDL / HDL Ratio  -     Lipid Panel With LDL / HDL Ratio    3. Major depressive disorder, recurrent, moderate (HCC)    4. GERD without esophagitis    5. Primary insomnia    6. Acquired hypothyroidism    7. Right thigh pain  -     triamcinolone acetonide (KENALOG-40) injection 40 mg  -     HYDROcodone-acetaminophen (Norco) 7.5-325 MG per tablet; Take 1 tablet by mouth Every 4 (Four) Hours As Needed for Moderate Pain .  Dispense: 12 tablet; Refill: 0    8. Bilateral hip pain  -     triamcinolone acetonide (KENALOG-40) injection 40 mg  -     HYDROcodone-acetaminophen (Norco) 7.5-325 MG per tablet; Take 1 tablet by mouth Every 4 (Four) Hours As Needed for Moderate Pain .  Dispense: 12 tablet; Refill: 0    9. Class 1 obesity due to excess calories without serious comorbidity with body mass index (BMI) of 30.0 to 30.9 in adult          Management Plan:     An After Visit Summary was printed and given to the patient at discharge.    Follow-up: Return if symptoms worsen or fail to improve.    I spent 40 minutes caring for Denisa on this date of service. This time includes time spent by me in the following activities:  preparing for the visit, reviewing tests, obtaining and/or reviewing a separately obtained history, performing a medically appropriate examination and/or evaluation, ordering medications, tests, or procedures and documenting information in the medical record     NEW Trivedi 8/5/2022 10:29 CDT  This note was electronically signed.

## 2022-07-21 LAB
CHOLEST SERPL-MCNC: 126 MG/DL (ref 100–199)
HDLC SERPL-MCNC: 50 MG/DL
LDLC SERPL CALC-MCNC: 57 MG/DL (ref 0–99)
LDLC/HDLC SERPL: 1.1 RATIO (ref 0–3.2)
TRIGL SERPL-MCNC: 100 MG/DL (ref 0–149)
VLDLC SERPL CALC-MCNC: 19 MG/DL (ref 5–40)

## 2022-07-28 ENCOUNTER — OFFICE VISIT (OUTPATIENT)
Dept: FAMILY MEDICINE CLINIC | Facility: CLINIC | Age: 62
End: 2022-07-28

## 2022-07-28 VITALS
SYSTOLIC BLOOD PRESSURE: 106 MMHG | WEIGHT: 170 LBS | HEIGHT: 63 IN | OXYGEN SATURATION: 96 % | TEMPERATURE: 97.6 F | DIASTOLIC BLOOD PRESSURE: 73 MMHG | BODY MASS INDEX: 30.12 KG/M2 | HEART RATE: 94 BPM

## 2022-07-28 DIAGNOSIS — E78.2 MIXED HYPERLIPIDEMIA: ICD-10-CM

## 2022-07-28 DIAGNOSIS — M25.551 PAIN OF BOTH HIP JOINTS: ICD-10-CM

## 2022-07-28 DIAGNOSIS — R39.15 URINARY URGENCY: Primary | ICD-10-CM

## 2022-07-28 DIAGNOSIS — E66.09 CLASS 1 OBESITY DUE TO EXCESS CALORIES WITHOUT SERIOUS COMORBIDITY WITH BODY MASS INDEX (BMI) OF 30.0 TO 30.9 IN ADULT: ICD-10-CM

## 2022-07-28 DIAGNOSIS — M25.552 PAIN OF BOTH HIP JOINTS: ICD-10-CM

## 2022-07-28 DIAGNOSIS — R30.0 DYSURIA: ICD-10-CM

## 2022-07-28 DIAGNOSIS — J30.2 SEASONAL ALLERGIES: ICD-10-CM

## 2022-07-28 PROBLEM — E66.811 CLASS 1 OBESITY DUE TO EXCESS CALORIES WITHOUT SERIOUS COMORBIDITY WITH BODY MASS INDEX (BMI) OF 30.0 TO 30.9 IN ADULT: Status: ACTIVE | Noted: 2022-07-28

## 2022-07-28 PROCEDURE — 99213 OFFICE O/P EST LOW 20 MIN: CPT | Performed by: NURSE PRACTITIONER

## 2022-07-28 RX ORDER — LORATADINE AND PSEUDOEPHEDRINE SULFATE 5; 120 MG/1; MG/1
1 TABLET, EXTENDED RELEASE ORAL 2 TIMES DAILY
Qty: 20 TABLET | Refills: 6 | Status: SHIPPED | OUTPATIENT
Start: 2022-07-28 | End: 2023-01-13

## 2022-07-28 RX ORDER — SULFAMETHOXAZOLE AND TRIMETHOPRIM 400; 80 MG/1; MG/1
1 TABLET ORAL 2 TIMES DAILY
Qty: 20 TABLET | Refills: 2 | Status: SHIPPED | OUTPATIENT
Start: 2022-07-28 | End: 2022-08-25

## 2022-07-28 NOTE — PROGRESS NOTES
"Chief Complaint   Patient presents with   • Joint Pain     Follow up, multiple areas   • Urinary Tract Infection     Urinary urgency         Subjective   Denisa Michelle is a 62 y.o. female who presents today for follow up lab, UTI, and joint pain.    HPI   Patient still with joint pain but cannot see a rheumatologist until January.   Having burning on urination x 2 days and incontinent at night. Unable to urinate. Will treat symptomatically.   Patient is needing a refill on claritin D.     Allergies   Allergen Reactions   • Clindamycin/Lincomycin Hives and Rash         OBJECTIVE:  Vitals:    07/28/22 0930   BP: 106/73   BP Location: Left arm   Patient Position: Sitting   Cuff Size: Large Adult   Pulse: 94   Temp: 97.6 °F (36.4 °C)   SpO2: 96%   Weight: 77.1 kg (170 lb)   Height: 158.8 cm (62.5\")     Physical Exam  Vitals and nursing note reviewed.   Constitutional:       Appearance: Normal appearance.   HENT:      Mouth/Throat:      Mouth: Mucous membranes are moist.   Cardiovascular:      Rate and Rhythm: Normal rate and regular rhythm.      Pulses: Normal pulses.      Heart sounds: Normal heart sounds.   Pulmonary:      Effort: Pulmonary effort is normal.      Breath sounds: Normal breath sounds.   Abdominal:      General: Abdomen is flat. Bowel sounds are normal.      Palpations: Abdomen is soft.   Skin:     General: Skin is warm and dry.      Capillary Refill: Capillary refill takes less than 2 seconds.   Neurological:      General: No focal deficit present.      Mental Status: She is alert and oriented to person, place, and time.   Psychiatric:         Mood and Affect: Mood normal.         Behavior: Behavior normal.         Thought Content: Thought content normal.         Judgment: Judgment normal.       Trig-100  HDL-50  LDL-57  Labs reviewed with patient who voices understanding.    BMI is >= 30 and <35. (Class 1 Obesity). The following options were offered after discussion;: weight loss educational material " (shared in after visit summary), exercise counseling/recommendations and nutrition counseling/recommendations        ASSESSMENT/ PLAN:    Diagnoses and all orders for this visit:    1. Urinary urgency (Primary)  -     Cancel: POCT urinalysis dipstick, multipro    2. Seasonal allergies  -     loratadine-pseudoephedrine (Claritin-D 12 Hour) 5-120 MG per 12 hr tablet; Take 1 tablet by mouth 2 (Two) Times a Day.  Dispense: 20 tablet; Refill: 6    3. Dysuria  -     sulfamethoxazole-trimethoprim (Bactrim) 400-80 MG tablet; Take 1 tablet by mouth 2 (Two) Times a Day.  Dispense: 20 tablet; Refill: 2    4. Mixed hyperlipidemia    5. Pain of both hip joints    6. Class 1 obesity due to excess calories without serious comorbidity with body mass index (BMI) of 30.0 to 30.9 in adult    continue present medication  Keep appointment with rheumatologist      Management Plan:     An After Visit Summary was printed and given to the patient at discharge.    Follow-up: Return if symptoms worsen or fail to improve.    I spent 20 minutes caring for Denisa on this date of service. This time includes time spent by me in the following activities: preparing for the visit, reviewing tests, obtaining and/or reviewing a separately obtained history, performing a medically appropriate examination and/or evaluation, ordering medications, tests, or procedures and documenting information in the medical record     NEW Trivedi 7/28/2022 10:03 CDT  This note was electronically signed.

## 2022-08-25 ENCOUNTER — OFFICE VISIT (OUTPATIENT)
Dept: FAMILY MEDICINE CLINIC | Facility: CLINIC | Age: 62
End: 2022-08-25

## 2022-08-25 VITALS
WEIGHT: 164.2 LBS | DIASTOLIC BLOOD PRESSURE: 64 MMHG | HEIGHT: 63 IN | TEMPERATURE: 97.1 F | HEART RATE: 98 BPM | BODY MASS INDEX: 29.09 KG/M2 | SYSTOLIC BLOOD PRESSURE: 93 MMHG | OXYGEN SATURATION: 95 %

## 2022-08-25 DIAGNOSIS — E66.3 OVERWEIGHT (BMI 25.0-29.9): ICD-10-CM

## 2022-08-25 DIAGNOSIS — R07.81 RIB PAIN ON LEFT SIDE: Primary | ICD-10-CM

## 2022-08-25 DIAGNOSIS — M43.6 NECK RIGIDITY: ICD-10-CM

## 2022-08-25 PROCEDURE — 99214 OFFICE O/P EST MOD 30 MIN: CPT | Performed by: NURSE PRACTITIONER

## 2022-08-25 RX ORDER — HYDROCODONE BITARTRATE AND ACETAMINOPHEN 7.5; 325 MG/1; MG/1
1 TABLET ORAL EVERY 4 HOURS PRN
Qty: 24 TABLET | Refills: 0 | Status: SHIPPED | OUTPATIENT
Start: 2022-08-25 | End: 2022-09-19

## 2022-08-25 NOTE — PROGRESS NOTES
"Chief Complaint   Patient presents with   • pain in neck     Neck pain been going on for 2 to 3 weeks off and on.  Patient states she feel because of her pet.states she hurt knees and left upper side.        Subjective   Denisa Michelle is a 62 y.o. female who presents today for a fall she had Tuesday. She tripped over her dog that was after a cat. She is hurting in her left upper ribs.     HPI   She fell Tuesday walking her dog. He went after a cat and pulled her down on her left side. She is having difficulty with movement on that side, including taking deep breaths and she is in severe pain. She appears in pain. Moving slowly to change positions and walk.  She is having difficulty holding her head up in the am x 2 weeks.. She states her neck is stiff and when she tried to take her dog for a walk today she was unable to lift her head and almost couldn't make it home. This problem resolves later in the day. She was having some difficulty today holding her head up while sitting in the exam chair. She is an RN.  Her mother had progressive supranuclear palsy. She states she is exhibiting many of the same symptoms now. She appears ill and her speech is somewhat slow and slurred.    Allergies   Allergen Reactions   • Clindamycin/Lincomycin Hives and Rash         OBJECTIVE:  Vitals:    08/25/22 1452   BP: 93/64   BP Location: Right arm   Patient Position: Sitting   Cuff Size: Adult   Pulse: 98   Temp: 97.1 °F (36.2 °C)   SpO2: 95%   Weight: 74.5 kg (164 lb 3.2 oz)   Height: 158.8 cm (62.5\")     Physical Exam  Vitals and nursing note reviewed.   Constitutional:       Appearance: Normal appearance.   Neck:      Comments: Unable to move head backwards to look at the ceiling. Limited ROM.   Cardiovascular:      Rate and Rhythm: Normal rate and regular rhythm.      Pulses: Normal pulses.      Heart sounds: Normal heart sounds.   Neurological:      Mental Status: She is alert.     Unable to take deep breaths.    BMI is >= 25 and " <30. (Overweight) The following options were offered after discussion;: exercise counseling/recommendations and nutrition counseling/recommendations        ASSESSMENT/ PLAN:    Diagnoses and all orders for this visit:    1. Rib pain on left side (Primary)  -     XR Ribs Left With PA Chest; Future  -     HYDROcodone-acetaminophen (Norco) 7.5-325 MG per tablet; Take 1 tablet by mouth Every 4 (Four) Hours As Needed for Moderate Pain  or Severe Pain .  Dispense: 24 tablet; Refill: 0    2. Neck rigidity  -     Ambulatory Referral to Neurology    3. Overweight (BMI 25.0-29.9)    Continue taking muscle relaxants prescribed by Dr. Hernandez.       Management Plan:     An After Visit Summary was printed and given to the patient at discharge.    Follow-up: Return if symptoms worsen or fail to improve.    I spent 25 minutes caring for Denisa on this date of service. This time includes time spent by me in the following activities: preparing for the visit, obtaining and/or reviewing a separately obtained history, performing a medically appropriate examination and/or evaluation, ordering medications, tests, or procedures, referring and communicating with other health care professionals and documenting information in the medical record     NEW Trivedi 8/25/2022 16:12 CDT  This note was electronically signed.

## 2022-08-26 DIAGNOSIS — R07.81 RIB PAIN ON LEFT SIDE: ICD-10-CM

## 2022-09-19 ENCOUNTER — OFFICE VISIT (OUTPATIENT)
Dept: NEUROLOGY | Facility: CLINIC | Age: 62
End: 2022-09-19

## 2022-09-19 ENCOUNTER — LAB (OUTPATIENT)
Dept: LAB | Facility: HOSPITAL | Age: 62
End: 2022-09-19

## 2022-09-19 VITALS
WEIGHT: 161.2 LBS | HEIGHT: 63 IN | DIASTOLIC BLOOD PRESSURE: 70 MMHG | BODY MASS INDEX: 28.56 KG/M2 | HEART RATE: 63 BPM | OXYGEN SATURATION: 98 % | SYSTOLIC BLOOD PRESSURE: 112 MMHG

## 2022-09-19 DIAGNOSIS — M53.82 NECK MUSCLE WEAKNESS: ICD-10-CM

## 2022-09-19 DIAGNOSIS — R29.6 FALLS FREQUENTLY: ICD-10-CM

## 2022-09-19 DIAGNOSIS — M53.82 NECK MUSCLE WEAKNESS: Primary | ICD-10-CM

## 2022-09-19 PROCEDURE — 99204 OFFICE O/P NEW MOD 45 MIN: CPT | Performed by: PSYCHIATRY & NEUROLOGY

## 2022-09-19 PROCEDURE — 83519 RIA NONANTIBODY: CPT

## 2022-09-19 PROCEDURE — 36415 COLL VENOUS BLD VENIPUNCTURE: CPT

## 2022-09-19 NOTE — PROGRESS NOTES
Subjective        Denisa Michelle presents to [facility]    History of Present Illness  62-year-old female presents for evaluation of neck weakness.  She states she had difficulty holding her head up for 2 to 3 months.  She was started on Celebrex recently and this has helped a lot.  She does fall quite a bit.  Eb since 2018.  Her last fall was in August but this was because her dog pulled her down.  She notes the symptoms first thing in the morning when out walking her dog.  This would ease up a bit but then worsen again during the evening long.  She has to walk with her head drop down.  She does have some pain but does not feel like this is what is causing the difficulty.  She is very fatigued.  She is incontinent for urine for 6 months.  Sometimes she knows but also sometimes she has just woken up having urinated on herself.  She denies any trouble swallowing, dysarthria, ptosis, diplopia.        Past Medical History:   Diagnosis Date   • Breast cancer (HCC) 2006   • Coronary artery disease 10/20/2019    Had SSS   • Fibromyalgia, primary    • Rhabdomyolysis           Current Outpatient Medications:   •  ALPRAZolam (XANAX) 1 MG tablet, Take 1 mg by mouth 2 (two) times a day. Patient takes 2 tablets in the morning and two tablets at bedtime, Disp: , Rfl:   •  atorvastatin (LIPITOR) 40 MG tablet, Take 1 tablet by mouth Every Night., Disp: 90 tablet, Rfl: 3  •  busPIRone (BUSPAR) 30 MG tablet, Take 30 mg by mouth 2 (Two) Times a Day. Patient takes 45 mg BID, Disp: , Rfl:   •  celecoxib (CeleBREX) 200 MG capsule, Take 1 capsule by mouth Daily., Disp: 90 capsule, Rfl: 3  •  HYDROcodone-acetaminophen (Norco) 7.5-325 MG per tablet, Take 1 tablet by mouth Every 4 (Four) Hours As Needed for Moderate Pain  or Severe Pain ., Disp: 24 tablet, Rfl: 0  •  levothyroxine (SYNTHROID, LEVOTHROID) 50 MCG tablet, Take 1 tablet by mouth Daily., Disp: 90 tablet, Rfl: 3  •  loratadine-pseudoephedrine  "(Claritin-D 12 Hour) 5-120 MG per 12 hr tablet, Take 1 tablet by mouth 2 (Two) Times a Day., Disp: 20 tablet, Rfl: 6  •  Magnesium 500 MG tablet, Take 750 mg by mouth every night at bedtime., Disp: , Rfl:   •  melatonin 5 MG tablet tablet, Take 40 mg by mouth Every Night., Disp: , Rfl:   •  nitroglycerin (NITROSTAT) 0.4 MG SL tablet, Place 0.4 mg under the tongue Every 5 (Five) Minutes As Needed for Chest Pain. Take no more than 3 doses in 15 minutes., Disp: , Rfl:   •  omeprazole (priLOSEC) 20 MG capsule, Take 20 mg by mouth Daily., Disp: , Rfl:   •  prednisoLONE acetate (Pred Forte) 1 % ophthalmic suspension, Administer 2 drops into the left eye 4 (Four) Times a Day. (Patient taking differently: Administer 2 drops into the left eye As Needed.), Disp: 10 mL, Rfl: 3  •  pregabalin (LYRICA) 150 MG capsule, Take 150 mg by mouth Daily., Disp: , Rfl:   •  traZODone (DESYREL) 50 MG tablet, Take 100 mg by mouth Every Night., Disp: , Rfl:        Objective   Vital Signs:   Ht 158.8 cm (62.5\")   BMI 29.55 kg/m²     Physical Exam  Constitutional:       General: She is awake.   Eyes:      Extraocular Movements: Extraocular movements intact.      Pupils: Pupils are equal, round, and reactive to light.   Neurological:      Mental Status: She is alert.      Deep Tendon Reflexes: Strength normal and reflexes are normal and symmetric.   Psychiatric:         Speech: Speech normal.        Neurological Exam  Mental Status  Awake and alert. Oriented to person, place and time. Recent and remote memory are intact. Speech is normal. Language is fluent with no aphasia. Attention and concentration are normal. Fund of knowledge is appropriate for level of education.    Cranial Nerves  CN II: Visual fields full to confrontation.  CN III, IV, VI: Extraocular movements intact bilaterally. Pupils equal round and reactive to light bilaterally.  CN V: Facial sensation is normal.  CN VII: Full and symmetric facial movement.  CN IX, X: Palate " elevates symmetrically  CN XI: Shoulder shrug strength is normal.  CN XII: Tongue midline without atrophy or fasciculations.    Motor  Normal muscle bulk throughout. Normal muscle tone. No abnormal involuntary movements. Strength is 5/5 throughout all four extremities.    Sensory  Light touch is normal in upper and lower extremities.     Reflexes  Deep tendon reflexes are 2+ and symmetric in all four extremities.    Coordination  Right: Finger-to-nose normal.Left: Finger-to-nose normal.    Gait  Casual gait is normal including stance, stride, and arm swing.      Result Review :                     Assessment and Plan   62-year-old female with several months of difficulty holding her head up.  She does not have any weakness on exam today.  She has no other clear associated features.  Could be a movement disorder, neck extensor myopathy, myasthenia gravis.  However there were no features of these things on exam today.    Plan:    1.  Myasthenia panel.  2.  MRI brain.  3.  If above is unrevealing consider EMG.  4.  Follow-up 8 weeks.        Follow Up   No follow-ups on file.  Patient was given instructions and counseling regarding her condition or for health maintenance advice. Please see specific information pulled into the AVS if appropriate.

## 2022-09-20 ENCOUNTER — PATIENT ROUNDING (BHMG ONLY) (OUTPATIENT)
Dept: NEUROLOGY | Facility: CLINIC | Age: 62
End: 2022-09-20

## 2022-09-20 NOTE — PROGRESS NOTES
September 20, 2022    Hello, may I speak with Denisa Michelle?    My name is Juvenal Sears     I am  with [facility]  [address]  [address]  [facility] [place] [address] [phone].    Before we get started may I verify your date of birth? 1960    I am calling to officially welcome you to our practice and ask about your recent visit. Is this a good time to talk? YES    Tell me about your visit with us. What things went well? She does feel like Dr. Chavez was interested in what she had to say.    We're always looking for ways to make our patients' experiences even better. Do you have recommendations on ways we may improve?  No    Overall were you satisfied with your first visit to our practice? Yes     I appreciate you taking the time to speak with me today. Is there anything else I can do for you? No      Thank you, and have a great day.

## 2022-09-28 LAB — MUSK AB SER IA-ACNC: <1 U/ML

## 2022-10-03 LAB
ACHR BIND AB SER-SCNC: <0.03 NMOL/L (ref 0–0.24)
ACHR BLOCK AB SER-ACNC: 12 % (ref 0–25)
ACHR MOD AB SER QL FC: 6 % (ref 0–45)

## 2022-10-04 ENCOUNTER — OFFICE VISIT (OUTPATIENT)
Dept: FAMILY MEDICINE CLINIC | Facility: CLINIC | Age: 62
End: 2022-10-04

## 2022-10-04 VITALS
TEMPERATURE: 96.7 F | OXYGEN SATURATION: 95 % | HEART RATE: 124 BPM | WEIGHT: 159.2 LBS | BODY MASS INDEX: 28.21 KG/M2 | HEIGHT: 63 IN

## 2022-10-04 DIAGNOSIS — M25.552 ACUTE HIP PAIN, LEFT: Primary | ICD-10-CM

## 2022-10-04 PROCEDURE — 99213 OFFICE O/P EST LOW 20 MIN: CPT | Performed by: NURSE PRACTITIONER

## 2022-10-04 PROCEDURE — 96372 THER/PROPH/DIAG INJ SC/IM: CPT | Performed by: NURSE PRACTITIONER

## 2022-10-04 RX ORDER — TRIAMCINOLONE ACETONIDE 40 MG/ML
80 INJECTION, SUSPENSION INTRA-ARTICULAR; INTRAMUSCULAR ONCE
Status: COMPLETED | OUTPATIENT
Start: 2022-10-04 | End: 2022-10-04

## 2022-10-04 RX ORDER — HYDROCODONE BITARTRATE AND ACETAMINOPHEN 7.5; 325 MG/1; MG/1
1 TABLET ORAL EVERY 6 HOURS PRN
Qty: 15 TABLET | Refills: 0 | Status: SHIPPED | OUTPATIENT
Start: 2022-10-04 | End: 2022-10-11

## 2022-10-04 RX ADMIN — TRIAMCINOLONE ACETONIDE 80 MG: 40 INJECTION, SUSPENSION INTRA-ARTICULAR; INTRAMUSCULAR at 10:22

## 2022-10-04 NOTE — PROGRESS NOTES
"Chief Complaint   Patient presents with   • Hip Pain     Left hip, hurts to walk         Subjective   Denisa Michelle is a 62 y.o. female who presents today for left hip pain.     HPI   She has had left hip pain for a week or more. It started with left groin pain and then spread all the way around her hip and buttock areas. She has had several falls but cannot remember injuring her hip that way. She is having difficulty ambulating today.     Allergies   Allergen Reactions   • Clindamycin/Lincomycin Hives and Rash         OBJECTIVE:  Vitals:    10/04/22 1004   Pulse: (!) 124   Temp: 96.7 °F (35.9 °C)   SpO2: 95%   Weight: 72.2 kg (159 lb 3.2 oz)   Height: 158.8 cm (62.5\")     Physical Exam  Vitals and nursing note reviewed.   Constitutional:       Appearance: Normal appearance.   Cardiovascular:      Rate and Rhythm: Normal rate and regular rhythm.      Pulses: Normal pulses.      Heart sounds: Normal heart sounds.   Pulmonary:      Effort: Pulmonary effort is normal.      Breath sounds: Normal breath sounds.   Musculoskeletal:      Left hip: Tenderness present. Decreased range of motion. Decreased strength.      Comments: Walks with a prevalent limp.    Skin:     General: Skin is warm and dry.   Neurological:      General: No focal deficit present.      Mental Status: She is alert and oriented to person, place, and time.   Psychiatric:         Mood and Affect: Mood normal.         Behavior: Behavior normal.         Thought Content: Thought content normal.         Judgment: Judgment normal.                    ASSESSMENT/ PLAN:    Diagnoses and all orders for this visit:    1. Acute hip pain, left (Primary)  -     triamcinolone acetonide (KENALOG-40) injection 80 mg  -     HYDROcodone-acetaminophen (Norco) 7.5-325 MG per tablet; Take 1 tablet by mouth Every 6 (Six) Hours As Needed for Moderate Pain.  Dispense: 15 tablet; Refill: 0  -     XR Hip With or Without Pelvis 2 - 3 View Left; Future      Procedures "     Management Plan:     An After Visit Summary was printed and given to the patient at discharge.    Follow-up: No follow-ups on file.    I spent 20 minutes caring for Denisa on this date of service. This time includes time spent by me in the following activities: preparing for the visit, obtaining and/or reviewing a separately obtained history, performing a medically appropriate examination and/or evaluation, ordering medications, tests, or procedures and documenting information in the medical record.      NEW Trivedi 10/4/2022 10:17 CDT  This note was electronically signed.

## 2022-10-05 ENCOUNTER — TELEPHONE (OUTPATIENT)
Dept: FAMILY MEDICINE CLINIC | Facility: CLINIC | Age: 62
End: 2022-10-05

## 2022-10-05 DIAGNOSIS — M25.552 ACUTE HIP PAIN, LEFT: ICD-10-CM

## 2022-10-11 ENCOUNTER — OFFICE VISIT (OUTPATIENT)
Dept: FAMILY MEDICINE CLINIC | Facility: CLINIC | Age: 62
End: 2022-10-11

## 2022-10-11 VITALS
SYSTOLIC BLOOD PRESSURE: 93 MMHG | HEART RATE: 92 BPM | TEMPERATURE: 96.6 F | OXYGEN SATURATION: 96 % | BODY MASS INDEX: 28.21 KG/M2 | HEIGHT: 63 IN | DIASTOLIC BLOOD PRESSURE: 60 MMHG | WEIGHT: 159.2 LBS

## 2022-10-11 DIAGNOSIS — R26.9 UNABLE TO AMBULATE SAFELY ON UNEVEN TERRAIN: ICD-10-CM

## 2022-10-11 DIAGNOSIS — M25.552 LEFT HIP PAIN: Primary | ICD-10-CM

## 2022-10-11 PROCEDURE — 99213 OFFICE O/P EST LOW 20 MIN: CPT | Performed by: NURSE PRACTITIONER

## 2022-10-11 RX ORDER — HYDROCODONE BITARTRATE AND ACETAMINOPHEN 10; 325 MG/1; MG/1
1 TABLET ORAL EVERY 4 HOURS PRN
Qty: 15 TABLET | Refills: 0 | Status: SHIPPED | OUTPATIENT
Start: 2022-10-11 | End: 2023-01-05 | Stop reason: DRUGHIGH

## 2022-10-11 NOTE — PROGRESS NOTES
"Chief Complaint   Patient presents with   • Follow-up     Left hip        Subjective   Denisa Michelle is a 62 y.o. female who presents today for left hip pain.     HPI   She continues to have pain after the steroid injection on 10/4. She states the pain is with movement. She walks her dog daily and this is a hardship for her. Yesterday she got in her car and got her left leg in and felt like her hip was going to go out of socket. X-ray is normal. She rates her pain a 10 from 0-10. She has taken norco 7.5 for her pain. This morning 7.5 was not enough so she took one and a half and it has helped.  She is having difficulty ambulating. She shuffles when she walks. She is unable to ambulate where there is an uneven surface.    Allergies   Allergen Reactions   • Clindamycin/Lincomycin Hives and Rash         OBJECTIVE:  Vitals:    10/11/22 0949   BP: 93/60   BP Location: Left arm   Patient Position: Sitting   Cuff Size: Adult   Pulse: 92   Temp: 96.6 °F (35.9 °C)   SpO2: 96%   Weight: 72.2 kg (159 lb 3.2 oz)   Height: 158.8 cm (62.5\")     Physical Exam  Vitals and nursing note reviewed.   Constitutional:       Appearance: Normal appearance.   Cardiovascular:      Rate and Rhythm: Normal rate and regular rhythm.      Pulses: Normal pulses.      Heart sounds: Normal heart sounds.   Pulmonary:      Effort: Pulmonary effort is normal.      Breath sounds: Normal breath sounds.   Musculoskeletal:      Comments: Pain with movement at insertion of the left hip joint.    Skin:     General: Skin is warm and dry.   Neurological:      General: No focal deficit present.      Mental Status: She is alert and oriented to person, place, and time.   Psychiatric:         Mood and Affect: Mood normal.         Behavior: Behavior normal.         Thought Content: Thought content normal.         Judgment: Judgment normal.                    ASSESSMENT/ PLAN:    Diagnoses and all orders for this visit:    1. Left hip pain (Primary)  -     " Ambulatory Referral to Orthopedic Surgery  -     HYDROcodone-acetaminophen (Norco)  MG per tablet; Take 1 tablet by mouth Every 4 (Four) Hours As Needed for Moderate Pain or Severe Pain.  Dispense: 15 tablet; Refill: 0    2. Unable to ambulate safely on uneven terrain      Procedures     Management Plan:     An After Visit Summary was printed and given to the patient at discharge.    Follow-up: Return if symptoms worsen or fail to improve.    I spent 20 minutes caring for Denisa on this date of service. This time includes time spent by me in the following activities: preparing for the visit, obtaining and/or reviewing a separately obtained history, performing a medically appropriate examination and/or evaluation, ordering medications, tests, or procedures, referring and communicating with other health care professionals and documenting information in the medical record.      Shelli Da Silav, NEW 10/11/2022 10:14 CDT  This note was electronically signed.

## 2022-10-24 ENCOUNTER — APPOINTMENT (OUTPATIENT)
Dept: MRI IMAGING | Facility: HOSPITAL | Age: 62
End: 2022-10-24

## 2022-10-24 ENCOUNTER — TELEPHONE (OUTPATIENT)
Dept: FAMILY MEDICINE CLINIC | Facility: CLINIC | Age: 62
End: 2022-10-24

## 2022-10-24 NOTE — TELEPHONE ENCOUNTER
Caller: Miguel Angel Denisa R    Relationship: Self    Best call back number: [phone]    What form or medical record are you requesting: THE PATIENT STATES THAT SHE NEEDS THE REPORT FROM THE XRAY OF HER LEFT HIP.    Who is requesting this form or medical record from you: THE PATIENT STATES THAT SHE NEEDS THE REPORT FOR A DOCTOR APPOINTMENT ON  10.26.22.    How would you like to receive the form or medical records (pick-up, mail, fax):    If pick-up, provide patient with address and location details     Timeframe paperwork needed:  ON 10/25/22 AT 11:00 AM

## 2022-10-31 ENCOUNTER — HOSPITAL ENCOUNTER (OUTPATIENT)
Dept: MRI IMAGING | Facility: HOSPITAL | Age: 62
Discharge: HOME | End: 2022-10-31
Admitting: PSYCHIATRY & NEUROLOGY

## 2022-10-31 DIAGNOSIS — M53.82 NECK MUSCLE WEAKNESS: Primary | ICD-10-CM

## 2022-10-31 DIAGNOSIS — R29.6 FALLS FREQUENTLY: ICD-10-CM

## 2022-10-31 DIAGNOSIS — M53.82 NECK MUSCLE WEAKNESS: ICD-10-CM

## 2022-10-31 PROCEDURE — 82565 ASSAY OF CREATININE: CPT

## 2022-10-31 PROCEDURE — 70553 MRI BRAIN STEM W/O & W/DYE: CPT

## 2022-10-31 PROCEDURE — 0 GADOBENATE DIMEGLUMINE 529 MG/ML SOLUTION: Performed by: PSYCHIATRY & NEUROLOGY

## 2022-10-31 PROCEDURE — A9577 INJ MULTIHANCE: HCPCS | Performed by: PSYCHIATRY & NEUROLOGY

## 2022-10-31 RX ADMIN — GADOBENATE DIMEGLUMINE 14 ML: 529 INJECTION, SOLUTION INTRAVENOUS at 08:16

## 2022-11-01 ENCOUNTER — TELEPHONE (OUTPATIENT)
Dept: FAMILY MEDICINE CLINIC | Facility: CLINIC | Age: 62
End: 2022-11-01

## 2022-11-01 DIAGNOSIS — K59.04 CHRONIC IDIOPATHIC CONSTIPATION: Primary | ICD-10-CM

## 2022-11-01 LAB — CREAT BLDA-MCNC: 1.1 MG/DL (ref 0.6–1.3)

## 2022-11-01 RX ORDER — BISACODYL 10 MG
10 SUPPOSITORY, RECTAL RECTAL 2 TIMES DAILY
Qty: 15 SUPPOSITORY | Refills: 3 | Status: SHIPPED | OUTPATIENT
Start: 2022-11-01

## 2022-11-01 NOTE — TELEPHONE ENCOUNTER
Caller: Denisa Michelle    Relationship: Self    Best call back number: [phone]    What medication are you requesting:     DULCALOX SUPPOSITORY    What are your current symptoms:     ISSUES WITH CONSTIPATION    How long have you been experiencing symptoms:     ABOUT 1 WEEK    PATIENT HAS TRIED, MILK OF MAGNESIA, FLEET ENEMA    Have you had these symptoms before:    [x] Yes  [] No    Have you been treated for these symptoms before:   [x] Yes  [] No    If a prescription is needed, what is your preferred pharmacy and phone number:        [facility] [place], [place] - [address] [facility] [phone] [facility] [phone]      copious diarrhea

## 2022-11-18 ENCOUNTER — CLINICAL SUPPORT (OUTPATIENT)
Dept: FAMILY MEDICINE CLINIC | Facility: CLINIC | Age: 62
End: 2022-11-18

## 2022-11-18 DIAGNOSIS — Z11.59 ENCOUNTER FOR HEPATITIS C SCREENING TEST FOR LOW RISK PATIENT: ICD-10-CM

## 2022-11-18 DIAGNOSIS — E03.9 ACQUIRED HYPOTHYROIDISM: ICD-10-CM

## 2022-11-18 DIAGNOSIS — Z00.00 MEDICARE ANNUAL WELLNESS VISIT, SUBSEQUENT: ICD-10-CM

## 2022-11-18 DIAGNOSIS — E78.2 MIXED HYPERLIPIDEMIA: Primary | ICD-10-CM

## 2022-11-19 LAB
ALBUMIN SERPL-MCNC: 4.2 G/DL (ref 3.8–4.8)
ALBUMIN/GLOB SERPL: 2.5 {RATIO} (ref 1.2–2.2)
ALP SERPL-CCNC: 65 IU/L (ref 44–121)
ALT SERPL-CCNC: 13 IU/L (ref 0–32)
AST SERPL-CCNC: 15 IU/L (ref 0–40)
BASOPHILS # BLD AUTO: 0 X10E3/UL (ref 0–0.2)
BASOPHILS NFR BLD AUTO: 0 %
BILIRUB SERPL-MCNC: 0.6 MG/DL (ref 0–1.2)
BUN SERPL-MCNC: 7 MG/DL (ref 8–27)
BUN/CREAT SERPL: 6 (ref 12–28)
CALCIUM SERPL-MCNC: 9.2 MG/DL (ref 8.7–10.3)
CHLORIDE SERPL-SCNC: 99 MMOL/L (ref 96–106)
CHOLEST SERPL-MCNC: 154 MG/DL (ref 100–199)
CO2 SERPL-SCNC: 21 MMOL/L (ref 20–29)
CREAT SERPL-MCNC: 1.11 MG/DL (ref 0.57–1)
EGFRCR SERPLBLD CKD-EPI 2021: 56 ML/MIN/1.73
EOSINOPHIL # BLD AUTO: 0 X10E3/UL (ref 0–0.4)
EOSINOPHIL NFR BLD AUTO: 1 %
ERYTHROCYTE [DISTWIDTH] IN BLOOD BY AUTOMATED COUNT: 13.2 % (ref 11.7–15.4)
GLOBULIN SER CALC-MCNC: 1.7 G/DL (ref 1.5–4.5)
GLUCOSE SERPL-MCNC: 85 MG/DL (ref 70–99)
HCT VFR BLD AUTO: 44.3 % (ref 34–46.6)
HCV AB S/CO SERPL IA: <0.1 S/CO RATIO (ref 0–0.9)
HDLC SERPL-MCNC: 68 MG/DL
HGB BLD-MCNC: 14.6 G/DL (ref 11.1–15.9)
IMM GRANULOCYTES # BLD AUTO: 0 X10E3/UL (ref 0–0.1)
IMM GRANULOCYTES NFR BLD AUTO: 0 %
LDLC SERPL CALC-MCNC: 69 MG/DL (ref 0–99)
LDLC/HDLC SERPL: 1 RATIO (ref 0–3.2)
LYMPHOCYTES # BLD AUTO: 1.9 X10E3/UL (ref 0.7–3.1)
LYMPHOCYTES NFR BLD AUTO: 34 %
MCH RBC QN AUTO: 31 PG (ref 26.6–33)
MCHC RBC AUTO-ENTMCNC: 33 G/DL (ref 31.5–35.7)
MCV RBC AUTO: 94 FL (ref 79–97)
MONOCYTES # BLD AUTO: 0.5 X10E3/UL (ref 0.1–0.9)
MONOCYTES NFR BLD AUTO: 8 %
NEUTROPHILS # BLD AUTO: 3.2 X10E3/UL (ref 1.4–7)
NEUTROPHILS NFR BLD AUTO: 57 %
PLATELET # BLD AUTO: 252 X10E3/UL (ref 150–450)
POTASSIUM SERPL-SCNC: 4.5 MMOL/L (ref 3.5–5.2)
PROT SERPL-MCNC: 5.9 G/DL (ref 6–8.5)
RBC # BLD AUTO: 4.71 X10E6/UL (ref 3.77–5.28)
SODIUM SERPL-SCNC: 134 MMOL/L (ref 134–144)
T4 SERPL-MCNC: 11.1 UG/DL (ref 4.5–12)
TRIGL SERPL-MCNC: 92 MG/DL (ref 0–149)
TSH SERPL DL<=0.005 MIU/L-ACNC: 1.96 UIU/ML (ref 0.45–4.5)
VLDLC SERPL CALC-MCNC: 17 MG/DL (ref 5–40)
WBC # BLD AUTO: 5.7 X10E3/UL (ref 3.4–10.8)

## 2022-11-21 ENCOUNTER — OFFICE VISIT (OUTPATIENT)
Dept: FAMILY MEDICINE CLINIC | Facility: CLINIC | Age: 62
End: 2022-11-21

## 2022-11-21 VITALS
HEART RATE: 74 BPM | BODY MASS INDEX: 27.54 KG/M2 | WEIGHT: 155.4 LBS | HEIGHT: 63 IN | TEMPERATURE: 97.6 F | SYSTOLIC BLOOD PRESSURE: 119 MMHG | OXYGEN SATURATION: 96 % | DIASTOLIC BLOOD PRESSURE: 77 MMHG

## 2022-11-21 DIAGNOSIS — F51.01 PRIMARY INSOMNIA: ICD-10-CM

## 2022-11-21 DIAGNOSIS — F41.9 ANXIETY AND DEPRESSION: ICD-10-CM

## 2022-11-21 DIAGNOSIS — E66.3 OVERWEIGHT (BMI 25.0-29.9): ICD-10-CM

## 2022-11-21 DIAGNOSIS — S86.899A ANTERIOR SHIN SPLINTS: ICD-10-CM

## 2022-11-21 DIAGNOSIS — J30.2 SEASONAL ALLERGIES: ICD-10-CM

## 2022-11-21 DIAGNOSIS — E78.00 ELEVATED LDL CHOLESTEROL LEVEL: ICD-10-CM

## 2022-11-21 DIAGNOSIS — Z00.00 ENCOUNTER FOR SUBSEQUENT ANNUAL WELLNESS VISIT IN MEDICARE PATIENT: Primary | ICD-10-CM

## 2022-11-21 DIAGNOSIS — F32.A ANXIETY AND DEPRESSION: ICD-10-CM

## 2022-11-21 DIAGNOSIS — M15.9 PRIMARY OSTEOARTHRITIS INVOLVING MULTIPLE JOINTS: ICD-10-CM

## 2022-11-21 DIAGNOSIS — R32 URINARY INCONTINENCE, UNSPECIFIED TYPE: ICD-10-CM

## 2022-11-21 PROCEDURE — 1125F AMNT PAIN NOTED PAIN PRSNT: CPT | Performed by: NURSE PRACTITIONER

## 2022-11-21 PROCEDURE — 1159F MED LIST DOCD IN RCRD: CPT | Performed by: NURSE PRACTITIONER

## 2022-11-21 PROCEDURE — 96160 PT-FOCUSED HLTH RISK ASSMT: CPT | Performed by: NURSE PRACTITIONER

## 2022-11-21 PROCEDURE — 1170F FXNL STATUS ASSESSED: CPT | Performed by: NURSE PRACTITIONER

## 2022-11-21 PROCEDURE — G0439 PPPS, SUBSEQ VISIT: HCPCS | Performed by: NURSE PRACTITIONER

## 2022-11-21 RX ORDER — NABUMETONE 500 MG/1
500 TABLET, FILM COATED ORAL 2 TIMES DAILY
COMMUNITY
Start: 2022-11-11 | End: 2023-01-05 | Stop reason: ALTCHOICE

## 2022-11-21 NOTE — PROGRESS NOTES
The ABCs of the Annual Wellness Visit  Subsequent Medicare Wellness Visit    Chief Complaint   Patient presents with   • Medicare Wellness-subsequent      Subjective    History of Present Illness:  Denisa Michelle is a 62 y.o. female who presents for a Subsequent Medicare Wellness Visit.  She is seeing neurology for episodes of not being able to hold her head upright. She will find out the results of this at the end of the month.  She is experiencing noctural incontinence. It has not been improved with any over active bladder medications.   She has anxiety and depression being treated by a psychiatrist.   She is being seen for multiple joint pain by a rheumatologist.   She complains of shin splints on the left side.   The following portions of the patient's history were reviewed and   updated as appropriate: allergies, current medications, past family history, past medical history, past social history, past surgical history and problem list.    Compared to one year ago, the patient feels her physical   health is worse.    Compared to one year ago, the patient feels her mental   health is worse.    Recent Hospitalizations:  She was not admitted to the hospital during the last year.       Current Medical Providers:  Patient Care Team:  Shelli Da Silva APRN as PCP - General (Family Medicine)    Outpatient Medications Prior to Visit   Medication Sig Dispense Refill   • ALPRAZolam (XANAX) 1 MG tablet Take 1 mg by mouth 2 (two) times a day. 2 tabs BID with an additional 2 tabs at night     • atorvastatin (LIPITOR) 40 MG tablet Take 1 tablet by mouth Every Night. 90 tablet 3   • bisacodyl (Dulcolax) 10 MG suppository Insert 1 suppository into the rectum 2 (Two) Times a Day. 15 suppository 3   • busPIRone (BUSPAR) 30 MG tablet Take 30 mg by mouth 2 (Two) Times a Day. Patient takes 45 mg BID     • celecoxib (CeleBREX) 200 MG capsule Take 1 capsule by mouth Daily. 90 capsule 3   • levothyroxine (SYNTHROID, LEVOTHROID) 50 MCG  tablet Take 1 tablet by mouth Daily. 90 tablet 3   • loratadine-pseudoephedrine (Claritin-D 12 Hour) 5-120 MG per 12 hr tablet Take 1 tablet by mouth 2 (Two) Times a Day. (Patient taking differently: Take 1 tablet by mouth Daily.) 20 tablet 6   • Magnesium 500 MG tablet Take 750 mg by mouth every night at bedtime.     • melatonin 5 MG tablet tablet Take 40 mg by mouth Every Night.     • nitroglycerin (NITROSTAT) 0.4 MG SL tablet Place 0.4 mg under the tongue Every 5 (Five) Minutes As Needed for Chest Pain. Take no more than 3 doses in 15 minutes.     • omeprazole (priLOSEC) 20 MG capsule Take 20 mg by mouth Daily.     • prednisoLONE acetate (Pred Forte) 1 % ophthalmic suspension Administer 2 drops into the left eye 4 (Four) Times a Day. (Patient taking differently: Administer 2 drops into the left eye As Needed.) 10 mL 3   • pregabalin (LYRICA) 150 MG capsule Take 150 mg by mouth Daily. 75 MG in the morning and 150 MG at night     • traZODone (DESYREL) 50 MG tablet Take 100 mg by mouth Every Night.     • HYDROcodone-acetaminophen (Norco)  MG per tablet Take 1 tablet by mouth Every 4 (Four) Hours As Needed for Moderate Pain or Severe Pain. 15 tablet 0   • nabumetone (RELAFEN) 500 MG tablet Take 500 mg by mouth 2 (Two) Times a Day.       No facility-administered medications prior to visit.       Opioid medication/s are on active medication list.  and I have evaluated her active treatment plan and pain score trends (see table).  Vitals:    11/21/22 1031   PainSc:   4     I have reviewed the chart for potential of high risk medication and harmful drug interactions in the elderly.            Aspirin is not on active medication list.  Aspirin use is indicated based on review of current medical condition/s. Pros and cons of this therapy have been discussed with this patient. Benefits of this medication outweigh potential harm.  Patient has been instructed to start taking this medication..    Patient Active Problem  "List   Diagnosis   • Sinus node dysfunction (HCC)   • Chest pain in adult   • Anxiety   • Mixed hyperlipidemia   • Major depressive disorder, recurrent, moderate (HCC)   • GERD without esophagitis   • Primary insomnia   • Acquired hypothyroidism   • Bilateral hip pain   • Right thigh pain   • Seasonal allergies   • Class 1 obesity due to excess calories without serious comorbidity with body mass index (BMI) of 30.0 to 30.9 in adult     Advance Care Planning  Advance Directive is not on file.  ACP discussion was declined by the patient. Patient does not have an advance directive, declines further assistance.          Objective    Vitals:    11/21/22 1031   BP: 119/77   BP Location: Left arm   Patient Position: Sitting   Cuff Size: Adult   Pulse: 74   Temp: 97.6 °F (36.4 °C)   SpO2: 96%   Weight: 70.5 kg (155 lb 6.4 oz)   Height: 158.8 cm (62.5\")   PainSc:   4     Estimated body mass index is 27.97 kg/m² as calculated from the following:    Height as of this encounter: 158.8 cm (62.5\").    Weight as of this encounter: 70.5 kg (155 lb 6.4 oz).    BMI is >= 25 and <30. (Overweight) The following options were offered after discussion;: exercise counseling/recommendations and nutrition counseling/recommendations      Does the patient have evidence of cognitive impairment? No    Physical Exam  Vitals and nursing note reviewed.   Constitutional:       Appearance: Normal appearance.   HENT:      Head: Normocephalic and atraumatic.      Right Ear: Tympanic membrane, ear canal and external ear normal.      Left Ear: Tympanic membrane, ear canal and external ear normal.      Nose: Nose normal.      Mouth/Throat:      Mouth: Mucous membranes are moist.      Pharynx: Oropharynx is clear.   Eyes:      Extraocular Movements: Extraocular movements intact.      Pupils: Pupils are equal, round, and reactive to light.   Cardiovascular:      Rate and Rhythm: Normal rate and regular rhythm.      Pulses: Normal pulses.      Heart sounds: " Normal heart sounds.   Pulmonary:      Effort: Pulmonary effort is normal.      Breath sounds: Normal breath sounds.   Abdominal:      General: Abdomen is flat. Bowel sounds are normal.      Palpations: Abdomen is soft.   Musculoskeletal:         General: Normal range of motion.      Cervical back: Normal range of motion and neck supple.   Skin:     General: Skin is warm and dry.      Capillary Refill: Capillary refill takes less than 2 seconds.   Neurological:      General: No focal deficit present.      Mental Status: She is alert and oriented to person, place, and time.   Psychiatric:         Mood and Affect: Mood normal.         Behavior: Behavior normal.         Thought Content: Thought content normal.         Judgment: Judgment normal.       Lab Results   Component Value Date    CHLPL 154 11/18/2022    TRIG 92 11/18/2022    HDL 68 11/18/2022    LDL 69 11/18/2022    VLDL 17 11/18/2022            HEALTH RISK ASSESSMENT    Smoking Status:  Social History     Tobacco Use   Smoking Status Every Day   • Packs/day: 1.00   • Years: 30.00   • Pack years: 30.00   • Types: Cigarettes   • Start date: 1/1/1992   Smokeless Tobacco Current     Alcohol Consumption:  Social History     Substance and Sexual Activity   Alcohol Use Never     Fall Risk Screen:    [facility], and patient is at HIGH risk for falls. Assessment completed on:11/21/2022    Depression Screening:  PHQ-2/PHQ-9 Depression Screening 6/8/2022   Little Interest or Pleasure in Doing Things 0-->not at all   Feeling Down, Depressed or Hopeless 0-->not at all   PHQ-9: Brief Depression Severity Measure Score 0       Health Habits and Functional and Cognitive Screening:  Functional & Cognitive Status 11/21/2022   Do you have difficulty preparing food and eating? Yes   Do you have difficulty bathing yourself, getting dressed or grooming yourself? No   Do you have difficulty using the toilet? Yes   Do you have difficulty moving around  from place to place? Yes   Do you have trouble with steps or getting out of a bed or a chair? Yes   Current Diet Unhealthy Diet   Dental Exam Up to date   Eye Exam Up to date   Exercise (times per week) 7 times per week   Current Exercises Include Walking   Do you need help using the phone?  No   Are you deaf or do you have serious difficulty hearing?  No   Do you need help with transportation? No   Do you need help shopping? No   Do you need help preparing meals?  No   Do you need help with housework?  Yes   Do you need help with laundry? No   Do you need help taking your medications? No   Do you need help managing money? No   Do you ever drive or ride in a car without wearing a seat belt? No   Have you felt unusual stress, anger or loneliness in the last month? No   Who do you live with? Alone   If you need help, do you have trouble finding someone available to you? No   Have you been bothered in the last four weeks by sexual problems? No   Do you have difficulty concentrating, remembering or making decisions? Yes       Age-appropriate Screening Schedule:  Refer to the list below for future screening recommendations based on patient's age, sex and/or medical conditions. Orders for these recommended tests are listed in the plan section. The patient has been provided with a written plan.    Health Maintenance   Topic Date Due   • PAP SMEAR  07/01/2023 (Originally 11/26/2020)   • TDAP/TD VACCINES (1 - Tdap) 07/28/2023 (Originally 7/22/1979)   • ZOSTER VACCINE (2 of 2) 07/28/2023 (Originally 12/4/2020)   • LIPID PANEL  11/18/2023   • MAMMOGRAM  09/20/2024   • INFLUENZA VACCINE  Completed              Assessment & Plan   CMS Preventative Services Quick Reference  Risk Factors Identified During Encounter  Chronic Pain   Depression/Dysphoria  Inactivity/Sedentary  Obesity/Overweight   Polypharmacy  The above risks/problems have been discussed with the patient.  Follow up actions/plans if indicated are seen below in the  Assessment/Plan Section.  Pertinent information has been shared with the patient in the After Visit Summary.    Diagnoses and all orders for this visit:    1. Encounter for subsequent annual wellness visit in Medicare patient (Primary)    2. Anxiety and depression    3. Primary osteoarthritis involving multiple joints    4. Primary insomnia    5. Seasonal allergies    6. Overweight (BMI 25.0-29.9)    7. Urinary incontinence, unspecified type    8. Anterior shin splints    9. Elevated LDL cholesterol level    She did not want treatment today for any problems. She is to continue to take her medications as prescribed.   She is to follow up with specialists as needed.   She was given an ACE wrap for her left leg.   Encouraged to eat a diet rich in fruit, vegetables, and protein.  Encouraged to drink 64 oz of water daily.   Encouraged to start an exercise regimen.   Follow Up:   Return in about 6 months (around 5/21/2023) for Next scheduled follow up with labs.     An After Visit Summary and PPPS were made available to the patient.

## 2022-11-21 NOTE — PROGRESS NOTES
"Chief Complaint   Patient presents with   • Medicare Wellness-subsequent        Subjective   Denisa Michelle is a 62 y.o. female who presents today for ***    HPI       Allergies   Allergen Reactions   • Clindamycin/Lincomycin Hives and Rash         OBJECTIVE:  Vitals:    11/21/22 1031   BP: 119/77   BP Location: Left arm   Patient Position: Sitting   Cuff Size: Adult   Pulse: 74   Temp: 97.6 °F (36.4 °C)   SpO2: 96%   Weight: 70.5 kg (155 lb 6.4 oz)   Height: 158.8 cm (62.5\")     Physical Exam    {BMI is >= 25 and <30. (Overweight) The following options were offered after discussion; (Optional):21876}       {Ambulatory Labs (Optional):26176}    ASSESSMENT/ PLAN:    There are no diagnoses linked to this encounter.  Procedures     Management Plan:     An After Visit Summary was printed and given to the patient at discharge.    Follow-up: No follow-ups on file.    {2021TIMESPENTOPTIONAL (Optional):37388}     Shelli Da Silva, NEW 11/21/2022 10:57 CST  This note was electronically signed.          "

## 2022-11-23 ENCOUNTER — TELEPHONE (OUTPATIENT)
Dept: FAMILY MEDICINE CLINIC | Facility: CLINIC | Age: 62
End: 2022-11-23

## 2022-11-23 DIAGNOSIS — R05.1 ACUTE COUGH: Primary | ICD-10-CM

## 2022-11-23 DIAGNOSIS — R05.1 ACUTE COUGH: ICD-10-CM

## 2022-11-23 RX ORDER — PROMETHAZINE HYDROCHLORIDE AND CODEINE PHOSPHATE 6.25; 1 MG/5ML; MG/5ML
5 SOLUTION ORAL EVERY 4 HOURS PRN
Qty: 180 ML | Refills: 0 | Status: SHIPPED | OUTPATIENT
Start: 2022-11-23 | End: 2022-11-24 | Stop reason: SDUPTHER

## 2022-11-23 RX ORDER — PROMETHAZINE HYDROCHLORIDE AND CODEINE PHOSPHATE 6.25; 1 MG/5ML; MG/5ML
5 SOLUTION ORAL EVERY 4 HOURS PRN
Qty: 180 ML | Refills: 0 | Status: CANCELLED | OUTPATIENT
Start: 2022-11-23

## 2022-11-23 NOTE — TELEPHONE ENCOUNTER
Caller: Denisa Michelle    Relationship: Self    Best call back number: [phone]    What medications are you currently taking:   Current Outpatient Medications on File Prior to Visit   Medication Sig Dispense Refill   • ALPRAZolam (XANAX) 1 MG tablet Take 1 mg by mouth 2 (two) times a day. 2 tabs BID with an additional 2 tabs at night     • atorvastatin (LIPITOR) 40 MG tablet Take 1 tablet by mouth Every Night. 90 tablet 3   • bisacodyl (Dulcolax) 10 MG suppository Insert 1 suppository into the rectum 2 (Two) Times a Day. 15 suppository 3   • busPIRone (BUSPAR) 30 MG tablet Take 30 mg by mouth 2 (Two) Times a Day. Patient takes 45 mg BID     • celecoxib (CeleBREX) 200 MG capsule Take 1 capsule by mouth Daily. 90 capsule 3   • HYDROcodone-acetaminophen (Norco)  MG per tablet Take 1 tablet by mouth Every 4 (Four) Hours As Needed for Moderate Pain or Severe Pain. 15 tablet 0   • levothyroxine (SYNTHROID, LEVOTHROID) 50 MCG tablet Take 1 tablet by mouth Daily. 90 tablet 3   • loratadine-pseudoephedrine (Claritin-D 12 Hour) 5-120 MG per 12 hr tablet Take 1 tablet by mouth 2 (Two) Times a Day. (Patient taking differently: Take 1 tablet by mouth Daily.) 20 tablet 6   • Magnesium 500 MG tablet Take 750 mg by mouth every night at bedtime.     • melatonin 5 MG tablet tablet Take 40 mg by mouth Every Night.     • nabumetone (RELAFEN) 500 MG tablet Take 500 mg by mouth 2 (Two) Times a Day.     • nitroglycerin (NITROSTAT) 0.4 MG SL tablet Place 0.4 mg under the tongue Every 5 (Five) Minutes As Needed for Chest Pain. Take no more than 3 doses in 15 minutes.     • omeprazole (priLOSEC) 20 MG capsule Take 20 mg by mouth Daily.     • prednisoLONE acetate (Pred Forte) 1 % ophthalmic suspension Administer 2 drops into the left eye 4 (Four) Times a Day. (Patient taking differently: Administer 2 drops into the left eye As Needed.) 10 mL 3   • pregabalin (LYRICA) 150 MG capsule Take 150 mg by mouth Daily. 75 MG in the morning  and 150 MG at night     • promethazine-codeine (PHENERGAN with CODEINE) 6.25-10 MG/5ML solution Take 5 mL by mouth Every 4 (Four) Hours As Needed for Cough. 180 mL 0   • traZODone (DESYREL) 50 MG tablet Take 100 mg by mouth Every Night.       No current facility-administered medications on file prior to visit.        Which medication are you concerned about: promethazine-codeine (PHENERGAN with CODEINE) 6.25-10 MG/5ML solution    Who prescribed you this medication: SHONA BRADLEY    What are your concerns: PATIENT REQUESTING THIS PRESCRIPTION BE TRANSFERRED TO THE [facility] - [place], [place] - [address] [place] [address] [phone] [facility] [phone]   [phone].

## 2022-11-23 NOTE — TELEPHONE ENCOUNTER
Caller: Denisa Michelle    Relationship: Self    Best call back number: [phone]    What medication are you requesting: COUGH SYRUP    What are your current symptoms: COUGH    How long have you been experiencing symptoms: ONE DAY    Have you had these symptoms before:    [x] Yes  [] No    Have you been treated for these symptoms before:   [x] Yes  [] No    If a prescription is needed, what is your preferred pharmacy and phone number: [facility] [place], [place] - [address] [place] [address] [phone] [facility] [phone]      Additional notes:    PATIENT IS WONDERING IF COUGH SYRUP CAN BE PRESCRIBED TO TREAT HER COUGH?    PLEASE CALL PATIENT IF ANY MEDICATION IS CALLED IN.

## 2022-11-24 DIAGNOSIS — R05.1 ACUTE COUGH: ICD-10-CM

## 2022-11-24 RX ORDER — PROMETHAZINE HYDROCHLORIDE AND CODEINE PHOSPHATE 6.25; 1 MG/5ML; MG/5ML
5 SOLUTION ORAL EVERY 4 HOURS PRN
Qty: 180 ML | Refills: 0 | Status: SHIPPED | OUTPATIENT
Start: 2022-11-24

## 2022-11-30 ENCOUNTER — HOSPITAL ENCOUNTER (OUTPATIENT)
Dept: NEUROLOGY | Facility: HOSPITAL | Age: 62
Discharge: HOME | End: 2022-11-30
Admitting: PSYCHIATRY & NEUROLOGY

## 2022-11-30 DIAGNOSIS — M53.82 NECK MUSCLE WEAKNESS: ICD-10-CM

## 2022-11-30 PROCEDURE — 95909 NRV CNDJ TST 5-6 STUDIES: CPT | Performed by: PSYCHIATRY & NEUROLOGY

## 2022-11-30 PROCEDURE — 95909 NRV CNDJ TST 5-6 STUDIES: CPT

## 2022-11-30 PROCEDURE — 95937 NEUROMUSCULAR JUNCTION TEST: CPT

## 2022-11-30 PROCEDURE — 95885 MUSC TST DONE W/NERV TST LIM: CPT

## 2022-11-30 PROCEDURE — 95885 MUSC TST DONE W/NERV TST LIM: CPT | Performed by: PSYCHIATRY & NEUROLOGY

## 2022-12-12 ENCOUNTER — OFFICE VISIT (OUTPATIENT)
Dept: FAMILY MEDICINE CLINIC | Facility: CLINIC | Age: 62
End: 2022-12-12

## 2022-12-12 VITALS
SYSTOLIC BLOOD PRESSURE: 83 MMHG | BODY MASS INDEX: 27.32 KG/M2 | OXYGEN SATURATION: 98 % | DIASTOLIC BLOOD PRESSURE: 56 MMHG | WEIGHT: 154.2 LBS | HEART RATE: 83 BPM | HEIGHT: 63 IN | TEMPERATURE: 97.4 F

## 2022-12-12 DIAGNOSIS — M25.552 LEFT HIP PAIN: Primary | ICD-10-CM

## 2022-12-12 PROCEDURE — 99213 OFFICE O/P EST LOW 20 MIN: CPT | Performed by: NURSE PRACTITIONER

## 2022-12-12 PROCEDURE — 96372 THER/PROPH/DIAG INJ SC/IM: CPT | Performed by: NURSE PRACTITIONER

## 2022-12-12 RX ORDER — HYDROCODONE BITARTRATE AND ACETAMINOPHEN 5; 325 MG/1; MG/1
1 TABLET ORAL 2 TIMES DAILY PRN
Qty: 30 TABLET | Refills: 0 | Status: SHIPPED | OUTPATIENT
Start: 2022-12-12 | End: 2023-01-05 | Stop reason: SDUPTHER

## 2022-12-12 RX ORDER — TRIAMCINOLONE ACETONIDE 40 MG/ML
80 INJECTION, SUSPENSION INTRA-ARTICULAR; INTRAMUSCULAR ONCE
Status: COMPLETED | OUTPATIENT
Start: 2022-12-12 | End: 2022-12-12

## 2022-12-12 RX ADMIN — TRIAMCINOLONE ACETONIDE 80 MG: 40 INJECTION, SUSPENSION INTRA-ARTICULAR; INTRAMUSCULAR at 16:04

## 2022-12-12 NOTE — PROGRESS NOTES
"Chief Complaint   Patient presents with   • hip pain         Subjective   Denisa Michelle is a 62 y.o. female who presents today for left hip pain.     HPI   She has had left hip pain x 2-3 months. She has seen Dr. Irwin and he injected her hip with diagnosis of bursitis on 11/29/22. He noted she has pathology on her x-ray and took l-spine x-rays and wants her to see a spinal surgeon.   She notices left hip pain especially in the mornings and when walking on uneven surfaces. She is requesting a steroid injection.    Allergies   Allergen Reactions   • Clindamycin/Lincomycin Hives and Rash         OBJECTIVE:  Vitals:    12/12/22 1543   BP: (!) 83/56   BP Location: Right arm   Patient Position: Sitting   Cuff Size: Adult   Pulse: 83   Temp: 97.4 °F (36.3 °C)   SpO2: 98%   Weight: 69.9 kg (154 lb 3.2 oz)   Height: 158.8 cm (62.5\")     Physical Exam  Vitals and nursing note reviewed.   Constitutional:       Appearance: Normal appearance.   Cardiovascular:      Rate and Rhythm: Normal rate and regular rhythm.      Pulses: Normal pulses.      Heart sounds: Normal heart sounds.   Pulmonary:      Effort: Pulmonary effort is normal.      Breath sounds: Normal breath sounds.   Musculoskeletal:      Left hip: Tenderness and crepitus present. Decreased range of motion. Decreased strength.   Skin:     General: Skin is warm and dry.   Neurological:      General: No focal deficit present.      Mental Status: She is alert and oriented to person, place, and time.   Psychiatric:         Mood and Affect: Mood normal.         Behavior: Behavior normal.         Thought Content: Thought content normal.         Judgment: Judgment normal.                    ASSESSMENT/ PLAN:    Diagnoses and all orders for this visit:    1. Left hip pain (Primary)  -     triamcinolone acetonide (KENALOG-40) injection 80 mg  -     HYDROcodone-acetaminophen (Norco) 5-325 MG per tablet; Take 1 tablet by mouth 2 (Two) Times a Day As Needed for Moderate Pain.  " Dispense: 30 tablet; Refill: 0      Procedures     Management Plan:     An After Visit Summary was printed and given to the patient at discharge.    Follow-up: Return if symptoms worsen or fail to improve.    I spent 23 minutes caring for Denisa on this date of service. This time includes time spent by me in the following activities: preparing for the visit, reviewing tests, obtaining and/or reviewing a separately obtained history, performing a medically appropriate examination and/or evaluation, ordering medications, tests, or procedures and documenting information in the medical record.      NEW Trivedi 12/12/2022 16:09 CST  This note was electronically signed.

## 2023-01-05 ENCOUNTER — OFFICE VISIT (OUTPATIENT)
Dept: FAMILY MEDICINE CLINIC | Facility: CLINIC | Age: 63
End: 2023-01-05
Payer: MEDICARE

## 2023-01-05 VITALS
HEIGHT: 63 IN | HEART RATE: 73 BPM | SYSTOLIC BLOOD PRESSURE: 118 MMHG | OXYGEN SATURATION: 97 % | DIASTOLIC BLOOD PRESSURE: 74 MMHG | BODY MASS INDEX: 28.17 KG/M2 | WEIGHT: 159 LBS | RESPIRATION RATE: 18 BRPM | TEMPERATURE: 97.7 F

## 2023-01-05 DIAGNOSIS — M25.552 LEFT HIP PAIN: ICD-10-CM

## 2023-01-05 DIAGNOSIS — S51.812A SKIN TEAR OF LEFT FOREARM WITHOUT COMPLICATION, INITIAL ENCOUNTER: Primary | ICD-10-CM

## 2023-01-05 DIAGNOSIS — M25.552 PAIN OF BOTH HIP JOINTS: ICD-10-CM

## 2023-01-05 DIAGNOSIS — M25.551 PAIN OF BOTH HIP JOINTS: ICD-10-CM

## 2023-01-05 PROCEDURE — 99214 OFFICE O/P EST MOD 30 MIN: CPT | Performed by: NURSE PRACTITIONER

## 2023-01-05 RX ORDER — CELECOXIB 200 MG/1
200 CAPSULE ORAL 2 TIMES DAILY
Qty: 180 CAPSULE | Refills: 1 | Status: SHIPPED | OUTPATIENT
Start: 2023-01-05

## 2023-01-05 RX ORDER — HYDROCODONE BITARTRATE AND ACETAMINOPHEN 5; 325 MG/1; MG/1
1 TABLET ORAL 2 TIMES DAILY PRN
Qty: 30 TABLET | Refills: 0 | Status: SHIPPED | OUTPATIENT
Start: 2023-01-05 | End: 2023-02-06

## 2023-01-05 NOTE — PROGRESS NOTES
Chief Complaint  Hip Pain, Rib Pain, and Abrasion    Subjective        Denisa Michelle presents to [facility]  History of Present Illness  SKIN TEAR: Patient presents with a skin tear to the left forearm where her dog's dew claw caught her skin and tore it. This happened 2 days ago.  She has been keeping it clean and covered. It is very painful.  It continues to bleed daily when the dressing is removed.  Patient has been using a little neosporin and covering with a gauze.  PAIN:  She continues to have left hip pain.  Ortho felt that it was bursitis but did not have a good response to steroid injection.  She does see ortho for this pain.  Rib pain on the left is related to a fall in the bathroom striking the left ribs on the bathtub.  It is improving but has significant bruising.  It does not hurt to take a deep breath.    Objective   Vital Signs:  /74 (BP Location: Right arm, Patient Position: Sitting, Cuff Size: Adult)   Pulse 73   Temp 97.7 °F (36.5 °C) (Temporal)   Resp 18   Ht 158.8 cm (62.5\")   Wt 72.1 kg (159 lb)   SpO2 97%   BMI 28.62 kg/m²   Estimated body mass index is 28.62 kg/m² as calculated from the following:    Height as of this encounter: 158.8 cm (62.5\").    Weight as of this encounter: 72.1 kg (159 lb).      Physical Exam  Vitals and nursing note reviewed.   Constitutional:       General: She is not in acute distress.     Appearance: Normal appearance. She is not ill-appearing.   HENT:      Head: Normocephalic and atraumatic.   Cardiovascular:      Rate and Rhythm: Normal rate and regular rhythm.      Heart sounds: Normal heart sounds.   Pulmonary:      Effort: Pulmonary effort is normal.      Breath sounds: Normal breath sounds.   Musculoskeletal:         General: Tenderness present.      Comments: Point tenderness to the left hip.  The left ribs are mildly tender to deep palpation.   Skin:     General: Skin is warm and dry.      Findings: Ecchymosis  and laceration present.             Comments: 4 cm x 2.7 cm linear skin tear of the left forearm with bleeding and healthy tissue exposure.  No purulence.  No erythema surrounding the defect.  Area of resolving ecchymosis beneath the left axilla.   Neurological:      Mental Status: She is alert.        Result Review :                Assessment and Plan   Diagnoses and all orders for this visit:    1. Skin tear of left forearm without complication, initial encounter (Primary)  -     silver sulfadiazine (Silvadene) 1 % cream; Apply 1 application topically to the appropriate area as directed 2 (Two) Times a Day.  Dispense: 50 g; Refill: 1    2. Left hip pain  -     HYDROcodone-acetaminophen (Norco) 5-325 MG per tablet; Take 1 tablet by mouth 2 (Two) Times a Day As Needed for Moderate Pain.  Dispense: 30 tablet; Refill: 0    3. Pain of both hip joints  -     celecoxib (CeleBREX) 200 MG capsule; Take 1 capsule by mouth 2 (Two) Times a Day.  Dispense: 180 capsule; Refill: 1    If patient requires any further refill of hydrocodone, she will need to complete a UDS and controlled substance agreement. DES is reviewed.  Patient instructed to shower daily, place silvadene on wound bed and cover with non-stick dressing.  She agrees to call should purulence or erythema or increased pain be present.         Follow Up   Return if symptoms worsen or fail to improve.  Patient was given instructions and counseling regarding her condition or for health maintenance advice. Please see specific information pulled into the AVS if appropriate.     NEW Mcgrath  This note is electronically signed.

## 2023-01-13 DIAGNOSIS — J30.2 SEASONAL ALLERGIES: ICD-10-CM

## 2023-01-13 RX ORDER — LORATADINE, PSEUDOEPHEDRINE SULFATE 5; 120 MG/1; MG/1
1 TABLET, FILM COATED, EXTENDED RELEASE ORAL DAILY
Qty: 20 TABLET | Refills: 6 | Status: SHIPPED | OUTPATIENT
Start: 2023-01-13 | End: 2023-02-23

## 2023-01-13 NOTE — TELEPHONE ENCOUNTER
Rx Refill Note  Requested Prescriptions     Pending Prescriptions Disp Refills   • SM Loratadine D 12HR 5-120 MG per 12 hr tablet [Pharmacy Med Name: SM LORATADINE D 12HR 5-120MG TB12] 20 tablet 6     Sig: TAKE ONE TABLET BY MOUTH TWICE A DAY      Last office visit with prescribing clinician: 12/12/2022   Last telemedicine visit with prescribing clinician: Visit date not found   Next office visit with prescribing clinician: Visit date not found       Carmelina Conway MA  01/13/23, 07:25 CST

## 2023-01-23 ENCOUNTER — OFFICE VISIT (OUTPATIENT)
Dept: FAMILY MEDICINE CLINIC | Facility: CLINIC | Age: 63
End: 2023-01-23
Payer: MEDICARE

## 2023-01-23 VITALS
OXYGEN SATURATION: 98 % | SYSTOLIC BLOOD PRESSURE: 100 MMHG | HEART RATE: 72 BPM | BODY MASS INDEX: 28.84 KG/M2 | DIASTOLIC BLOOD PRESSURE: 67 MMHG | WEIGHT: 162.8 LBS | HEIGHT: 63 IN | TEMPERATURE: 98.3 F

## 2023-01-23 DIAGNOSIS — Z79.899 ENCOUNTER FOR LONG-TERM (CURRENT) USE OF HIGH-RISK MEDICATION: ICD-10-CM

## 2023-01-23 DIAGNOSIS — M25.552 LEFT HIP PAIN: Primary | ICD-10-CM

## 2023-01-23 PROCEDURE — 96372 THER/PROPH/DIAG INJ SC/IM: CPT | Performed by: NURSE PRACTITIONER

## 2023-01-23 PROCEDURE — 99213 OFFICE O/P EST LOW 20 MIN: CPT | Performed by: NURSE PRACTITIONER

## 2023-01-23 RX ORDER — HYDROCODONE BITARTRATE AND ACETAMINOPHEN 7.5; 325 MG/1; MG/1
1 TABLET ORAL EVERY 6 HOURS PRN
Qty: 30 TABLET | Refills: 0 | Status: SHIPPED | OUTPATIENT
Start: 2023-01-23 | End: 2023-02-06 | Stop reason: SDUPTHER

## 2023-01-23 RX ORDER — TRIAMCINOLONE ACETONIDE 40 MG/ML
80 INJECTION, SUSPENSION INTRA-ARTICULAR; INTRAMUSCULAR ONCE
Status: COMPLETED | OUTPATIENT
Start: 2023-01-23 | End: 2023-01-23

## 2023-01-23 RX ADMIN — TRIAMCINOLONE ACETONIDE 80 MG: 40 INJECTION, SUSPENSION INTRA-ARTICULAR; INTRAMUSCULAR at 11:28

## 2023-01-23 NOTE — PROGRESS NOTES
"Chief Complaint   Patient presents with   • hip pain         Subjective   Denisa Michelle is a 62 y.o. female who presents today for left hip pain.     HPI   She had the left hip bursa injected with a steroid in November 2022. Since then, there has been no improvement of pain. She has difficultly ambulating and walking her dog. On 12-29-22, she saw a spine doctor and he gave her an IM injection of betamethasone and tramadol. She is here today for worsening left hip pain. She states Norco 5 does not help as much as 7.5. She can return for another hip injection at the [facility] in 3 months.   DES reviewed. Contract and UDS done.      Allergies   Allergen Reactions   • Clindamycin/Lincomycin Hives and Rash         OBJECTIVE:  Vitals:    01/23/23 1023   BP: 100/67   BP Location: Right arm   Patient Position: Sitting   Cuff Size: Adult   Pulse: 72   Temp: 98.3 °F (36.8 °C)   SpO2: 98%   Weight: 73.8 kg (162 lb 12.8 oz)   Height: 158.8 cm (62.5\")     Physical Exam  Vitals and nursing note reviewed.   Constitutional:       Appearance: Normal appearance.   Cardiovascular:      Rate and Rhythm: Normal rate and regular rhythm.      Pulses: Normal pulses.      Heart sounds: Normal heart sounds.   Pulmonary:      Effort: Pulmonary effort is normal.      Breath sounds: Normal breath sounds.   Musculoskeletal:        Legs:       Comments: Pain location.    Skin:     General: Skin is warm and dry.   Neurological:      General: No focal deficit present.      Mental Status: She is alert and oriented to person, place, and time.   Psychiatric:         Mood and Affect: Mood normal.         Behavior: Behavior normal.         Thought Content: Thought content normal.         Judgment: Judgment normal.                    ASSESSMENT/ PLAN:    Diagnoses and all orders for this visit:    1. Left hip pain (Primary)  -     triamcinolone acetonide (KENALOG-40) injection 80 mg  -     HYDROcodone-acetaminophen (Norco) 7.5-325 MG per " tablet; Take 1 tablet by mouth Every 6 (Six) Hours As Needed for Moderate Pain.  Dispense: 30 tablet; Refill: 0  -     Compliance Drug Analysis, Ur - Urine, Clean Catch; Future  -     Compliance Drug Analysis, Ur - Urine, Clean Catch    2. Encounter for long-term (current) use of high-risk medication  -     Compliance Drug Analysis, Ur - Urine, Clean Catch; Future  -     Compliance Drug Analysis, Ur - Urine, Clean Catch      Procedures     Management Plan:     An After Visit Summary was printed and given to the patient at discharge.    Follow-up: Return in about 4 months (around 5/23/2023) for ANNUAL PHYSICAL WITH LABS PRIOR.         Shelli Da Silva, NEW 1/23/2023 11:19 CST  This note was electronically signed.

## 2023-02-01 LAB — DRUGS UR: NORMAL

## 2023-02-06 ENCOUNTER — TELEPHONE (OUTPATIENT)
Dept: FAMILY MEDICINE CLINIC | Facility: CLINIC | Age: 63
End: 2023-02-06

## 2023-02-06 ENCOUNTER — OFFICE VISIT (OUTPATIENT)
Dept: FAMILY MEDICINE CLINIC | Facility: CLINIC | Age: 63
End: 2023-02-06
Payer: MEDICARE

## 2023-02-06 VITALS
HEART RATE: 63 BPM | OXYGEN SATURATION: 98 % | WEIGHT: 162 LBS | SYSTOLIC BLOOD PRESSURE: 119 MMHG | DIASTOLIC BLOOD PRESSURE: 72 MMHG | TEMPERATURE: 98.2 F | BODY MASS INDEX: 28.7 KG/M2 | HEIGHT: 63 IN

## 2023-02-06 DIAGNOSIS — M25.552 LEFT HIP PAIN: ICD-10-CM

## 2023-02-06 DIAGNOSIS — R07.89 LEFT-SIDED CHEST WALL PAIN: ICD-10-CM

## 2023-02-06 DIAGNOSIS — M54.6 ACUTE LEFT-SIDED THORACIC BACK PAIN: ICD-10-CM

## 2023-02-06 DIAGNOSIS — M54.6 ACUTE LEFT-SIDED THORACIC BACK PAIN: Primary | ICD-10-CM

## 2023-02-06 DIAGNOSIS — M25.512 ACUTE PAIN OF LEFT SHOULDER: ICD-10-CM

## 2023-02-06 PROCEDURE — 99214 OFFICE O/P EST MOD 30 MIN: CPT | Performed by: NURSE PRACTITIONER

## 2023-02-06 RX ORDER — HYDROCODONE BITARTRATE AND ACETAMINOPHEN 7.5; 325 MG/1; MG/1
1 TABLET ORAL EVERY 6 HOURS PRN
Qty: 30 TABLET | Refills: 0 | Status: SHIPPED | OUTPATIENT
Start: 2023-02-06 | End: 2023-02-14 | Stop reason: SDUPTHER

## 2023-02-06 NOTE — PROGRESS NOTES
"Chief Complaint   Patient presents with   • feel on ice      Pain on left side back ribs and groin pain, shoulder pain. Lack of movement   Fell Thursday           Subjective   Denisa Michelle is a 62 y.o. female who presents today for pain after falling on the ice.     HPI   Thursday afternoon she was walking her dog and she hit a patch of ice and landed on a concrete slab. She has bilateral hip pain, groin pain, left rib pain up to the axilla, and upper back pain around the left scapula area. She is having trouble using her left arm. She is also having trouble driving. She is ambulating slowly. She appears in pain.    Allergies   Allergen Reactions   • Clindamycin/Lincomycin Hives and Rash         OBJECTIVE:  Vitals:    02/06/23 0914   BP: 119/72   BP Location: Right arm   Patient Position: Sitting   Cuff Size: Adult   Pulse: 63   Temp: 98.2 °F (36.8 °C)   SpO2: 98%   Weight: 73.5 kg (162 lb)   Height: 158.8 cm (62.5\")     Physical Exam  Constitutional:       Comments: She appears in pain.   Chest:   Breasts:     Left: Tenderness present.      Comments: Tenderness to posterior ribs on the left.   Musculoskeletal:      Left shoulder: Tenderness present. Decreased range of motion.      Thoracic back: Tenderness present. Decreased range of motion.      Lumbar back: Tenderness present. Decreased range of motion.      Right hip: Tenderness present. Decreased range of motion.      Left hip: Tenderness present. Decreased range of motion.   Neurological:      Mental Status: She is alert.                    ASSESSMENT/ PLAN:    Diagnoses and all orders for this visit:    1. Acute left-sided thoracic back pain (Primary)  -     HYDROcodone-acetaminophen (Norco) 7.5-325 MG per tablet; Take 1 tablet by mouth Every 6 (Six) Hours As Needed for Moderate Pain.  Dispense: 30 tablet; Refill: 0  -     XR Chest PA & Lateral; Future    2. Left hip pain  -     HYDROcodone-acetaminophen (Norco) 7.5-325 MG per tablet; Take 1 tablet by mouth " Every 6 (Six) Hours As Needed for Moderate Pain.  Dispense: 30 tablet; Refill: 0    3. Left-sided chest wall pain  -     HYDROcodone-acetaminophen (Norco) 7.5-325 MG per tablet; Take 1 tablet by mouth Every 6 (Six) Hours As Needed for Moderate Pain.  Dispense: 30 tablet; Refill: 0  -     XR Chest PA & Lateral; Future    4. Acute pain of left shoulder  -     HYDROcodone-acetaminophen (Norco) 7.5-325 MG per tablet; Take 1 tablet by mouth Every 6 (Six) Hours As Needed for Moderate Pain.  Dispense: 30 tablet; Refill: 0  -     XR Chest PA & Lateral; Future      Procedures     Management Plan:     An After Visit Summary was printed and given to the patient at discharge.    Follow-up: Return if symptoms worsen or fail to improve, for Will call with x-ray results.         Shelli Da Silva, APRN 2/6/2023 10:43 CST  This note was electronically signed.

## 2023-02-14 ENCOUNTER — OFFICE VISIT (OUTPATIENT)
Dept: FAMILY MEDICINE CLINIC | Facility: CLINIC | Age: 63
End: 2023-02-14
Payer: MEDICARE

## 2023-02-14 VITALS
WEIGHT: 161 LBS | BODY MASS INDEX: 28.53 KG/M2 | DIASTOLIC BLOOD PRESSURE: 50 MMHG | SYSTOLIC BLOOD PRESSURE: 90 MMHG | HEIGHT: 63 IN | TEMPERATURE: 97.5 F | HEART RATE: 114 BPM

## 2023-02-14 DIAGNOSIS — R43.2 DYSGEUSIA: Primary | ICD-10-CM

## 2023-02-14 DIAGNOSIS — M54.6 ACUTE LEFT-SIDED THORACIC BACK PAIN: ICD-10-CM

## 2023-02-14 DIAGNOSIS — M25.512 ACUTE PAIN OF LEFT SHOULDER: ICD-10-CM

## 2023-02-14 DIAGNOSIS — M25.552 LEFT HIP PAIN: ICD-10-CM

## 2023-02-14 DIAGNOSIS — R43.9 OLFACTORY IMPAIRMENT: ICD-10-CM

## 2023-02-14 DIAGNOSIS — R07.89 LEFT-SIDED CHEST WALL PAIN: ICD-10-CM

## 2023-02-14 DIAGNOSIS — K59.1 FUNCTIONAL DIARRHEA: ICD-10-CM

## 2023-02-14 PROCEDURE — 99214 OFFICE O/P EST MOD 30 MIN: CPT | Performed by: NURSE PRACTITIONER

## 2023-02-14 RX ORDER — HYDROCODONE BITARTRATE AND ACETAMINOPHEN 7.5; 325 MG/1; MG/1
1 TABLET ORAL EVERY 6 HOURS PRN
Qty: 30 TABLET | Refills: 0 | Status: SHIPPED | OUTPATIENT
Start: 2023-02-14 | End: 2023-02-23 | Stop reason: DRUGHIGH

## 2023-02-14 RX ORDER — METAXALONE 800 MG/1
800 TABLET ORAL 3 TIMES DAILY PRN
Qty: 90 TABLET | Refills: 3 | Status: SHIPPED | OUTPATIENT
Start: 2023-02-14

## 2023-02-14 RX ORDER — DIPHENOXYLATE HYDROCHLORIDE AND ATROPINE SULFATE 2.5; .025 MG/1; MG/1
1 TABLET ORAL 4 TIMES DAILY PRN
Qty: 40 TABLET | Refills: 0 | Status: SHIPPED | OUTPATIENT
Start: 2023-02-14

## 2023-02-14 NOTE — PROGRESS NOTES
"Chief Complaint   Patient presents with   • Fall   • Diarrhea        Subjective   Denisa Michelle is a 62 y.o. female who presents today for continued pain after fall on the ice and diarrhea.    HPI   She is still experiencing left chest wall pain that radiates to her back from a fall on the ice on 2/2/23. She states the pain has gotten somewhat better but has moved down to underneath her breasts. She thinks that she might have reinjured this area while walking her dog. Her left shoulder is also still painful.  She has an ongoing problem with a bad taste in her mouth. This has been occurring on and off since 2019. She has tried mouthwashes and eating different foods but nothing seems to help. She has also noticed that her sense of smell has become enhanced. She cannot wear perfume. She cannot wear deodorant.   She continues to have left hip pain and has an upcoming appointment with the orthopedist to get another steroid injection.   She has had diarrhea that started 3-4 days ago.     Allergies   Allergen Reactions   • Clindamycin/Lincomycin Hives and Rash         OBJECTIVE:  Vitals:    02/14/23 0935 02/14/23 1019   BP: 171/71 90/50   BP Location: Right arm Right arm   Patient Position: Sitting Sitting   Cuff Size: Adult Adult   Pulse: 114    Temp: 97.5 °F (36.4 °C)    Weight: 73 kg (161 lb)    Height: 158.8 cm (62.5\")      Physical Exam  Vitals and nursing note reviewed.   Constitutional:       Appearance: Normal appearance.   Cardiovascular:      Rate and Rhythm: Normal rate and regular rhythm.      Pulses: Normal pulses.      Heart sounds: Normal heart sounds.   Pulmonary:      Effort: Pulmonary effort is normal.      Breath sounds: Normal breath sounds.   Musculoskeletal:      Left shoulder: Tenderness present. Decreased range of motion.      Left hip: Tenderness and crepitus present. Decreased range of motion. Decreased strength.      Comments: Pain across chest from midline under left breast into left back. " Tender to palpation.    Skin:     General: Skin is warm and dry.   Neurological:      General: No focal deficit present.      Mental Status: She is alert and oriented to person, place, and time.   Psychiatric:         Mood and Affect: Mood normal.         Behavior: Behavior normal.         Thought Content: Thought content normal.         Judgment: Judgment normal.                    ASSESSMENT/ PLAN:    Diagnoses and all orders for this visit:    1. Dysgeusia (Primary)  -     CBC w AUTO Differential  -     Comprehensive metabolic panel  -     TERRENCE  -     Sedimentation rate, automated  -     Vitamin B12  -     C-reactive protein    2. Olfactory impairment  -     CBC w AUTO Differential  -     Comprehensive metabolic panel  -     TERRENCE  -     Sedimentation rate, automated  -     Vitamin B12  -     C-reactive protein    3. Left hip pain  -     HYDROcodone-acetaminophen (Norco) 7.5-325 MG per tablet; Take 1 tablet by mouth Every 6 (Six) Hours As Needed for Moderate Pain.  Dispense: 30 tablet; Refill: 0    4. Acute pain of left shoulder  -     HYDROcodone-acetaminophen (Norco) 7.5-325 MG per tablet; Take 1 tablet by mouth Every 6 (Six) Hours As Needed for Moderate Pain.  Dispense: 30 tablet; Refill: 0    5. Acute left-sided thoracic back pain  -     metaxalone (Skelaxin) 800 MG tablet; Take 1 tablet by mouth 3 (Three) Times a Day As Needed for Muscle Spasms.  Dispense: 90 tablet; Refill: 3  -     HYDROcodone-acetaminophen (Norco) 7.5-325 MG per tablet; Take 1 tablet by mouth Every 6 (Six) Hours As Needed for Moderate Pain.  Dispense: 30 tablet; Refill: 0    6. Left-sided chest wall pain  -     metaxalone (Skelaxin) 800 MG tablet; Take 1 tablet by mouth 3 (Three) Times a Day As Needed for Muscle Spasms.  Dispense: 90 tablet; Refill: 3  -     HYDROcodone-acetaminophen (Norco) 7.5-325 MG per tablet; Take 1 tablet by mouth Every 6 (Six) Hours As Needed for Moderate Pain.  Dispense: 30 tablet; Refill: 0    7. Functional  diarrhea  -     diphenoxylate-atropine (Lomotil) 2.5-0.025 MG per tablet; Take 1 tablet by mouth 4 (Four) Times a Day As Needed for Diarrhea.  Dispense: 40 tablet; Refill: 0      Procedures     Management Plan:     An After Visit Summary was printed and given to the patient at discharge.    Follow-up: Return for will call with lab results.         Shelli Da Silva, NEW 2/14/2023 10:43 CST  This note was electronically signed.

## 2023-02-15 LAB
ALBUMIN SERPL-MCNC: 3.9 G/DL (ref 3.8–4.8)
ALBUMIN/GLOB SERPL: 2.6 {RATIO} (ref 1.2–2.2)
ALP SERPL-CCNC: 93 IU/L (ref 44–121)
ALT SERPL-CCNC: 15 IU/L (ref 0–32)
ANA SER QL: NEGATIVE
AST SERPL-CCNC: 16 IU/L (ref 0–40)
BASOPHILS # BLD AUTO: 0 X10E3/UL (ref 0–0.2)
BASOPHILS NFR BLD AUTO: 0 %
BILIRUB SERPL-MCNC: 0.6 MG/DL (ref 0–1.2)
BUN SERPL-MCNC: 12 MG/DL (ref 8–27)
BUN/CREAT SERPL: 10 (ref 12–28)
CALCIUM SERPL-MCNC: 8.9 MG/DL (ref 8.7–10.3)
CHLORIDE SERPL-SCNC: 97 MMOL/L (ref 96–106)
CO2 SERPL-SCNC: 18 MMOL/L (ref 20–29)
CREAT SERPL-MCNC: 1.25 MG/DL (ref 0.57–1)
CRP SERPL-MCNC: <1 MG/L (ref 0–10)
EGFRCR SERPLBLD CKD-EPI 2021: 48 ML/MIN/1.73
EOSINOPHIL # BLD AUTO: 0 X10E3/UL (ref 0–0.4)
EOSINOPHIL NFR BLD AUTO: 0 %
ERYTHROCYTE [DISTWIDTH] IN BLOOD BY AUTOMATED COUNT: 13.8 % (ref 11.7–15.4)
ERYTHROCYTE [SEDIMENTATION RATE] IN BLOOD BY WESTERGREN METHOD: 5 MM/HR (ref 0–40)
GLOBULIN SER CALC-MCNC: 1.5 G/DL (ref 1.5–4.5)
GLUCOSE SERPL-MCNC: 144 MG/DL (ref 70–99)
HCT VFR BLD AUTO: 41.2 % (ref 34–46.6)
HGB BLD-MCNC: 14.3 G/DL (ref 11.1–15.9)
IMM GRANULOCYTES # BLD AUTO: 0.1 X10E3/UL (ref 0–0.1)
IMM GRANULOCYTES NFR BLD AUTO: 1 %
LYMPHOCYTES # BLD AUTO: 0.9 X10E3/UL (ref 0.7–3.1)
LYMPHOCYTES NFR BLD AUTO: 12 %
MCH RBC QN AUTO: 33.3 PG (ref 26.6–33)
MCHC RBC AUTO-ENTMCNC: 34.7 G/DL (ref 31.5–35.7)
MCV RBC AUTO: 96 FL (ref 79–97)
MONOCYTES # BLD AUTO: 0.5 X10E3/UL (ref 0.1–0.9)
MONOCYTES NFR BLD AUTO: 7 %
NEUTROPHILS # BLD AUTO: 5.7 X10E3/UL (ref 1.4–7)
NEUTROPHILS NFR BLD AUTO: 80 %
PLATELET # BLD AUTO: 259 X10E3/UL (ref 150–450)
POTASSIUM SERPL-SCNC: 4.3 MMOL/L (ref 3.5–5.2)
PROT SERPL-MCNC: 5.4 G/DL (ref 6–8.5)
RBC # BLD AUTO: 4.3 X10E6/UL (ref 3.77–5.28)
SODIUM SERPL-SCNC: 133 MMOL/L (ref 134–144)
VIT B12 SERPL-MCNC: 129 PG/ML (ref 232–1245)
WBC # BLD AUTO: 7.2 X10E3/UL (ref 3.4–10.8)

## 2023-02-16 DIAGNOSIS — E53.8 B12 DEFICIENCY: Primary | ICD-10-CM

## 2023-02-16 DIAGNOSIS — R94.4 DECREASED GFR: ICD-10-CM

## 2023-02-16 DIAGNOSIS — R79.89 BLOOD CREATININE INCREASED COMPARED WITH PRIOR MEASUREMENT: ICD-10-CM

## 2023-02-16 RX ORDER — CYANOCOBALAMIN 1000 UG/ML
1000 INJECTION, SOLUTION INTRAMUSCULAR; SUBCUTANEOUS
Status: SHIPPED | OUTPATIENT
Start: 2023-02-16

## 2023-02-17 ENCOUNTER — CLINICAL SUPPORT (OUTPATIENT)
Dept: FAMILY MEDICINE CLINIC | Facility: CLINIC | Age: 63
End: 2023-02-17
Payer: MEDICARE

## 2023-02-17 PROCEDURE — 96372 THER/PROPH/DIAG INJ SC/IM: CPT | Performed by: NURSE PRACTITIONER

## 2023-02-17 RX ADMIN — CYANOCOBALAMIN 1000 MCG: 1000 INJECTION, SOLUTION INTRAMUSCULAR; SUBCUTANEOUS at 10:33

## 2023-02-23 ENCOUNTER — OFFICE VISIT (OUTPATIENT)
Dept: FAMILY MEDICINE CLINIC | Facility: CLINIC | Age: 63
End: 2023-02-23
Payer: MEDICARE

## 2023-02-23 VITALS
BODY MASS INDEX: 28.7 KG/M2 | DIASTOLIC BLOOD PRESSURE: 74 MMHG | HEIGHT: 63 IN | OXYGEN SATURATION: 98 % | HEART RATE: 105 BPM | SYSTOLIC BLOOD PRESSURE: 123 MMHG | TEMPERATURE: 97 F | WEIGHT: 162 LBS

## 2023-02-23 DIAGNOSIS — J30.2 SEASONAL ALLERGIES: ICD-10-CM

## 2023-02-23 DIAGNOSIS — M25.552 LEFT HIP PAIN: Primary | ICD-10-CM

## 2023-02-23 LAB
HBA1C MFR BLD: 5.4 % (ref 4.8–5.6)
WRITTEN AUTHORIZATION: NORMAL

## 2023-02-23 PROCEDURE — 99213 OFFICE O/P EST LOW 20 MIN: CPT | Performed by: NURSE PRACTITIONER

## 2023-02-23 PROCEDURE — 3044F HG A1C LEVEL LT 7.0%: CPT | Performed by: NURSE PRACTITIONER

## 2023-02-23 RX ORDER — LORATADINE AND PSEUDOEPHEDRINE SULFATE 10; 240 MG/1; MG/1
1 TABLET, EXTENDED RELEASE ORAL DAILY
Qty: 15 TABLET | Refills: 5 | Status: SHIPPED | OUTPATIENT
Start: 2023-02-23

## 2023-02-23 RX ORDER — HYDROCODONE BITARTRATE AND ACETAMINOPHEN 10; 325 MG/1; MG/1
1 TABLET ORAL EVERY 6 HOURS PRN
Qty: 30 TABLET | Refills: 0 | Status: SHIPPED | OUTPATIENT
Start: 2023-02-23

## 2023-02-23 NOTE — PROGRESS NOTES
"Chief Complaint   Patient presents with   • Hip Pain     Hurting from injection         Subjective   Denisa Michelle is a 62 y.o. female who presents today for left hip pain and changing generic claritin to name brand.     HPI   She had a steroid injection in her left hip at [facility]. She states that since then the pain has been excruciating. She has had a previous injection and apparently it was as painful as today. She would like pain medication to get her through this time period while she is waiting for the steroid to start working. She is ambulating with a noticeable limp.   She has been taking generic claritin D for seasonal allergies. She states that it gives her a bad taste in her mouth and she is requesting the name brand.     Allergies   Allergen Reactions   • Clindamycin/Lincomycin Hives and Rash         OBJECTIVE:  Vitals:    02/23/23 0936   BP: 123/74   BP Location: Right arm   Patient Position: Sitting   Cuff Size: Adult   Pulse: 105   Temp: 97 °F (36.1 °C)   SpO2: 98%   Weight: 73.5 kg (162 lb)   Height: 158.8 cm (62.5\")     Physical Exam  Vitals and nursing note reviewed.   Constitutional:       Appearance: Normal appearance.   Cardiovascular:      Rate and Rhythm: Normal rate and regular rhythm.      Pulses: Normal pulses.      Heart sounds: Normal heart sounds.   Pulmonary:      Effort: Pulmonary effort is normal.      Breath sounds: Normal breath sounds.   Musculoskeletal:      Left hip: Tenderness present. Decreased range of motion. Decreased strength.   Skin:     General: Skin is warm and dry.   Neurological:      General: No focal deficit present.      Mental Status: She is alert and oriented to person, place, and time.   Psychiatric:         Mood and Affect: Mood normal.         Behavior: Behavior normal.         Thought Content: Thought content normal.         Judgment: Judgment normal.                    ASSESSMENT/ PLAN:    Diagnoses and all orders for this visit:    1. Left hip pain (Primary)  - "     HYDROcodone-acetaminophen (Norco)  MG per tablet; Take 1 tablet by mouth Every 6 (Six) Hours As Needed for Moderate Pain.  Dispense: 30 tablet; Refill: 0    2. Seasonal allergies  -     Claritin-D 24 Hour  MG per 24 hr tablet; Take 1 tablet by mouth Daily.  Dispense: 15 tablet; Refill: 5      Procedures     Management Plan:     An After Visit Summary was printed and given to the patient at discharge.    Follow-up: Return if symptoms worsen or fail to improve.         Shelli Da Silva, APRN 2/23/2023 10:04 CST  This note was electronically signed.

## 2023-03-02 ENCOUNTER — CLINICAL SUPPORT (OUTPATIENT)
Dept: FAMILY MEDICINE CLINIC | Facility: CLINIC | Age: 63
End: 2023-03-02
Payer: MEDICARE

## 2023-03-02 DIAGNOSIS — E53.8 B12 DEFICIENCY: ICD-10-CM

## 2023-03-02 PROCEDURE — 96372 THER/PROPH/DIAG INJ SC/IM: CPT | Performed by: NURSE PRACTITIONER

## 2023-03-02 RX ADMIN — CYANOCOBALAMIN 1000 MCG: 1000 INJECTION, SOLUTION INTRAMUSCULAR; SUBCUTANEOUS at 10:42

## 2023-03-14 ENCOUNTER — OFFICE VISIT (OUTPATIENT)
Dept: FAMILY MEDICINE CLINIC | Facility: CLINIC | Age: 63
End: 2023-03-14
Payer: MEDICARE

## 2023-03-14 VITALS
OXYGEN SATURATION: 98 % | WEIGHT: 166.6 LBS | DIASTOLIC BLOOD PRESSURE: 72 MMHG | BODY MASS INDEX: 29.52 KG/M2 | HEIGHT: 63 IN | TEMPERATURE: 97.9 F | HEART RATE: 84 BPM | RESPIRATION RATE: 20 BRPM | SYSTOLIC BLOOD PRESSURE: 124 MMHG

## 2023-03-14 DIAGNOSIS — Z23 NEED FOR TDAP VACCINATION: ICD-10-CM

## 2023-03-14 DIAGNOSIS — R19.7 DIARRHEA OF PRESUMED INFECTIOUS ORIGIN: ICD-10-CM

## 2023-03-14 DIAGNOSIS — Z12.11 SCREENING FOR COLON CANCER: ICD-10-CM

## 2023-03-14 DIAGNOSIS — S56.921A: Primary | ICD-10-CM

## 2023-03-14 PROCEDURE — 90715 TDAP VACCINE 7 YRS/> IM: CPT | Performed by: NURSE PRACTITIONER

## 2023-03-14 PROCEDURE — 3044F HG A1C LEVEL LT 7.0%: CPT | Performed by: NURSE PRACTITIONER

## 2023-03-14 PROCEDURE — 1160F RVW MEDS BY RX/DR IN RCRD: CPT | Performed by: NURSE PRACTITIONER

## 2023-03-14 PROCEDURE — 90471 IMMUNIZATION ADMIN: CPT | Performed by: NURSE PRACTITIONER

## 2023-03-14 PROCEDURE — 99213 OFFICE O/P EST LOW 20 MIN: CPT | Performed by: NURSE PRACTITIONER

## 2023-03-14 PROCEDURE — 12034 INTMD RPR S/TR/EXT 7.6-12.5: CPT | Performed by: NURSE PRACTITIONER

## 2023-03-14 PROCEDURE — 1159F MED LIST DOCD IN RCRD: CPT | Performed by: NURSE PRACTITIONER

## 2023-03-14 RX ORDER — METRONIDAZOLE 500 MG/1
500 TABLET ORAL 2 TIMES DAILY
Qty: 20 TABLET | Refills: 0 | Status: SHIPPED | OUTPATIENT
Start: 2023-03-14

## 2023-03-14 RX ORDER — DICYCLOMINE HYDROCHLORIDE 10 MG/1
10 CAPSULE ORAL
Qty: 120 CAPSULE | Refills: 5 | Status: SHIPPED | OUTPATIENT
Start: 2023-03-14

## 2023-03-14 RX ORDER — HYDROCODONE BITARTRATE AND ACETAMINOPHEN 7.5; 325 MG/1; MG/1
1 TABLET ORAL EVERY 4 HOURS PRN
Qty: 30 TABLET | Refills: 0 | Status: SHIPPED | OUTPATIENT
Start: 2023-03-14 | End: 2023-03-21 | Stop reason: SDUPTHER

## 2023-03-14 NOTE — PROGRESS NOTES
"Chief Complaint   Patient presents with   • Fall     Have fallen twice, hurt right arm had a laceration and upper back has been hurting     • Diarrhea     Has gotten worse states she is wearing diapers          Subjective   Denisa Michelle is a 62 y.o. female who presents today for laceration right forearm and diarrhea.    HPI   She was getting ready yesterday morning in her bathroom. She set her diet coke down and somehow fell scraping her right forearm down her vanity. She did not lose consciousness. She has a laceration below her right elbow approximately 8 cm in length. She is sore on her right side. She had a second fall the same day and also has pain on her left side.  She also complains of diarrhea that did get better with lomotil, but she stopped taking the lomotil due to a bad taste in her mouth. She states her stools are all watery and she has been having to wear depends because she cannot get to the bathroom on time. This has been occurring for the past 2-3 weeks.   She is in need of a cologuard.  Allergies   Allergen Reactions   • Clindamycin/Lincomycin Hives and Rash         OBJECTIVE:  Vitals:    03/14/23 0714   BP: 124/72   BP Location: Left arm   Patient Position: Sitting   Cuff Size: Adult   Pulse: 84   Resp: 20   Temp: 97.9 °F (36.6 °C)   TempSrc: Temporal   SpO2: 98%   Weight: 75.6 kg (166 lb 9.6 oz)   Height: 158.8 cm (62.52\")     Physical Exam  Abdominal:      General: Abdomen is flat. Bowel sounds are increased.      Palpations: Abdomen is soft.      Comments: No abdominal pain.    Skin:     Comments: Vertical laceration below right elbow with muscle exposure. The area is missing skin on both sides of the laceration. It is not currently bleeding.                     ASSESSMENT/ PLAN:    Diagnoses and all orders for this visit:    1. Laceration of muscle of right forearm (Primary)  -     HYDROcodone-acetaminophen (NORCO) 7.5-325 MG per tablet; Take 1 tablet by mouth Every 4 (Four) Hours As Needed " for Moderate Pain.  Dispense: 30 tablet; Refill: 0  -     Tdap Vaccine Greater Than or Equal To 8yo IM    2. Diarrhea of presumed infectious origin  -     dicyclomine (BENTYL) 10 MG capsule; Take 1 capsule by mouth 4 (Four) Times a Day Before Meals & at Bedtime.  Dispense: 120 capsule; Refill: 5  -     metroNIDAZOLE (Flagyl) 500 MG tablet; Take 1 tablet by mouth 2 (Two) Times a Day.  Dispense: 20 tablet; Refill: 0    3. Need for Tdap vaccination  -     Tdap Vaccine Greater Than or Equal To 8yo IM    4. Screening for colon cancer  -     Cologuard - Stool, Per Rectum; Future    Other orders  -     Laceration Repair      Laceration Repair    Date/Time: 3/14/2023 8:15 AM  Performed by: Shelli Da Silva APRN  Authorized by: Shelli Da Silva APRN   Body area: upper extremity (right forearm)  Laceration length: 8 cm  Foreign bodies: no foreign bodies  Tendon involvement: none  Nerve involvement: none  Vascular damage: no  Anesthesia: local infiltration    Anesthesia:  Local Anesthetic: lidocaine 2% with epinephrine  Anesthetic total: 9 mL    Sedation:  Patient sedated: no    Preparation: Patient was prepped and draped in the usual sterile fashion.  Irrigation solution: saline  Irrigation method: tap  Amount of cleaning: standard  Debridement: none  Degree of undermining: none  Skin closure: 4-0 nylon  Number of sutures: 17  Technique: horizontal mattress  Approximation: close  Approximation difficulty: simple  Dressing: non-adhesive packing strip and gauze roll  Patient tolerance: patient tolerated the procedure well with no immediate complications  Comments: Patient's right forearm prepped with lidocaine 2% with epi and betadine. Skin closure with locked horizontal mattress stitches #17. Pressure dressing applied. Patient instructed on signs and symptoms of infection. Suture removal in 1 week.            Management Plan:   We are going to try bentyl and flagyl to see if we can get her diarrhea to resolve.   An After  Visit Summary was printed and given to the patient at discharge.    Follow-up: Return if symptoms worsen or fail to improve.         Shelli Da Silva, NEW 3/14/2023 08:22 CDT  This note was electronically signed.           oral

## 2023-03-21 ENCOUNTER — CLINICAL SUPPORT (OUTPATIENT)
Dept: FAMILY MEDICINE CLINIC | Facility: CLINIC | Age: 63
End: 2023-03-21
Payer: MEDICARE

## 2023-03-21 DIAGNOSIS — S56.921A: Primary | ICD-10-CM

## 2023-03-21 RX ORDER — HYDROCODONE BITARTRATE AND ACETAMINOPHEN 7.5; 325 MG/1; MG/1
1 TABLET ORAL EVERY 4 HOURS PRN
Qty: 30 TABLET | Refills: 0 | Status: SHIPPED | OUTPATIENT
Start: 2023-03-21

## 2023-03-21 NOTE — PROGRESS NOTES
Patient presented to the office for stitch removal. Due to having a scab on the line of stitches, Provider advised to cut the two ends and patient to come back in one week for eval. Patient voiced understanding.

## 2023-03-29 ENCOUNTER — TELEPHONE (OUTPATIENT)
Dept: FAMILY MEDICINE CLINIC | Facility: CLINIC | Age: 63
End: 2023-03-29
Payer: MEDICARE

## 2023-03-29 ENCOUNTER — CLINICAL SUPPORT (OUTPATIENT)
Dept: FAMILY MEDICINE CLINIC | Facility: CLINIC | Age: 63
End: 2023-03-29
Payer: MEDICARE

## 2023-03-29 NOTE — PROGRESS NOTES
Patient presented to the office for suture removal of right forearm. All sutures removed, patient tolerated. No redness, minimal bleeding. NEW Manjarrez evaluated.

## 2023-03-29 NOTE — TELEPHONE ENCOUNTER
Called pt letting her know that the letter is ready for her at the , pt stated she will be by tomorrow to pick it up

## 2023-03-29 NOTE — TELEPHONE ENCOUNTER
Caller: Denisa Michelle    Relationship: Self    Best call back number: [phone]    What form or medical record are you requesting: NEEDS A WRITTEN EXCUSE FROM DOCTOR TO EXCUSE FROM [employer]. 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

## 2023-04-07 RX ORDER — ATORVASTATIN CALCIUM 40 MG/1
TABLET, FILM COATED ORAL
Qty: 90 TABLET | Refills: 3 | Status: SHIPPED | OUTPATIENT
Start: 2023-04-07

## 2023-04-07 NOTE — TELEPHONE ENCOUNTER
Rx Refill Note  Requested Prescriptions     Pending Prescriptions Disp Refills   • atorvastatin (LIPITOR) 40 MG tablet [Pharmacy Med Name: ATORVASTATIN CALCIUM 40MG TABS] 90 tablet 3     Sig: TAKE ONE TABLET BY MOUTH AT BEDTIME      Last office visit with prescribing clinician: 3/14/2023   Last telemedicine visit with prescribing clinician: Visit date not found   Next office visit with prescribing clinician: Visit date not found           Anjelica Valentin MA  04/07/23, 12:08 CDT

## 2023-04-27 ENCOUNTER — TRANSCRIBE ORDERS (OUTPATIENT)
Dept: ADMINISTRATIVE | Facility: HOSPITAL | Age: 63
End: 2023-04-27
Payer: MEDICARE

## 2023-04-27 DIAGNOSIS — M41.86 OTHER FORMS OF SCOLIOSIS, LUMBAR REGION: ICD-10-CM

## 2023-04-27 DIAGNOSIS — M51.36 DEGENERATION OF LUMBAR INTERVERTEBRAL DISC: Primary | ICD-10-CM

## 2023-05-04 ENCOUNTER — DOCUMENTATION (OUTPATIENT)
Dept: FAMILY MEDICINE CLINIC | Facility: CLINIC | Age: 63
End: 2023-05-04
Payer: MEDICARE

## 2023-05-11 ENCOUNTER — OFFICE VISIT (OUTPATIENT)
Dept: FAMILY MEDICINE CLINIC | Facility: CLINIC | Age: 63
End: 2023-05-11
Payer: MEDICARE

## 2023-05-11 VITALS
WEIGHT: 156 LBS | SYSTOLIC BLOOD PRESSURE: 108 MMHG | DIASTOLIC BLOOD PRESSURE: 62 MMHG | RESPIRATION RATE: 20 BRPM | TEMPERATURE: 97.7 F | HEART RATE: 82 BPM | HEIGHT: 63 IN | BODY MASS INDEX: 27.64 KG/M2 | OXYGEN SATURATION: 94 %

## 2023-05-11 DIAGNOSIS — R29.6 FREQUENT FALLS: Primary | ICD-10-CM

## 2023-05-11 DIAGNOSIS — R30.0 DYSURIA: ICD-10-CM

## 2023-05-11 DIAGNOSIS — M51.36 DDD (DEGENERATIVE DISC DISEASE), LUMBAR: ICD-10-CM

## 2023-05-11 DIAGNOSIS — M70.62 TROCHANTERIC BURSITIS OF LEFT HIP: ICD-10-CM

## 2023-05-11 DIAGNOSIS — K21.9 GERD WITHOUT ESOPHAGITIS: ICD-10-CM

## 2023-05-11 RX ORDER — OMEPRAZOLE 20 MG/1
20 CAPSULE, DELAYED RELEASE ORAL DAILY
Qty: 30 CAPSULE | Refills: 3 | Status: SHIPPED | OUTPATIENT
Start: 2023-05-11

## 2023-05-11 RX ORDER — CELECOXIB 200 MG/1
200 CAPSULE ORAL DAILY
Qty: 30 CAPSULE | Refills: 0 | Status: SHIPPED | OUTPATIENT
Start: 2023-05-11

## 2023-05-11 NOTE — PROGRESS NOTES
Chief Complaint  LEFT HIP PAIN and Back Pain (FALL LAST Thursday, ELBOW AND KNEE IS ALSO BEEN HURTING/)    Subjective        Denisa Michelle presents to [facility]    HPI    FU  -Pt is scheduled w/ me for chronic fu d/t pcp not being available.  She c/o L hip pain and indicates pain in area over greater trochanter.  She fell 1 week ago. Says her pant legs were too long and slipped on them. She landed on L side. She is ambulating w/o any more difficulty than normal. It appears falls are becoming more frequent. She was in this office for fall 2 mo ago in which she incurred large laceration of her arm.  It should be noted she has several sedating medications on her list including xanax, skelaxin, lyrica, and trazodone. She is being treated for hip pain d/t trochanteric bursitis by Dr. Irwin w/ injections and is also being seen by spine specialist at [facility] as well for OA-related lower back pain. She has MRI scheduled later this month. She is also in PT for this. There is discrepancy between our med list and pt's provided. She states she is not taking celebrex 400mg on our list and on her list there is relafen included however she states she is not taking this either. She also notes periodic dysuria, no frequency, urgency, f/c.     Past Medical History:   Diagnosis Date   • Breast cancer 2006   • Coronary artery disease 10/20/2019    Had SSS   • Fibromyalgia, primary    • Rhabdomyolysis      Past Surgical History:   Procedure Laterality Date   • BREAST LUMPECTOMY  2006   • CARDIAC CATHETERIZATION Left 05/12/2021    Procedure: Cardiac Catheterization/Vascular Study On CTA  at [facility] has nonobstructive 25-49% stenosis of the mid left anterior descending coronary artery;  Surgeon: Ranjith Stone MD;  Location:  [facility] CATH INVASIVE LOCATION;  Service: Cardiology;  Laterality: Left;   • CARDIAC ELECTROPHYSIOLOGY PROCEDURE N/A 12/18/2020    Procedure: Pacemaker DC new;   "Surgeon: Ranjith Stone MD;  Location:  [facility] CATH INVASIVE LOCATION;  Service: Cardiology;  Laterality: N/A;   • CHOLECYSTECTOMY       Social History     Socioeconomic History   • Marital status:    Tobacco Use   • Smoking status: Every Day     Packs/day: 1.00     Years: 30.00     Pack years: 30.00     Types: Cigarettes     Start date: 1/1/1992   • Smokeless tobacco: Current   Vaping Use   • Vaping Use: Never used   Substance and Sexual Activity   • Alcohol use: Never   • Drug use: Never   • Sexual activity: Defer     Comment:  had a baby in 1978       Objective   Vital Signs:  /62 (BP Location: Right arm, Patient Position: Sitting, Cuff Size: Adult)   Pulse 82   Temp 97.7 °F (36.5 °C) (Temporal)   Resp 20   Ht 158.8 cm (62.52\")   Wt 70.8 kg (156 lb)   SpO2 94%   BMI 28.06 kg/m²   Estimated body mass index is 28.06 kg/m² as calculated from the following:    Height as of this encounter: 158.8 cm (62.52\").    Weight as of this encounter: 70.8 kg (156 lb).             Physical Exam  Vitals reviewed.   Constitutional:       Appearance: Normal appearance.   HENT:      Head: Normocephalic.      Nose: Nose normal.      Mouth/Throat:      Mouth: Mucous membranes are moist.   Eyes:      Extraocular Movements: Extraocular movements intact.   Cardiovascular:      Rate and Rhythm: Normal rate and regular rhythm.      Heart sounds: Normal heart sounds.   Pulmonary:      Effort: Pulmonary effort is normal.      Breath sounds: Normal breath sounds.   Musculoskeletal:         General: Normal range of motion.      Cervical back: Normal range of motion.      Left hip: Tenderness present.        Legs:       Comments: Tenderness in area over L greater trochanter   Skin:     General: Skin is warm and dry.   Neurological:      General: No focal deficit present.      Mental Status: She is alert and oriented to person, place, and time.   Psychiatric:         Mood and Affect: Mood normal.         Behavior: " Behavior normal.        Result Review :               Assessment and Plan   Diagnoses and all orders for this visit:    1. Frequent falls (Primary)  -Discussed w/ pt she is at increased risk of falls d/t both physical conditions (hip/spine pathology) but also her medications. There seems to be some uncertainty/discrepancy on med lists so recommended she double check ours on avs with her list before next visit. I counseled her on the fact that she takes regularly several medications that can increase her risk for falls and suggested she discuss w/ psychiatrist if there are safer alternatives for anxiety/insomnia management. I recommended she consider walker. We also discussed home fall factors. She does have  handles in shower. There are, however rugs and pets at home. I cautioned her on this.     2. Trochanteric bursitis of left hip  -No signs suggestive of acute hip fracture. Pt currently being treated by ortho for this. Given recent fall recommended short course of NSAID. However cautioned on continued use given decline in GFR over last 6 mo. This will need to be monitored closely.  -     celecoxib (CeleBREX) 200 MG capsule; Take 1 capsule by mouth Daily.  Dispense: 30 capsule; Refill: 0    3. DDD (degenerative disc disease), lumbar  -MRI and further eval per spinal specialist    4. Dysuria  -     Urinalysis With Microscopic - Urine, Clean Catch; Future    5. GERD without esophagitis  -     omeprazole (priLOSEC) 20 MG capsule; Take 1 capsule by mouth Daily.  Dispense: 30 capsule; Refill: 3           I spent 40 minutes caring for Denisa on this date of service. This time includes time spent by me in the following activities:preparing for the visit, reviewing tests, performing a medically appropriate examination and/or evaluation , counseling and educating the patient/family/caregiver, ordering medications, tests, or procedures and documenting information in the medical record  EMR Dragon/Transcription  disclaimer:   Much of this encounter note is an electronic transcription/translation of spoken language to printed text. The electronic translation of spoken language may permit erroneous, or at times, nonsensical words or phrases to be inadvertently transcribed; although attempts have made to review the note for such errors, some may still exist. Please excuse any unrecognized transcription errors and contact us if the air is unintelligible or needs documented correction. Also, portions of this note have been copied forward, however, changed to reflect the most current clinical status of this patient.  Follow Up   Return in about 2 months (around 7/11/2023).  Patient was given instructions and counseling regarding her condition or for health maintenance advice. Please see specific information pulled into the AVS if appropriate.

## 2023-05-12 DIAGNOSIS — N30.01 ACUTE CYSTITIS WITH HEMATURIA: Primary | ICD-10-CM

## 2023-05-12 LAB
APPEARANCE UR: ABNORMAL
BACTERIA #/AREA URNS HPF: ABNORMAL /[HPF]
BILIRUB UR QL STRIP: NEGATIVE
CASTS URNS QL MICRO: ABNORMAL /LPF
COLOR UR: YELLOW
EPI CELLS #/AREA URNS HPF: ABNORMAL /HPF (ref 0–10)
GLUCOSE UR QL STRIP: NEGATIVE
HGB UR QL STRIP: ABNORMAL
KETONES UR QL STRIP: NEGATIVE
LEUKOCYTE ESTERASE UR QL STRIP: ABNORMAL
MICRO URNS: ABNORMAL
NITRITE UR QL STRIP: NEGATIVE
PH UR STRIP: 6.5 [PH] (ref 5–7.5)
PROT UR QL STRIP: ABNORMAL
RBC #/AREA URNS HPF: ABNORMAL /HPF (ref 0–2)
SP GR UR STRIP: 1.01 (ref 1–1.03)
UROBILINOGEN UR STRIP-MCNC: 0.2 MG/DL (ref 0.2–1)
WBC #/AREA URNS HPF: >30 /HPF (ref 0–5)

## 2023-05-12 RX ORDER — CEPHALEXIN 250 MG/1
250 CAPSULE ORAL 4 TIMES DAILY
Qty: 28 CAPSULE | Refills: 0 | Status: SHIPPED | OUTPATIENT
Start: 2023-05-12 | End: 2023-05-19

## 2023-05-30 ENCOUNTER — TELEPHONE (OUTPATIENT)
Dept: FAMILY MEDICINE CLINIC | Facility: CLINIC | Age: 63
End: 2023-05-30

## 2023-05-30 ENCOUNTER — HOSPITAL ENCOUNTER (OUTPATIENT)
Dept: MRI IMAGING | Facility: HOSPITAL | Age: 63
Discharge: HOME | End: 2023-05-30
Admitting: ORTHOPAEDIC SURGERY

## 2023-05-30 DIAGNOSIS — M41.86 OTHER FORMS OF SCOLIOSIS, LUMBAR REGION: ICD-10-CM

## 2023-05-30 DIAGNOSIS — M51.36 DEGENERATION OF LUMBAR INTERVERTEBRAL DISC: ICD-10-CM

## 2023-05-30 PROCEDURE — 72148 MRI LUMBAR SPINE W/O DYE: CPT

## 2023-05-30 NOTE — TELEPHONE ENCOUNTER
Spoke with Patient regarding results and verbally understood . Stated she wishes to stay within our [facility] in [place] if possible. Any day of the week and 10am or later in day is best for her.

## 2023-06-06 ENCOUNTER — OFFICE VISIT (OUTPATIENT)
Dept: FAMILY MEDICINE CLINIC | Facility: CLINIC | Age: 63
End: 2023-06-06
Payer: MEDICARE

## 2023-06-06 VITALS
OXYGEN SATURATION: 98 % | DIASTOLIC BLOOD PRESSURE: 72 MMHG | WEIGHT: 165.2 LBS | SYSTOLIC BLOOD PRESSURE: 111 MMHG | HEIGHT: 63 IN | TEMPERATURE: 96.9 F | HEART RATE: 78 BPM | RESPIRATION RATE: 18 BRPM | BODY MASS INDEX: 29.27 KG/M2

## 2023-06-06 DIAGNOSIS — E66.3 OVERWEIGHT WITH BODY MASS INDEX (BMI) OF 29 TO 29.9 IN ADULT: ICD-10-CM

## 2023-06-06 DIAGNOSIS — M51.36 DDD (DEGENERATIVE DISC DISEASE), LUMBAR: Primary | ICD-10-CM

## 2023-06-06 PROCEDURE — 1159F MED LIST DOCD IN RCRD: CPT | Performed by: NURSE PRACTITIONER

## 2023-06-06 PROCEDURE — 99213 OFFICE O/P EST LOW 20 MIN: CPT | Performed by: NURSE PRACTITIONER

## 2023-06-06 PROCEDURE — 3044F HG A1C LEVEL LT 7.0%: CPT | Performed by: NURSE PRACTITIONER

## 2023-06-06 PROCEDURE — 1160F RVW MEDS BY RX/DR IN RCRD: CPT | Performed by: NURSE PRACTITIONER

## 2023-06-06 RX ORDER — HYDROCODONE BITARTRATE AND ACETAMINOPHEN 7.5; 325 MG/1; MG/1
1 TABLET ORAL EVERY 6 HOURS PRN
Qty: 30 TABLET | Refills: 0 | Status: SHIPPED | OUTPATIENT
Start: 2023-06-06

## 2023-06-06 NOTE — PROGRESS NOTES
"Chief Complaint  Pain (Back and hip pain)    Subjective        Denisa Michelle presents to [facility]  History of Present Illness  Patient with long standing orthopedic history, currently followed by 2 physicians at [facility] (Dr. Irwin and Dr. Ramos).  She has pain in the left hip that radiates into the thigh and up into the back.  She does not routinely take opioids but the recent steroid injection at [facility] only afforded 2 days of relief.  She has been unable to sleep due to the severity of the pain.  She last was prescribed hydrocodone on 3/21/23 for 30 tab.  She takes them sparingly.  She has shown neither signs of abuse or misuse. She is asking for another short supply.    Objective   Vital Signs:  /72 (BP Location: Left arm, Patient Position: Sitting, Cuff Size: Adult)   Pulse 78   Temp 96.9 °F (36.1 °C) (Temporal)   Resp 18   Ht 158.8 cm (62.52\")   Wt 74.9 kg (165 lb 3.2 oz)   SpO2 98%   BMI 29.71 kg/m²   Estimated body mass index is 29.71 kg/m² as calculated from the following:    Height as of this encounter: 158.8 cm (62.52\").    Weight as of this encounter: 74.9 kg (165 lb 3.2 oz).    BMI is >= 25 and <30. (Overweight) The following options were offered after discussion;: exercise counseling/recommendations and nutrition counseling/recommendations       Physical Exam  Vitals and nursing note reviewed.   Constitutional:       General: She is not in acute distress.     Appearance: Normal appearance. She is not ill-appearing.   HENT:      Head: Normocephalic and atraumatic.   Cardiovascular:      Rate and Rhythm: Normal rate and regular rhythm.      Heart sounds: Normal heart sounds.   Musculoskeletal:         General: Tenderness present.      Comments: Diminished spinal ROM secondary to pain. Pain to deep palpation of the left hip.   Skin:     General: Skin is warm and dry.      Comments: Multiple areas of purpura of both upper extremities.   Neurological:      " Mental Status: She is alert and oriented to person, place, and time.      Result Review :                Assessment and Plan   Diagnoses and all orders for this visit:    1. DDD (degenerative disc disease), lumbar (Primary)  -     HYDROcodone-acetaminophen (NORCO) 7.5-325 MG per tablet; Take 1 tablet by mouth Every 6 (Six) Hours As Needed for Moderate Pain.  Dispense: 30 tablet; Refill: 0    2. Overweight with body mass index (BMI) of 29 to 29.9 in adult    If occasional hydrocodone is needed, recommend completion of UDS and controlled substance agreement with the next request.         Follow Up   Return in about 3 months (around 9/6/2023) for Next scheduled follow up with Shelli.  Patient was given instructions and counseling regarding her condition or for health maintenance advice. Please see specific information pulled into the AVS if appropriate.     NEW Mcgrath  This note is electronically signed.

## 2023-06-19 DIAGNOSIS — J30.2 SEASONAL ALLERGIES: ICD-10-CM

## 2023-06-19 RX ORDER — LORATADINE PSEUDOEPHEDRINE SULFATE 10; 240 MG/1; MG/1
TABLET, EXTENDED RELEASE ORAL
Qty: 15 TABLET | Refills: 5 | Status: SHIPPED | OUTPATIENT
Start: 2023-06-19 | End: 2023-06-21 | Stop reason: SDUPTHER

## 2023-06-19 NOTE — TELEPHONE ENCOUNTER
Rx Refill Note  Requested Prescriptions     Pending Prescriptions Disp Refills    Claritin-D 24 Hour  MG per 24 hr tablet [Pharmacy Med Name: CLARITIN-D 24 HOUR 10-240MG TB24] 15 tablet 5     Sig: TAKE ONE TABLET BY MOUTH EVERY DAY      Last office visit with prescribing clinician: 3/14/2023   Last telemedicine visit with prescribing clinician: Visit date not found   Next office visit with prescribing clinician: Visit date not found       Carmelina Conway MA  06/19/23, 12:27 CDT

## 2023-07-20 PROBLEM — M25.50 MULTIPLE JOINT PAIN: Status: ACTIVE | Noted: 2023-07-20

## 2023-07-26 ENCOUNTER — OFFICE VISIT (OUTPATIENT)
Dept: FAMILY MEDICINE CLINIC | Facility: CLINIC | Age: 63
End: 2023-07-26
Payer: MEDICARE

## 2023-07-26 VITALS
SYSTOLIC BLOOD PRESSURE: 122 MMHG | HEIGHT: 63 IN | BODY MASS INDEX: 27.14 KG/M2 | RESPIRATION RATE: 18 BRPM | TEMPERATURE: 97.2 F | OXYGEN SATURATION: 95 % | HEART RATE: 94 BPM | WEIGHT: 153.2 LBS | DIASTOLIC BLOOD PRESSURE: 76 MMHG

## 2023-07-26 DIAGNOSIS — M51.36 DDD (DEGENERATIVE DISC DISEASE), LUMBAR: ICD-10-CM

## 2023-07-26 DIAGNOSIS — K59.1 FUNCTIONAL DIARRHEA: ICD-10-CM

## 2023-07-26 DIAGNOSIS — M25.50 MULTIPLE JOINT PAIN: Primary | ICD-10-CM

## 2023-07-26 RX ORDER — HYDROCODONE BITARTRATE AND ACETAMINOPHEN 7.5; 325 MG/1; MG/1
1 TABLET ORAL EVERY 4 HOURS PRN
Qty: 30 TABLET | Refills: 0 | Status: SHIPPED | OUTPATIENT
Start: 2023-07-26

## 2023-07-26 NOTE — PROGRESS NOTES
"Chief Complaint   Patient presents with    Med Refill     Joint pain and fatigue        Subjective   Denisa Michelle is a 63 y.o. female who presents today for multiple joint pain and diarrhea.     HPI   Since last visit, her joint pain got better.   She has had a round of diarrhea x approximately 1 week with no appetite. She ate for the first time today at lunch. She has had no energy and thinks she has had fever and chills.  She complains of multiple joint pain. Her hands are stiff, and her knees and hips painful. She has pain in her upper back down to her hips. She has difficulty getting out of bed due to the pain. This has been a recurring complaint, but the current episode has been going on about 2 weeks.   She has seen a rheumatologist before, but had to cancel an appointment and now is finding it hard to get an appointment now.       Allergies   Allergen Reactions    Clindamycin/Lincomycin Hives and Rash         OBJECTIVE:  Vitals:    07/26/23 1335   BP: 122/76   BP Location: Left arm   Patient Position: Sitting   Cuff Size: Adult   Pulse: 94   Resp: 18   Temp: 97.2 °F (36.2 °C)   TempSrc: Temporal   SpO2: 95%   Weight: 69.5 kg (153 lb 3.2 oz)   Height: 158.8 cm (62.52\")     Physical Exam  Vitals and nursing note reviewed.   Constitutional:       Appearance: Normal appearance.   Cardiovascular:      Rate and Rhythm: Normal rate and regular rhythm.      Pulses: Normal pulses.      Heart sounds: Normal heart sounds.   Pulmonary:      Effort: Pulmonary effort is normal.      Breath sounds: Normal breath sounds.   Musculoskeletal:      Right hand: Tenderness present. Decreased range of motion. Decreased strength.      Left hand: Tenderness present. Decreased range of motion. Decreased strength.      Thoracic back: Spasms and tenderness present. Decreased range of motion. Scoliosis present.      Lumbar back: Spasms and tenderness present. Decreased range of motion. Scoliosis present.      Right hip: Tenderness and " crepitus present. Decreased range of motion. Decreased strength.      Left hip: Tenderness and crepitus present. Decreased range of motion. Decreased strength.   Skin:     General: Skin is warm and dry.   Neurological:      General: No focal deficit present.      Mental Status: She is alert and oriented to person, place, and time.   Psychiatric:         Mood and Affect: Mood normal.         Behavior: Behavior normal.         Thought Content: Thought content normal.         Judgment: Judgment normal.                  ASSESSMENT/ PLAN:    Diagnoses and all orders for this visit:    1. Multiple joint pain (Primary)  -     Cancel: TERRENCE; Future  -     Cancel: C-reactive protein; Future  -     Cancel: Rheumatoid Factor, Quant; Future  -     Cancel: Sedimentation rate, automated; Future  -     Ambulatory Referral to Rheumatology  -     HYDROcodone-acetaminophen (NORCO) 7.5-325 MG per tablet; Take 1 tablet by mouth Every 4 (Four) Hours As Needed for Moderate Pain.  Dispense: 30 tablet; Refill: 0  -     TERRENCE  -     C-reactive protein  -     Rheumatoid Factor, Quant  -     Sedimentation rate, automated    2. DDD (degenerative disc disease), lumbar  -     HYDROcodone-acetaminophen (NORCO) 7.5-325 MG per tablet; Take 1 tablet by mouth Every 4 (Four) Hours As Needed for Moderate Pain.  Dispense: 30 tablet; Refill: 0    3. Functional diarrhea  -     Clostridioides difficile Toxin, PCR - Stool, Per Rectum; Future      Procedures     Management Plan:   We have run rheumatoid panels before that were negative over the years. We will check another but send in the referral to rheumatology regardless of result. She will try to get a stool specimen and bring back for testing.   An After Visit Summary was printed and given to the patient at discharge.    Follow-up: Return if symptoms worsen or fail to improve.         NEW Trivedi 7/27/2023 07:08 CDT  This note was electronically signed.

## 2023-07-27 PROBLEM — M51.36 DDD (DEGENERATIVE DISC DISEASE), LUMBAR: Status: ACTIVE | Noted: 2023-07-27

## 2023-07-27 PROBLEM — M51.369 DDD (DEGENERATIVE DISC DISEASE), LUMBAR: Status: ACTIVE | Noted: 2023-07-27

## 2023-07-27 LAB
ANA SER QL: NEGATIVE
CRP SERPL-MCNC: <1 MG/L (ref 0–10)
ERYTHROCYTE [SEDIMENTATION RATE] IN BLOOD BY WESTERGREN METHOD: 2 MM/HR (ref 0–40)
RHEUMATOID FACT SERPL-ACNC: <10 IU/ML

## 2023-08-17 DIAGNOSIS — K21.9 GERD WITHOUT ESOPHAGITIS: ICD-10-CM

## 2023-08-17 RX ORDER — OMEPRAZOLE 20 MG/1
CAPSULE, DELAYED RELEASE ORAL
Qty: 180 CAPSULE | Refills: 4 | Status: SHIPPED | OUTPATIENT
Start: 2023-08-17

## 2023-08-17 NOTE — TELEPHONE ENCOUNTER
Rx Refill Note  Requested Prescriptions     Pending Prescriptions Disp Refills    omeprazole (priLOSEC) 20 MG capsule [Pharmacy Med Name: OMEPRAZOLE 20MG CPDR] 180 capsule 4     Sig: TAKE ONE CAPSULE BY MOUTH TWICE A DAY      Last office visit with prescribing clinician: 7/26/2023         Matilde Bueno MA  08/17/23, 15:46 CDT

## 2023-08-25 NOTE — TELEPHONE ENCOUNTER
Rx Refill Note  Requested Prescriptions     Pending Prescriptions Disp Refills    levothyroxine (SYNTHROID, LEVOTHROID) 50 MCG tablet 90 tablet 3     Sig: Take 1 tablet by mouth Daily.        Imani Campos MA  08/25/23, 11:57 CDT

## 2023-08-28 ENCOUNTER — OFFICE VISIT (OUTPATIENT)
Dept: FAMILY MEDICINE CLINIC | Facility: CLINIC | Age: 63
End: 2023-08-28
Payer: MEDICARE

## 2023-08-28 VITALS
HEART RATE: 96 BPM | TEMPERATURE: 96.2 F | BODY MASS INDEX: 25.91 KG/M2 | OXYGEN SATURATION: 98 % | DIASTOLIC BLOOD PRESSURE: 76 MMHG | RESPIRATION RATE: 18 BRPM | HEIGHT: 63 IN | WEIGHT: 146.2 LBS | SYSTOLIC BLOOD PRESSURE: 122 MMHG

## 2023-08-28 DIAGNOSIS — E66.3 OVERWEIGHT (BMI 25.0-29.9): ICD-10-CM

## 2023-08-28 DIAGNOSIS — J30.2 SEASONAL ALLERGIES: ICD-10-CM

## 2023-08-28 DIAGNOSIS — M25.50 MULTIPLE JOINT PAIN: ICD-10-CM

## 2023-08-28 DIAGNOSIS — M25.552 LEFT HIP PAIN: Primary | ICD-10-CM

## 2023-08-28 DIAGNOSIS — M51.37 DDD (DEGENERATIVE DISC DISEASE), LUMBOSACRAL: ICD-10-CM

## 2023-08-28 PROBLEM — E66.09 CLASS 1 OBESITY DUE TO EXCESS CALORIES WITHOUT SERIOUS COMORBIDITY WITH BODY MASS INDEX (BMI) OF 30.0 TO 30.9 IN ADULT: Status: RESOLVED | Noted: 2022-07-28 | Resolved: 2023-08-28

## 2023-08-28 PROBLEM — E66.811 CLASS 1 OBESITY DUE TO EXCESS CALORIES WITHOUT SERIOUS COMORBIDITY WITH BODY MASS INDEX (BMI) OF 30.0 TO 30.9 IN ADULT: Status: RESOLVED | Noted: 2022-07-28 | Resolved: 2023-08-28

## 2023-08-28 RX ORDER — HYDROCODONE BITARTRATE AND ACETAMINOPHEN 10; 325 MG/1; MG/1
1 TABLET ORAL EVERY 6 HOURS PRN
Qty: 30 TABLET | Refills: 0 | Status: SHIPPED | OUTPATIENT
Start: 2023-08-28

## 2023-08-28 RX ORDER — KETOROLAC TROMETHAMINE 30 MG/ML
30 INJECTION, SOLUTION INTRAMUSCULAR; INTRAVENOUS EVERY 6 HOURS PRN
Status: SHIPPED | OUTPATIENT
Start: 2023-08-28 | End: 2023-09-02

## 2023-08-28 RX ORDER — LEVOTHYROXINE SODIUM 0.05 MG/1
50 TABLET ORAL DAILY
Qty: 90 TABLET | Refills: 3 | Status: SHIPPED | OUTPATIENT
Start: 2023-08-28

## 2023-08-28 RX ORDER — AZELASTINE 1 MG/ML
2 SPRAY, METERED NASAL 2 TIMES DAILY
Qty: 30 ML | Refills: 12 | Status: SHIPPED | OUTPATIENT
Start: 2023-08-28

## 2023-08-28 RX ADMIN — KETOROLAC TROMETHAMINE 30 MG: 30 INJECTION, SOLUTION INTRAMUSCULAR; INTRAVENOUS at 10:58

## 2023-08-28 RX ADMIN — CYANOCOBALAMIN 1000 MCG: 1000 INJECTION, SOLUTION INTRAMUSCULAR; SUBCUTANEOUS at 10:37

## 2023-08-28 NOTE — PROGRESS NOTES
"Chief Complaint   Patient presents with    Pain     Muscle and joint pain        Subjective   Denisa Michelle is a 63 y.o. female who presents today for left hip pain and generalized muscle and joint pain.     HPI   Sunday she had joint pain when it started raining. She is having difficulty walking due to left hip pain and bilateral knee pain. She complains of pain in all of her joints, but especially her left hip. She started  physical therapy, but could only complete two sessions due to left hip pain. She also has decreased strength and it has caused multiple falls, so far with no serious complications, but she has had a few serious lacerations that required stitches. Routine x-rays of left hip were normal. Suspect a fracture that might only be seen on MRI.   She complains of seasonal allergies and would like to try astelin.   She has been diagnosed with DDD.     Allergies   Allergen Reactions    Clindamycin/Lincomycin Hives and Rash         OBJECTIVE:  Vitals:    08/28/23 1013   BP: 122/76   BP Location: Left arm   Patient Position: Sitting   Cuff Size: Adult   Pulse: 96   Resp: 18   Temp: 96.2 øF (35.7 øC)   SpO2: 98%   Weight: 66.3 kg (146 lb 3.2 oz)   Height: 158.8 cm (62.52\")     Physical Exam  Vitals and nursing note reviewed.   Constitutional:       Appearance: Normal appearance.   Cardiovascular:      Rate and Rhythm: Normal rate and regular rhythm.      Pulses: Normal pulses.      Heart sounds: Normal heart sounds.   Pulmonary:      Effort: Pulmonary effort is normal.      Breath sounds: Normal breath sounds.   Musculoskeletal:      Lumbar back: Spasms and tenderness present. Decreased range of motion.      Left hip: Tenderness and crepitus present. Decreased range of motion. Decreased strength.   Skin:     General: Skin is warm and dry.   Neurological:      General: No focal deficit present.      Mental Status: She is alert and oriented to person, place, and time.   Psychiatric:         Mood and Affect: " Mood normal.         Behavior: Behavior normal.         Thought Content: Thought content normal.         Judgment: Judgment normal.       BMI is >= 25 and <30. (Overweight) The following options were offered after discussion;: weight loss educational material (shared in after visit summary), exercise counseling/recommendations, and nutrition counseling/recommendations           ASSESSMENT/ PLAN:    Diagnoses and all orders for this visit:    1. Left hip pain (Primary)  -     HYDROcodone-acetaminophen (Norco)  MG per tablet; Take 1 tablet by mouth Every 6 (Six) Hours As Needed for Moderate Pain.  Dispense: 30 tablet; Refill: 0  -     ketorolac (TORADOL) injection 30 mg  -     MRI Hip Left Without Contrast; Future    2. DDD (degenerative disc disease), lumbosacral  -     HYDROcodone-acetaminophen (Norco)  MG per tablet; Take 1 tablet by mouth Every 6 (Six) Hours As Needed for Moderate Pain.  Dispense: 30 tablet; Refill: 0    3. Multiple joint pain  -     HYDROcodone-acetaminophen (Norco)  MG per tablet; Take 1 tablet by mouth Every 6 (Six) Hours As Needed for Moderate Pain.  Dispense: 30 tablet; Refill: 0    4. Seasonal allergies  -     azelastine (ASTELIN) 0.1 % nasal spray; 2 sprays into the nostril(s) as directed by provider 2 (Two) Times a Day. Use in each nostril as directed  Dispense: 30 mL; Refill: 12    5. Overweight (BMI 25.0-29.9)      Procedures     Management Plan:     An After Visit Summary was printed and given to the patient at discharge.    Follow-up: No follow-ups on file.         NEW Trivedi 8/28/2023 12:52 CDT  This note was electronically signed.

## 2023-09-20 ENCOUNTER — OFFICE VISIT (OUTPATIENT)
Dept: FAMILY MEDICINE CLINIC | Facility: CLINIC | Age: 63
End: 2023-09-20
Payer: MEDICARE

## 2023-09-20 VITALS
SYSTOLIC BLOOD PRESSURE: 128 MMHG | DIASTOLIC BLOOD PRESSURE: 72 MMHG | WEIGHT: 147.6 LBS | TEMPERATURE: 96 F | OXYGEN SATURATION: 95 % | HEART RATE: 65 BPM | HEIGHT: 63 IN | BODY MASS INDEX: 26.15 KG/M2 | RESPIRATION RATE: 18 BRPM

## 2023-09-20 DIAGNOSIS — E66.3 OVERWEIGHT WITH BODY MASS INDEX (BMI) OF 26 TO 26.9 IN ADULT: ICD-10-CM

## 2023-09-20 DIAGNOSIS — M51.36 DDD (DEGENERATIVE DISC DISEASE), LUMBAR: ICD-10-CM

## 2023-09-20 DIAGNOSIS — M54.41 ACUTE RIGHT-SIDED LOW BACK PAIN WITH RIGHT-SIDED SCIATICA: ICD-10-CM

## 2023-09-20 DIAGNOSIS — M25.50 MULTIPLE JOINT PAIN: ICD-10-CM

## 2023-09-20 DIAGNOSIS — Z79.899 LONG-TERM USE OF HIGH-RISK MEDICATION: Primary | ICD-10-CM

## 2023-09-20 RX ORDER — HYDROCODONE BITARTRATE AND ACETAMINOPHEN 7.5; 325 MG/1; MG/1
1 TABLET ORAL EVERY 4 HOURS PRN
Qty: 30 TABLET | Refills: 0 | Status: SHIPPED | OUTPATIENT
Start: 2023-09-20

## 2023-09-20 NOTE — PROGRESS NOTES
"Chief Complaint   Patient presents with    Joint Pain     Lower back         Subjective   Denisa Michelle is a 63 y.o. female who presents today for low back pain.     HPI   Over the past two weeks, her dog has been urinating in the floor. The patient states she has had really bad diarrhea once in the past week and she has been leaning over trying to clean up both messes which has exacerbated already present low back pain. She has radiculopathy to posterior right thigh.   Contract and UDS done today. She is aware that narcotics can leave her with tolerance or addiction. She is compliant with her medications. DES has been reviewed.     Allergies   Allergen Reactions    Clindamycin/Lincomycin Hives and Rash         OBJECTIVE:  Vitals:    09/20/23 0932   BP: 128/72   Pulse: 65   Resp: 18   Temp: 96 °F (35.6 °C)   TempSrc: Temporal   SpO2: 95%   Weight: 67 kg (147 lb 9.6 oz)   Height: 158.8 cm (62.52\")     Physical Exam  Vitals and nursing note reviewed.   Constitutional:       Appearance: Normal appearance.   Cardiovascular:      Rate and Rhythm: Normal rate and regular rhythm.      Pulses: Normal pulses.      Heart sounds: Normal heart sounds.   Pulmonary:      Effort: Pulmonary effort is normal.      Breath sounds: Normal breath sounds.   Musculoskeletal:      Lumbar back: Spasms and tenderness present. Decreased range of motion.      Right upper leg: Tenderness present.   Skin:     General: Skin is warm and dry.   Neurological:      General: No focal deficit present.      Mental Status: She is alert and oriented to person, place, and time.   Psychiatric:         Mood and Affect: Mood normal.         Behavior: Behavior normal.         Thought Content: Thought content normal.         Judgment: Judgment normal.       BMI is >= 25 and <30. (Overweight) The following options were offered after discussion;: weight loss educational material (shared in after visit summary), exercise counseling/recommendations, and " nutrition counseling/recommendations           ASSESSMENT/ PLAN:    Diagnoses and all orders for this visit:    1. Long-term use of high-risk medication (Primary)  -     Compliance Drug Analysis, Ur - Urine, Clean Catch    2. Multiple joint pain  -     HYDROcodone-acetaminophen (NORCO) 7.5-325 MG per tablet; Take 1 tablet by mouth Every 4 (Four) Hours As Needed for Moderate Pain.  Dispense: 30 tablet; Refill: 0    3. DDD (degenerative disc disease), lumbar  -     HYDROcodone-acetaminophen (NORCO) 7.5-325 MG per tablet; Take 1 tablet by mouth Every 4 (Four) Hours As Needed for Moderate Pain.  Dispense: 30 tablet; Refill: 0    4. Acute right-sided low back pain with right-sided sciatica  -     HYDROcodone-acetaminophen (NORCO) 7.5-325 MG per tablet; Take 1 tablet by mouth Every 4 (Four) Hours As Needed for Moderate Pain.  Dispense: 30 tablet; Refill: 0    5. Overweight with body mass index (BMI) of 26 to 26.9 in adult      Procedures     Management Plan:     An After Visit Summary was printed and given to the patient at discharge.    Follow-up: Return if symptoms worsen or fail to improve.         Shelli Da Silva, APRN 9/20/2023 10:39 CDT  This note was electronically signed.

## 2023-09-22 ENCOUNTER — OFFICE VISIT (OUTPATIENT)
Dept: CARDIOLOGY | Facility: CLINIC | Age: 63
End: 2023-09-22
Payer: MEDICARE

## 2023-09-22 VITALS
HEART RATE: 101 BPM | SYSTOLIC BLOOD PRESSURE: 92 MMHG | WEIGHT: 148 LBS | DIASTOLIC BLOOD PRESSURE: 60 MMHG | HEIGHT: 62 IN | BODY MASS INDEX: 27.23 KG/M2 | RESPIRATION RATE: 18 BRPM | OXYGEN SATURATION: 95 %

## 2023-09-22 DIAGNOSIS — T82.110A PACEMAKER LEAD MALFUNCTION, INITIAL ENCOUNTER: ICD-10-CM

## 2023-09-22 DIAGNOSIS — I49.5 SINUS NODE DYSFUNCTION: Primary | ICD-10-CM

## 2023-09-22 NOTE — PROGRESS NOTES
"EP NEW PATIENT VISIT    Chief Complaint  No chief complaint on file.    Subjective        History of Present Illness    EP Problems:  1.  Sinus node dysfunction  2.  Presence of a cardiac pacemaker  -12/2020: DOI, dual-chamber [employer]    Cardiology Problems:  1.  Chest pain  2.  Hyperlipidemia    Medical Problems:  1.  GERD  2.  Anxiety  3.  DJD  4.  Insomnia  5.  Breast cancer  6.  Type 2 diabetes    Denisa Michelle is a 63 y.o. female with problem list as above who presents to the clinic for evaluation of possible RV lead fracture.  She had notes of a slow elevation in her pacing impedance over the course of approximately 3 months from May through July.  There was concern that there might be RV lead fracture as a result.  Given this, she was sent to EP for further evaluation.  She denies any symptoms of lightheadedness, dizziness, abnormal stimulation from her permanent pacemaker.    Objective   Vital Signs:  BP 92/60 (BP Location: Left arm)   Pulse 101   Resp 18   Ht 157.5 cm (62\")   Wt 67.1 kg (148 lb)   SpO2 95%   BMI 27.07 kg/m²   Estimated body mass index is 27.07 kg/m² as calculated from the following:    Height as of this encounter: 157.5 cm (62\").    Weight as of this encounter: 67.1 kg (148 lb).      Physical Exam  Vitals reviewed.   Constitutional:       Appearance: Normal appearance.   Cardiovascular:      Rate and Rhythm: Normal rate and regular rhythm.      Pulses: Normal pulses.      Heart sounds: Normal heart sounds. No murmur heard.     Comments: Pulse generator is clean and intact  Pulmonary:      Effort: Pulmonary effort is normal. No respiratory distress.      Breath sounds: Normal breath sounds.   Skin:     General: Skin is warm and dry.   Neurological:      General: No focal deficit present.      Mental Status: She is alert and oriented to person, place, and time.   Psychiatric:         Mood and Affect: Mood normal.         Judgment: Judgment normal.      Result Review :  The " following data was reviewed by: Luis Enrique Duval MD on 09/22/2023:  CMP          11/18/2022    08:06 2/14/2023    09:31 7/5/2023    10:04   CMP   Glucose 85  144  87    BUN 7  12  9    Creatinine 1.11  1.25  0.95    Sodium 134  133  138    Potassium 4.5  4.3  4.2    Chloride 99  97  99    Calcium 9.2  8.9  9.4    Total Protein 5.9  5.4  5.9    Albumin 4.2  3.9  4.4    Globulin 1.7  1.5  1.5    Total Bilirubin 0.6  0.6  0.9    Alkaline Phosphatase 65  93  86    AST (SGOT) 15  16  18    ALT (SGPT) 13  15  12    BUN/Creatinine Ratio 6  10  9      CBC          11/18/2022    08:06 2/14/2023    09:31 7/5/2023    10:04   CBC   WBC 5.7  7.2  7.3    RBC 4.71  4.30  4.85    Hemoglobin 14.6  14.3  15.2    Hematocrit 44.3  41.2  44.7    MCV 94  96  92    MCH 31.0  33.3  31.3    MCHC 33.0  34.7  34.0    RDW 13.2  13.8  12.8    Platelets 252  259  284      TSH          11/18/2022    08:06   TSH   TSH 1.960      Device was interrogated and independently reviewed.  The bipolar RV lead impedance is mildly elevated at 1350 ohms.  Sensing is greater than 25 V.  Bipolar threshold is elevated at 1.5 V at 0.4 ms.  No high ventricular rate episodes to suggest obvious lead fracture.  Unipolar pacing threshold is lower at 0.7 V at 0.4 ms with improved impedance at 750 ohms.  Paces the RV less than 1% of the time.      ECG 12 Lead    Date/Time: 9/22/2023 11:38 AM  Performed by: Luis Enrique Duval MD  Authorized by: Luis Enrique Duval MD   Comparison: not compared with previous ECG   Previous ECG: no previous ECG available  Rhythm: sinus rhythm  Rate: normal  Conduction: conduction normal  QRS axis: normal  Other findings: non-specific ST-T wave changes    Clinical impression: abnormal EKG            Assessment and Plan     Diagnoses and all orders for this visit:    1. Sinus node dysfunction (Primary)    2. Pacemaker lead malfunction, initial encounter    Other orders  -     ECG 12 Lead        Denisa Michelle is a 63 y.o. female with problem list as above  who presents to the clinic for evaluation of sinus node dysfunction pacemaker made lead malfunction.  Her right atrial lead appears to be well functioning for her sinus node dysfunction.  Her RV lead shows elevated ends of elevated impedance but no other significant evidence to suggest lead fracture.  It appears to be functioning normally with only mildly elevated bipolar threshold.  I suspect that she is developing some fibrosis which has resulted in the elevated impedance.  Given no other evidence of lead fracture, I have reprogrammed her to unipolar pacing from the RV lead has a threshold is much lower to preserve battery life.  Device extraction or lead replacement are not necessary at this time.  Even if her RV lead was to become fully nonfunctional, given no clinical evidence of AV block, lead replacement would still not be recommended at that time given lack of indication for RV pacing.    Plan:  -No need for RV lead replacement this time  -No significant evidence of RV lead fracture  -Device reprogrammed to unipolar RV pacing to preserve battery life  -She will continue to follow-up at her normal clinic given that she is out of network for hospital  -Should her findings worsen over time, I am happy to see her back in clinic at that time       Follow Up     No follow-ups on file.  Patient was given instructions and counseling regarding her condition or for health maintenance advice. Please see specific information pulled into the AVS if appropriate.     Part of this note may be an electronic transcription/translation of spoken language to printed text using the Dragon Dictation System.

## 2023-09-26 LAB — DRUGS UR: NORMAL

## 2023-10-09 ENCOUNTER — OFFICE VISIT (OUTPATIENT)
Dept: FAMILY MEDICINE CLINIC | Facility: CLINIC | Age: 63
End: 2023-10-09
Payer: MEDICARE

## 2023-10-09 VITALS
RESPIRATION RATE: 18 BRPM | OXYGEN SATURATION: 98 % | HEART RATE: 88 BPM | WEIGHT: 151.4 LBS | DIASTOLIC BLOOD PRESSURE: 70 MMHG | HEIGHT: 62 IN | SYSTOLIC BLOOD PRESSURE: 120 MMHG | TEMPERATURE: 97.8 F | BODY MASS INDEX: 27.86 KG/M2

## 2023-10-09 DIAGNOSIS — M25.50 MULTIPLE JOINT PAIN: ICD-10-CM

## 2023-10-09 DIAGNOSIS — M54.41 ACUTE RIGHT-SIDED LOW BACK PAIN WITH RIGHT-SIDED SCIATICA: ICD-10-CM

## 2023-10-09 DIAGNOSIS — T07.XXXA MULTIPLE ABRASIONS: ICD-10-CM
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
## 2023-10-09 DIAGNOSIS — M25.511 ACUTE PAIN OF RIGHT SHOULDER: ICD-10-CM

## 2023-10-09 DIAGNOSIS — S30.0XXA TRAUMATIC ECCHYMOSIS OF BUTTOCK, INITIAL ENCOUNTER: ICD-10-CM

## 2023-10-09 RX ORDER — HYDROCODONE BITARTRATE AND ACETAMINOPHEN 7.5; 325 MG/1; MG/1
1 TABLET ORAL EVERY 4 HOURS PRN
Qty: 30 TABLET | Refills: 0 | Status: SHIPPED | OUTPATIENT
Start: 2023-10-09

## 2023-10-09 NOTE — PROGRESS NOTES
"Chief Complaint   Patient presents with    Pain     Lower back fell.  Skin tear left outer forarm        Subjective   Denisa Michelle is a 63 y.o. female who presents today for fall two days ago.     HPI   She got up in the night and fell in her bathroom. She has multiple abrasions on arms and legs, and a large skin tear on her right forearm. She also has multiple bruises, mainly on right back and buttocks. Her left shoulder and lower back are painful.     Allergies   Allergen Reactions    Clindamycin/Lincomycin Hives and Rash         OBJECTIVE:  Vitals:    10/09/23 1021   BP: 120/70   Pulse: 88   Resp: 18   Temp: 97.8 øF (36.6 øC)   TempSrc: Temporal   SpO2: 98%   Weight: 68.7 kg (151 lb 6.4 oz)   Height: 157.5 cm (62\")     Physical Exam  Skin:     Comments: Multiple abrasions on bilateral upper and lower extremities. She has ecchymosis on bilateral buttocks and lower back. Large skin tear right forearm. All are superficial in nature.          BMI is >= 25 and <30. (Overweight) The following options were offered after discussion;: weight loss educational material (shared in after visit summary), exercise counseling/recommendations, and nutrition counseling/recommendations           ASSESSMENT/ PLAN:    Diagnoses and all orders for this visit:    1. Fall, initial encounter (Primary)  -     HYDROcodone-acetaminophen (NORCO) 7.5-325 MG per tablet; Take 1 tablet by mouth Every 4 (Four) Hours As Needed for Moderate Pain.  Dispense: 30 tablet; Refill: 0    2. Multiple joint pain  -     HYDROcodone-acetaminophen (NORCO) 7.5-325 MG per tablet; Take 1 tablet by mouth Every 4 (Four) Hours As Needed for Moderate Pain.  Dispense: 30 tablet; Refill: 0    3. Acute right-sided low back pain with right-sided sciatica  -     HYDROcodone-acetaminophen (NORCO) 7.5-325 MG per tablet; Take 1 tablet by mouth Every 4 (Four) Hours As Needed for Moderate Pain.  Dispense: 30 tablet; Refill: 0    4. Acute pain of right shoulder  -     " HYDROcodone-acetaminophen (NORCO) 7.5-325 MG per tablet; Take 1 tablet by mouth Every 4 (Four) Hours As Needed for Moderate Pain.  Dispense: 30 tablet; Refill: 0    5. Multiple abrasions  -     HYDROcodone-acetaminophen (NORCO) 7.5-325 MG per tablet; Take 1 tablet by mouth Every 4 (Four) Hours As Needed for Moderate Pain.  Dispense: 30 tablet; Refill: 0    6. Traumatic ecchymosis of buttock, initial encounter  -     HYDROcodone-acetaminophen (NORCO) 7.5-325 MG per tablet; Take 1 tablet by mouth Every 4 (Four) Hours As Needed for Moderate Pain.  Dispense: 30 tablet; Refill: 0      Procedures     Management Plan:     An After Visit Summary was printed and given to the patient at discharge.    Follow-up: Return if symptoms worsen or fail to improve.         NEW Trivedi 10/9/2023 12:04 CDT  This note was electronically signed.

## 2023-10-27 RX ORDER — ERGOCALCIFEROL 1.25 MG/1
50000 CAPSULE ORAL WEEKLY
Qty: 12 CAPSULE | Refills: 4 | Status: SHIPPED | OUTPATIENT
Start: 2023-10-27

## 2023-10-27 NOTE — TELEPHONE ENCOUNTER
Rx Refill Note  Requested Prescriptions     Pending Prescriptions Disp Refills    vitamin D (ERGOCALCIFEROL) 1.25 MG (81319 UT) capsule capsule [Pharmacy Med Name: VITAMIN D (ERGOCALCIFE 1.25 MG CAPS] 12 capsule 4     Sig: TAKE ONE CAPSULE BY MOUTH ONCE WEEKLY      Last office visit with prescribing clinician: 10/9/2023         Matilde Bueno MA  10/27/23, 09:34 CDT

## 2023-10-31 ENCOUNTER — OFFICE VISIT (OUTPATIENT)
Dept: FAMILY MEDICINE CLINIC | Facility: CLINIC | Age: 63
End: 2023-10-31
Payer: MEDICARE

## 2023-10-31 VITALS
DIASTOLIC BLOOD PRESSURE: 72 MMHG | HEART RATE: 78 BPM | WEIGHT: 149 LBS | SYSTOLIC BLOOD PRESSURE: 124 MMHG | HEIGHT: 62 IN | BODY MASS INDEX: 27.42 KG/M2 | OXYGEN SATURATION: 98 % | RESPIRATION RATE: 18 BRPM | TEMPERATURE: 96 F

## 2023-10-31 DIAGNOSIS — M25.50 MULTIPLE JOINT PAIN: Primary | ICD-10-CM

## 2023-10-31 RX ORDER — HYDROCODONE BITARTRATE AND ACETAMINOPHEN 10; 325 MG/1; MG/1
1 TABLET ORAL EVERY 4 HOURS PRN
Qty: 30 TABLET | Refills: 0 | Status: SHIPPED | OUTPATIENT
Start: 2023-10-31

## 2023-10-31 RX ORDER — TRIAMCINOLONE ACETONIDE 40 MG/ML
80 INJECTION, SUSPENSION INTRA-ARTICULAR; INTRAMUSCULAR ONCE
Status: COMPLETED | OUTPATIENT
Start: 2023-10-31 | End: 2023-10-31

## 2023-10-31 RX ADMIN — TRIAMCINOLONE ACETONIDE 80 MG: 40 INJECTION, SUSPENSION INTRA-ARTICULAR; INTRAMUSCULAR at 09:53

## 2023-10-31 NOTE — PROGRESS NOTES
"Chief Complaint   Patient presents with    Joint Pain        Subjective   Denisa Michelle is a 63 y.o. female who presents today for multiple joint pain.     HPI   She has developed new joint pain with the weather change. Her right hip has been painful x 3-4 days. She is requesting something for pain and a steroid injection. She states it is most painful with movement and intermittent stabbing.     Allergies   Allergen Reactions    Clindamycin/Lincomycin Hives and Rash         OBJECTIVE:  Vitals:    10/31/23 0935   BP: 124/72   Pulse: 78   Resp: 18   Temp: 96 °F (35.6 °C)   TempSrc: Temporal   SpO2: 98%   Weight: 67.6 kg (149 lb)   Height: 157.5 cm (62\")     Physical Exam  Musculoskeletal:      Right hip: Tenderness and crepitus present. Decreased range of motion. Decreased strength.        Legs:                     ASSESSMENT/ PLAN:    Diagnoses and all orders for this visit:    1. Multiple joint pain (Primary)  -     triamcinolone acetonide (KENALOG-40) injection 80 mg  -     HYDROcodone-acetaminophen (NORCO)  MG per tablet; Take 1 tablet by mouth Every 4 (Four) Hours As Needed for Moderate Pain.  Dispense: 30 tablet; Refill: 0      Procedures     Management Plan:   Resume normal activities.   An After Visit Summary was printed and given to the patient at discharge.    Follow-up: Return if symptoms worsen or fail to improve.         NEW Trivedi 10/31/2023 10:03 CDT  This note was electronically signed.          "

## 2023-11-28 ENCOUNTER — CLINICAL SUPPORT (OUTPATIENT)
Dept: FAMILY MEDICINE CLINIC | Facility: CLINIC | Age: 63
End: 2023-11-28
Payer: MEDICARE

## 2023-11-28 ENCOUNTER — LAB (OUTPATIENT)
Dept: FAMILY MEDICINE CLINIC | Facility: CLINIC | Age: 63
End: 2023-11-28
Payer: MEDICARE

## 2023-11-28 DIAGNOSIS — E55.9 VITAMIN D DEFICIENCY: ICD-10-CM

## 2023-11-28 DIAGNOSIS — E78.00 ELEVATED LDL CHOLESTEROL LEVEL: ICD-10-CM

## 2023-11-28 DIAGNOSIS — Z00.00 ENCOUNTER FOR SUBSEQUENT ANNUAL WELLNESS VISIT IN MEDICARE PATIENT: Primary | ICD-10-CM

## 2023-11-28 DIAGNOSIS — E03.9 ACQUIRED HYPOTHYROIDISM: ICD-10-CM

## 2023-11-28 DIAGNOSIS — R53.82 CHRONIC FATIGUE: ICD-10-CM

## 2023-11-28 NOTE — PROGRESS NOTES
Patient presented self to the office today 11/28/23 for 1 suture removal from left cheek. No issue and patient tolerated well. No bleeding to note. Band aid was applied.

## 2023-11-29 LAB
25(OH)D3+25(OH)D2 SERPL-MCNC: 82.2 NG/ML (ref 30–100)
ALBUMIN SERPL-MCNC: 4.1 G/DL (ref 3.9–4.9)
ALBUMIN/GLOB SERPL: 2.4 {RATIO} (ref 1.2–2.2)
ALP SERPL-CCNC: 73 IU/L (ref 44–121)
ALT SERPL-CCNC: 16 IU/L (ref 0–32)
AST SERPL-CCNC: 12 IU/L (ref 0–40)
BASOPHILS # BLD AUTO: 0 X10E3/UL (ref 0–0.2)
BASOPHILS NFR BLD AUTO: 1 %
BILIRUB SERPL-MCNC: 0.7 MG/DL (ref 0–1.2)
BUN SERPL-MCNC: 12 MG/DL (ref 8–27)
BUN/CREAT SERPL: 12 (ref 12–28)
CALCIUM SERPL-MCNC: 8.9 MG/DL (ref 8.7–10.3)
CHLORIDE SERPL-SCNC: 102 MMOL/L (ref 96–106)
CHOLEST SERPL-MCNC: 146 MG/DL (ref 100–199)
CO2 SERPL-SCNC: 24 MMOL/L (ref 20–29)
CREAT SERPL-MCNC: 0.99 MG/DL (ref 0.57–1)
EGFRCR SERPLBLD CKD-EPI 2021: 63 ML/MIN/1.73
EOSINOPHIL # BLD AUTO: 0.1 X10E3/UL (ref 0–0.4)
EOSINOPHIL NFR BLD AUTO: 1 %
ERYTHROCYTE [DISTWIDTH] IN BLOOD BY AUTOMATED COUNT: 13.4 % (ref 11.7–15.4)
GLOBULIN SER CALC-MCNC: 1.7 G/DL (ref 1.5–4.5)
GLUCOSE SERPL-MCNC: 85 MG/DL (ref 70–99)
HCT VFR BLD AUTO: 43.7 % (ref 34–46.6)
HDLC SERPL-MCNC: 65 MG/DL
HGB BLD-MCNC: 14.9 G/DL (ref 11.1–15.9)
IMM GRANULOCYTES # BLD AUTO: 0 X10E3/UL (ref 0–0.1)
IMM GRANULOCYTES NFR BLD AUTO: 0 %
LDLC SERPL CALC-MCNC: 68 MG/DL (ref 0–99)
LDLC/HDLC SERPL: 1 RATIO (ref 0–3.2)
LYMPHOCYTES # BLD AUTO: 2.1 X10E3/UL (ref 0.7–3.1)
LYMPHOCYTES NFR BLD AUTO: 28 %
MCH RBC QN AUTO: 31.1 PG (ref 26.6–33)
MCHC RBC AUTO-ENTMCNC: 34.1 G/DL (ref 31.5–35.7)
MCV RBC AUTO: 91 FL (ref 79–97)
MONOCYTES # BLD AUTO: 0.5 X10E3/UL (ref 0.1–0.9)
MONOCYTES NFR BLD AUTO: 7 %
NEUTROPHILS # BLD AUTO: 4.8 X10E3/UL (ref 1.4–7)
NEUTROPHILS NFR BLD AUTO: 63 %
PLATELET # BLD AUTO: 243 X10E3/UL (ref 150–450)
POTASSIUM SERPL-SCNC: 4.2 MMOL/L (ref 3.5–5.2)
PROT SERPL-MCNC: 5.8 G/DL (ref 6–8.5)
RBC # BLD AUTO: 4.79 X10E6/UL (ref 3.77–5.28)
SODIUM SERPL-SCNC: 141 MMOL/L (ref 134–144)
T4 SERPL-MCNC: 9.1 UG/DL (ref 4.5–12)
TRIGL SERPL-MCNC: 67 MG/DL (ref 0–149)
TSH SERPL DL<=0.005 MIU/L-ACNC: 1.9 UIU/ML (ref 0.45–4.5)
VLDLC SERPL CALC-MCNC: 13 MG/DL (ref 5–40)
WBC # BLD AUTO: 7.5 X10E3/UL (ref 3.4–10.8)

## 2023-11-30 ENCOUNTER — OFFICE VISIT (OUTPATIENT)
Dept: FAMILY MEDICINE CLINIC | Facility: CLINIC | Age: 63
End: 2023-11-30
Payer: MEDICARE

## 2023-11-30 VITALS
HEIGHT: 62 IN | TEMPERATURE: 95 F | RESPIRATION RATE: 18 BRPM | BODY MASS INDEX: 27.97 KG/M2 | WEIGHT: 152 LBS | SYSTOLIC BLOOD PRESSURE: 124 MMHG | HEART RATE: 69 BPM | DIASTOLIC BLOOD PRESSURE: 78 MMHG | OXYGEN SATURATION: 98 %

## 2023-11-30 DIAGNOSIS — K21.9 GERD WITHOUT ESOPHAGITIS: ICD-10-CM

## 2023-11-30 DIAGNOSIS — E66.3 OVERWEIGHT WITH BODY MASS INDEX (BMI) OF 27 TO 27.9 IN ADULT: ICD-10-CM

## 2023-11-30 DIAGNOSIS — Z78.0 POST-MENOPAUSAL: ICD-10-CM

## 2023-11-30 DIAGNOSIS — E78.00 ELEVATED LDL CHOLESTEROL LEVEL: ICD-10-CM

## 2023-11-30 DIAGNOSIS — E83.42 HYPOMAGNESEMIA: ICD-10-CM

## 2023-11-30 DIAGNOSIS — M25.50 MULTIPLE JOINT PAIN: ICD-10-CM

## 2023-11-30 DIAGNOSIS — Z00.00 ENCOUNTER FOR SUBSEQUENT ANNUAL WELLNESS VISIT IN MEDICARE PATIENT: Primary | ICD-10-CM

## 2023-11-30 DIAGNOSIS — E03.9 ACQUIRED HYPOTHYROIDISM: ICD-10-CM

## 2023-11-30 DIAGNOSIS — F41.9 ANXIETY: ICD-10-CM

## 2023-11-30 DIAGNOSIS — E55.9 VITAMIN D DEFICIENCY: ICD-10-CM

## 2023-11-30 DIAGNOSIS — J30.2 SEASONAL ALLERGIES: ICD-10-CM

## 2023-11-30 NOTE — PROGRESS NOTES
"Chief Complaint   Patient presents with    Medicare Wellness-subsequent        Subjective   Denisa Michelle is a 63 y.o. female who presents today for ***    HPI       Allergies   Allergen Reactions    Clindamycin/Lincomycin Hives and Rash         OBJECTIVE:  Vitals:    11/30/23 0809   BP: 124/78   Pulse: 69   Resp: 18   Temp: 95 °F (35 °C)   TempSrc: Temporal   SpO2: 98%   Weight: 68.9 kg (152 lb)   Height: 157.5 cm (62\")     Physical Exam  Vitals and nursing note reviewed.   Constitutional:       Appearance: Normal appearance.   HENT:      Head: Normocephalic and atraumatic.      Right Ear: Tympanic membrane, ear canal and external ear normal.      Left Ear: Tympanic membrane, ear canal and external ear normal.      Nose: Nose normal.      Mouth/Throat:      Mouth: Mucous membranes are moist.      Pharynx: Oropharynx is clear.   Eyes:      Extraocular Movements: Extraocular movements intact.      Pupils: Pupils are equal, round, and reactive to light.   Cardiovascular:      Rate and Rhythm: Normal rate and regular rhythm.      Pulses: Normal pulses.      Heart sounds: Normal heart sounds.   Pulmonary:      Effort: Pulmonary effort is normal.      Breath sounds: Normal breath sounds.   Abdominal:      General: Abdomen is flat. Bowel sounds are normal.      Palpations: Abdomen is soft.   Musculoskeletal:         General: Normal range of motion.      Cervical back: Normal range of motion and neck supple.   Skin:     General: Skin is warm and dry.      Capillary Refill: Capillary refill takes less than 2 seconds.   Neurological:      General: No focal deficit present.      Mental Status: She is alert and oriented to person, place, and time.   Psychiatric:         Mood and Affect: Mood normal.         Behavior: Behavior normal.         Thought Content: Thought content normal.         Judgment: Judgment normal.                {Ambulatory Labs (Optional):94635}    ASSESSMENT/ PLAN:    There are no diagnoses linked to this " encounter.  Procedures     Management Plan:     An After Visit Summary was printed and given to the patient at discharge.    Follow-up: No follow-ups on file.    {2021TIMESPENTOPTIONAL (Optional):32556}     Shelli Da Silva, APRN 11/30/2023 08:16 CST  This note was electronically signed.

## 2023-11-30 NOTE — PROGRESS NOTES
The ABCs of the Annual Wellness Visit  Subsequent Medicare Wellness Visit    Subjective      Denisa Michelle is a 63 y.o. female who presents for a Subsequent Medicare Wellness Visit.    The following portions of the patient's history were reviewed and   updated as appropriate: allergies, current medications, past family history, past medical history, past social history, past surgical history, and problem list.    Compared to one year ago, the patient feels her physical   health is the same.    Compared to one year ago, the patient feels her mental   health is the same.    Recent Hospitalizations:  She was not admitted to the hospital during the last year.       Current Medical Providers:  Patient Care Team:  Shelli Da Silva APRN as PCP - General (Family Medicine)    Outpatient Medications Prior to Visit   Medication Sig Dispense Refill    ALPRAZolam (XANAX) 1 MG tablet Take 1 tablet by mouth 2 (two) times a day. 2 tabs BID with an additional 2 tabs at night      atorvastatin (LIPITOR) 40 MG tablet TAKE ONE TABLET BY MOUTH AT BEDTIME 90 tablet 3    azelastine (ASTELIN) 0.1 % nasal spray 2 sprays into the nostril(s) as directed by provider 2 (Two) Times a Day. Use in each nostril as directed 30 mL 12    bisacodyl (Dulcolax) 10 MG suppository Insert 1 suppository into the rectum 2 (Two) Times a Day. 15 suppository 3    busPIRone (BUSPAR) 30 MG tablet Take 1.5 tablets by mouth 2 (Two) Times a Day. Patient takes 45 mg BID      HYDROcodone-acetaminophen (NORCO)  MG per tablet Take 1 tablet by mouth Every 4 (Four) Hours As Needed for Moderate Pain. 30 tablet 0    levothyroxine (SYNTHROID, LEVOTHROID) 50 MCG tablet Take 1 tablet by mouth Daily. 90 tablet 3    Magnesium 500 MG tablet Take 750 mg by mouth every night at bedtime.      melatonin 5 MG tablet tablet Take 8 tablets by mouth Every Night.      nitroglycerin (NITROSTAT) 0.4 MG SL tablet Place 1 tablet under the tongue Every 5 (Five) Minutes As Needed for  Chest Pain. Take no more than 3 doses in 15 minutes.      omeprazole (priLOSEC) 20 MG capsule TAKE ONE CAPSULE BY MOUTH TWICE A  capsule 4    prednisoLONE acetate (Pred Forte) 1 % ophthalmic suspension Administer 2 drops into the left eye 4 (Four) Times a Day. (Patient taking differently: Administer 2 drops into the left eye As Needed.) 10 mL 3    pregabalin (LYRICA) 150 MG capsule Take 1 capsule by mouth Daily. 75 MG in the morning and 150 MG at night 90 capsule 1    traZODone (DESYREL) 50 MG tablet Take 2 tablets by mouth Every Night.      vitamin D (ERGOCALCIFEROL) 1.25 MG (61509 UT) capsule capsule TAKE ONE CAPSULE BY MOUTH ONCE WEEKLY 12 capsule 4    Claritin-D 24 Hour  MG per 24 hr tablet Take 1 tablet by mouth Daily for 120 days. 20 tablet 5     No facility-administered medications prior to visit.       Opioid medication/s are on active medication list.  and I have evaluated her active treatment plan and pain score trends (see table).  Vitals:    11/30/23 0809   PainSc:   8     I have reviewed the chart for potential of high risk medication and harmful drug interactions in the elderly.          Aspirin is not on active medication list.  Aspirin use is indicated based on review of current medical condition/s. Pros and cons of this therapy have been discussed with this patient. Benefits of this medication outweigh potential harm.  Patient has been instructed to start taking this medication..    Patient Active Problem List   Diagnosis    Sinus node dysfunction    Chest pain in adult    Anxiety    Mixed hyperlipidemia    Major depressive disorder, recurrent, moderate    GERD without esophagitis    Primary insomnia    Acquired hypothyroidism    Left hip pain    Right thigh pain    Seasonal allergies    Multiple joint pain    DDD (degenerative disc disease), lumbar    Overweight (BMI 25.0-29.9)    Pacemaker lead malfunction     Advance Care Planning   Advance Care Planning     Advance Directive is not  "on file.  ACP discussion was declined by the patient. Patient does not have an advance directive, declines further assistance.     Objective    Vitals:    23 0809   BP: 124/78   Pulse: 69   Resp: 18   Temp: 95 °F (35 °C)   TempSrc: Temporal   SpO2: 98%   Weight: 68.9 kg (152 lb)   Height: 157.5 cm (62\")   PainSc:   8     Estimated body mass index is 27.8 kg/m² as calculated from the following:    Height as of this encounter: 157.5 cm (62\").    Weight as of this encounter: 68.9 kg (152 lb).           Does the patient have evidence of cognitive impairment?   No    Lab Results   Component Value Date    CHLPL 146 2023    TRIG 67 2023    HDL 65 2023    LDL 68 2023    VLDL 13 2023          HEALTH RISK ASSESSMENT    Smoking Status:  Social History     Tobacco Use   Smoking Status Every Day    Packs/day: 1.00    Years: 30.00    Additional pack years: 0.00    Total pack years: 30.00    Types: Cigarettes    Start date: 1992   Smokeless Tobacco Current     Alcohol Consumption:  Social History     Substance and Sexual Activity   Alcohol Use Never     Fall Risk Screen:    ELIZA Fall Risk Assessment was completed, and patient is at MODERATE risk for falls. Assessment completed on:2023    Depression Screenin/30/2023     8:07 AM   PHQ-2/PHQ-9 Depression Screening   Little Interest or Pleasure in Doing Things 0-->not at all   Feeling Down, Depressed or Hopeless 0-->not at all   PHQ-9: Brief Depression Severity Measure Score 0       Health Habits and Functional and Cognitive Screenin/30/2023     8:06 AM   Functional & Cognitive Status   Do you have difficulty preparing food and eating? No   Do you have difficulty bathing yourself, getting dressed or grooming yourself? No   Do you have difficulty using the toilet? No   Do you have difficulty moving around from place to place? No   Do you have trouble with steps or getting out of a bed or a chair? No   Current Diet Well " Balanced Diet   Dental Exam Up to date   Eye Exam Up to date   Exercise (times per week) 7 times per week   Current Exercises Include Walking   Do you need help using the phone?  No   Are you deaf or do you have serious difficulty hearing?  No   Do you need help to go to places out of walking distance? No   Do you need help shopping? No   Do you need help preparing meals?  No   Do you need help with housework?  No   Do you need help with laundry? No   Do you need help taking your medications? No   Do you need help managing money? No   Do you ever drive or ride in a car without wearing a seat belt? No   Have you felt unusual stress, anger or loneliness in the last month? No   Who do you live with? Alone   If you need help, do you have trouble finding someone available to you? No   Have you been bothered in the last four weeks by sexual problems? No   Do you have difficulty concentrating, remembering or making decisions? No       Age-appropriate Screening Schedule:  Refer to the list below for future screening recommendations based on patient's age, sex and/or medical conditions. Orders for these recommended tests are listed in the plan section. The patient has been provided with a written plan.    Health Maintenance   Topic Date Due    Pneumococcal Vaccine 65+ (1 - PCV) Never done    LUNG CANCER SCREENING  Never done    PAP SMEAR  Never done    DXA SCAN  11/18/2022    INFLUENZA VACCINE  08/01/2023    COVID-19 Vaccine (5 - 2023-24 season) 09/01/2023    ANNUAL WELLNESS VISIT  11/21/2023    COLORECTAL CANCER SCREENING  09/08/2024    MAMMOGRAM  09/20/2024    BMI FOLLOWUP  10/09/2024    LIPID PANEL  11/28/2024    TDAP/TD VACCINES (2 - Td or Tdap) 03/14/2033    HEPATITIS C SCREENING  Completed    ZOSTER VACCINE  Completed              Lab Choices: CBC:  Lab Results - Last 18 Months   Lab Units 11/28/23  0915 07/05/23  1004 02/14/23  0931 11/18/22  0806 07/13/22  0903   WBC x10E3/uL 7.5 7.3 7.2 5.7 5.8   HEMOGLOBIN g/dL  14.9 15.2 14.3 14.6 13.4   HEMATOCRIT % 43.7 44.7 41.2 44.3 40.1   PLATELETS x10E3/uL 243 284 259 252 272      BMP/CMP:  Lab Results - Last 18 Months   Lab Units 11/28/23  0915 07/05/23  1004 02/14/23  0931 11/18/22  0806 10/31/22  0749 07/13/22  0903   SODIUM mmol/L 141 138 133* 134  --  137   POTASSIUM mmol/L 4.2 4.2 4.3 4.5  --  4.2   CHLORIDE mmol/L 102 99 97 99  --  103   CO2 mmol/L 24 22 18* 21  --  23   GLUCOSE mg/dL 85 87 144* 85  --  93   BUN mg/dL 12 9 12 7*  --  9   CREATININE mg/dL 0.99 0.95 1.25* 1.11* 1.10 1.03*   EGFR RESULT mL/min/1.73 63 67 48* 56*  --  61   CALCIUM mg/dL 8.9 9.4 8.9 9.2  --  9.1     Blood work reviewed and discussed with patient  Physical Exam  Vitals and nursing note reviewed.   Constitutional:       Appearance: Normal appearance.   HENT:      Head: Normocephalic and atraumatic.      Right Ear: Tympanic membrane, ear canal and external ear normal.      Left Ear: Tympanic membrane, ear canal and external ear normal.      Nose: Nose normal.      Mouth/Throat:      Mouth: Mucous membranes are moist.      Pharynx: Oropharynx is clear.   Eyes:      Extraocular Movements: Extraocular movements intact.      Pupils: Pupils are equal, round, and reactive to light.   Cardiovascular:      Rate and Rhythm: Normal rate and regular rhythm.      Pulses: Normal pulses.      Heart sounds: Normal heart sounds.   Pulmonary:      Effort: Pulmonary effort is normal.      Breath sounds: Normal breath sounds.   Abdominal:      General: Abdomen is flat. Bowel sounds are normal.      Palpations: Abdomen is soft.   Musculoskeletal:         General: Normal range of motion.      Cervical back: Normal range of motion and neck supple.   Skin:     General: Skin is warm and dry.      Capillary Refill: Capillary refill takes less than 2 seconds.   Neurological:      General: No focal deficit present.      Mental Status: She is alert and oriented to person, place, and time.   Psychiatric:         Mood and Affect:  Mood normal.         Behavior: Behavior normal.         Thought Content: Thought content normal.         Judgment: Judgment normal.        BMI is >= 25 and <30. (Overweight) The following options were offered after discussion;: weight loss educational material (shared in after visit summary), exercise counseling/recommendations, and nutrition counseling/recommendations   CMS Preventative Services Quick Reference  Risk Factors Identified During Encounter:    Depression/Dysphoria:  Sees mental health  Fall Risk-High or Moderate: Discussed Fall Prevention in the home  Immunizations Discussed/Encouraged: Influenza, Prevnar 20 (Pneumococcal 20-valent conjugate), and COVID19  Inactivity/Sedentary: Patient was advised to exercise at least 150 minutes a week per CDC recommendations.  Polypharmacy: Medication List reviewed and Medications are appropriate for patient  Tobacco Use/Dependance Risk (use dotphrase .tobaccocessation for documentation)    The above risks/problems have been discussed with the patient.  Pertinent information has been shared with the patient in the After Visit Summary.    Diagnoses and all orders for this visit:    1. Encounter for subsequent annual wellness visit in Medicare patient (Primary)    2. Need for influenza vaccination  -     Fluzone High-Dose 65+yrs (0206-4070)    3. Need for pneumococcal 20-valent conjugate vaccination  -     Pneumococcal Conjugate Vaccine 20-Valent (PCV20)    4. Post-menopausal  -     DEXA Bone Density Axial; Future    5. Elevated LDL cholesterol level    6. Acquired hypothyroidism    7. Multiple joint pain    8. Anxiety    9. GERD without esophagitis    10. Vitamin D deficiency    11. Seasonal allergies    12. Hypomagnesemia    13. Overweight with body mass index (BMI) of 27 to 27.9 in adult        Follow Up:   Next Medicare Wellness visit to be scheduled in 1 year.      An After Visit Summary and PPPS were made available to the patient.

## 2023-12-01 ENCOUNTER — CLINICAL SUPPORT (OUTPATIENT)
Dept: FAMILY MEDICINE CLINIC | Facility: CLINIC | Age: 63
End: 2023-12-01
Payer: MEDICARE

## 2023-12-01 DIAGNOSIS — Z23 NEED FOR PROPHYLACTIC VACCINATION WITH STREPTOCOCCUS PNEUMONIAE (PNEUMOCOCCUS) AND INFLUENZA VACCINES: Primary | ICD-10-CM

## 2023-12-07 ENCOUNTER — OFFICE VISIT (OUTPATIENT)
Dept: FAMILY MEDICINE CLINIC | Facility: CLINIC | Age: 63
End: 2023-12-07
Payer: MEDICARE

## 2023-12-07 VITALS
RESPIRATION RATE: 18 BRPM | OXYGEN SATURATION: 98 % | BODY MASS INDEX: 28.16 KG/M2 | WEIGHT: 153 LBS | HEIGHT: 62 IN | SYSTOLIC BLOOD PRESSURE: 120 MMHG | TEMPERATURE: 97 F | DIASTOLIC BLOOD PRESSURE: 72 MMHG | HEART RATE: 94 BPM

## 2023-12-07 DIAGNOSIS — M54.50 ACUTE BILATERAL LOW BACK PAIN WITHOUT SCIATICA: Primary | ICD-10-CM

## 2023-12-07 DIAGNOSIS — M25.50 MULTIPLE JOINT PAIN: ICD-10-CM

## 2023-12-07 RX ORDER — PREGABALIN 150 MG/1
150 CAPSULE ORAL NIGHTLY
Qty: 90 CAPSULE | Refills: 2 | Status: SHIPPED | OUTPATIENT
Start: 2023-12-07

## 2023-12-07 RX ORDER — HYDROCODONE BITARTRATE AND ACETAMINOPHEN 10; 325 MG/1; MG/1
1 TABLET ORAL EVERY 4 HOURS PRN
Qty: 30 TABLET | Refills: 0 | Status: SHIPPED | OUTPATIENT
Start: 2023-12-07

## 2023-12-07 NOTE — PROGRESS NOTES
"Chief Complaint   Patient presents with    Back Pain     Fallen twice this week         Subjective   Denisa Michelle is a 63 y.o. female who presents today for falls x 2.    HPI   She fell over a dog 2 weeks ago on her right hip and is having some discomfort, although she states she is better than when it happened.   This morning she fell out of bed onto a hard plastic trash can and now is having severe low back pain.     Allergies   Allergen Reactions    Clindamycin/Lincomycin Hives and Rash         OBJECTIVE:  Vitals:    12/07/23 0858   BP: 120/72   Pulse: 94   Resp: 18   Temp: 97 °F (36.1 °C)   TempSrc: Temporal   SpO2: 98%   Weight: 69.4 kg (153 lb)   Height: 157.5 cm (62\")     Physical Exam  Musculoskeletal:      Lumbar back: Spasms and tenderness present. Decreased range of motion.      Right hip: Tenderness and crepitus present. Decreased range of motion.      Comments: Ambulating slowly. Pain on both sides of lumbar spine. Difficulty changing positions.                     ASSESSMENT/ PLAN:    Diagnoses and all orders for this visit:    1. Acute bilateral low back pain without sciatica (Primary)  -     XR Spine Lumbar 2 or 3 View; Future  -     HYDROcodone-acetaminophen (NORCO)  MG per tablet; Take 1 tablet by mouth Every 4 (Four) Hours As Needed for Moderate Pain.  Dispense: 30 tablet; Refill: 0    2. Multiple joint pain  -     HYDROcodone-acetaminophen (NORCO)  MG per tablet; Take 1 tablet by mouth Every 4 (Four) Hours As Needed for Moderate Pain.  Dispense: 30 tablet; Refill: 0  -     pregabalin (LYRICA) 150 MG capsule; Take 1 capsule by mouth Every Night. 150 MG at night  Dispense: 90 capsule; Refill: 2      Procedures     Management Plan:   Further orders will be determined when the imaging results return.   An After Visit Summary was printed and given to the patient at discharge.    Follow-up: Return for Recheck via phone with imaging results.         Shelli Da Silva, APRN 12/7/2023 09:35 " CST  This note was electronically signed.

## 2023-12-13 DIAGNOSIS — M54.50 ACUTE BILATERAL LOW BACK PAIN WITHOUT SCIATICA: ICD-10-CM

## 2023-12-26 ENCOUNTER — OFFICE VISIT (OUTPATIENT)
Dept: FAMILY MEDICINE CLINIC | Facility: CLINIC | Age: 63
End: 2023-12-26
Payer: MEDICARE

## 2023-12-26 VITALS
HEIGHT: 62 IN | HEART RATE: 71 BPM | WEIGHT: 153 LBS | OXYGEN SATURATION: 97 % | SYSTOLIC BLOOD PRESSURE: 108 MMHG | TEMPERATURE: 97.8 F | BODY MASS INDEX: 28.16 KG/M2 | DIASTOLIC BLOOD PRESSURE: 65 MMHG

## 2023-12-26 DIAGNOSIS — M54.50 ACUTE BILATERAL LOW BACK PAIN WITHOUT SCIATICA: Primary | ICD-10-CM

## 2023-12-26 DIAGNOSIS — R39.15 URINARY URGENCY: ICD-10-CM

## 2023-12-26 PROCEDURE — 1159F MED LIST DOCD IN RCRD: CPT | Performed by: NURSE PRACTITIONER

## 2023-12-26 PROCEDURE — 3044F HG A1C LEVEL LT 7.0%: CPT | Performed by: NURSE PRACTITIONER

## 2023-12-26 PROCEDURE — 99213 OFFICE O/P EST LOW 20 MIN: CPT | Performed by: NURSE PRACTITIONER

## 2023-12-26 PROCEDURE — 1160F RVW MEDS BY RX/DR IN RCRD: CPT | Performed by: NURSE PRACTITIONER

## 2023-12-26 RX ORDER — HYDROCODONE BITARTRATE AND ACETAMINOPHEN 10; 325 MG/1; MG/1
1 TABLET ORAL EVERY 4 HOURS PRN
Qty: 30 TABLET | Refills: 0 | Status: SHIPPED | OUTPATIENT
Start: 2023-12-26

## 2023-12-26 NOTE — PROGRESS NOTES
"Chief Complaint   Patient presents with    Fall     Patient fell Saturday, hurt right side of back and upper thigh     Urinary Tract Infection     Frequency and urgency         Subjective   Denisa Michelle is a 63 y.o. female who presents today for pain after fall two days ago.    HPI   She sat in the backyard in a lawn chair watching her dog. She had her hands full getting into her house and fell and twisted her back. She has been using a heating pad and taking some muscle relaxers which has seemed to help. She had some norco as well but ran out today.   She has felt over the past 3-4 weeks she might have a UTI. She has polyuria but no dysuria. She has frequent urgency and has to get to the bathroom quickly.     Allergies   Allergen Reactions    Clindamycin/Lincomycin Hives and Rash         OBJECTIVE:  Vitals:    12/26/23 1333   BP: 108/65   BP Location: Left arm   Patient Position: Sitting   Cuff Size: Large Adult   Pulse: 71   Temp: 97.8 °F (36.6 °C)   SpO2: 97%   Weight: 69.4 kg (153 lb)   Height: 157.5 cm (62\")     Physical Exam  Vitals and nursing note reviewed.   Constitutional:       Appearance: Normal appearance.   Cardiovascular:      Rate and Rhythm: Normal rate and regular rhythm.      Pulses: Normal pulses.      Heart sounds: Normal heart sounds.   Pulmonary:      Effort: Pulmonary effort is normal.      Breath sounds: Normal breath sounds.   Musculoskeletal:      Lumbar back: Spasms and tenderness present. Decreased range of motion.      Comments: Low back pain with movement.   Skin:     General: Skin is warm and dry.   Neurological:      General: No focal deficit present.      Mental Status: She is alert and oriented to person, place, and time.   Psychiatric:         Mood and Affect: Mood normal.         Behavior: Behavior normal.         Thought Content: Thought content normal.         Judgment: Judgment normal.                    ASSESSMENT/ PLAN:    Diagnoses and all orders for this visit:    1. " Acute bilateral low back pain without sciatica (Primary)  -     HYDROcodone-acetaminophen (NORCO)  MG per tablet; Take 1 tablet by mouth Every 4 (Four) Hours As Needed for Moderate Pain.  Dispense: 30 tablet; Refill: 0    2. Urinary urgency  -     Urinalysis With Microscopic - Urine, Clean Catch      Procedures     Management Plan:   Urine will be sent off and result should return tomorrow. Future orders will be determined by results.   An After Visit Summary was printed and given to the patient at discharge.    Follow-up: Return if symptoms worsen or fail to improve.         NEW Trivedi 12/26/2023 14:02 CST  This note was electronically signed.

## 2023-12-27 DIAGNOSIS — B37.31 VAGINAL CANDIDIASIS: ICD-10-CM

## 2023-12-27 DIAGNOSIS — N30.01 ACUTE CYSTITIS WITH HEMATURIA: Primary | ICD-10-CM

## 2023-12-27 LAB
APPEARANCE UR: ABNORMAL
BACTERIA #/AREA URNS HPF: ABNORMAL /[HPF]
BILIRUB UR QL STRIP: NEGATIVE
CASTS URNS QL MICRO: ABNORMAL /LPF
COLOR UR: YELLOW
EPI CELLS #/AREA URNS HPF: ABNORMAL /HPF (ref 0–10)
GLUCOSE UR QL STRIP: NEGATIVE
HGB UR QL STRIP: ABNORMAL
KETONES UR QL STRIP: NEGATIVE
LEUKOCYTE ESTERASE UR QL STRIP: ABNORMAL
MICRO URNS: ABNORMAL
MUCOUS THREADS URNS QL MICRO: PRESENT
NITRITE UR QL STRIP: POSITIVE
PH UR STRIP: 6 [PH] (ref 5–7.5)
PROT UR QL STRIP: NEGATIVE
RBC #/AREA URNS HPF: ABNORMAL /HPF (ref 0–2)
SP GR UR STRIP: 1.01 (ref 1–1.03)
UROBILINOGEN UR STRIP-MCNC: 0.2 MG/DL (ref 0.2–1)
WBC #/AREA URNS HPF: >30 /HPF (ref 0–5)

## 2023-12-27 RX ORDER — LEVOFLOXACIN 750 MG/1
750 TABLET, FILM COATED ORAL DAILY
Qty: 10 TABLET | Refills: 0 | Status: SHIPPED | OUTPATIENT
Start: 2023-12-27

## 2023-12-27 RX ORDER — FLUCONAZOLE 150 MG/1
150 TABLET ORAL ONCE
Qty: 1 TABLET | Refills: 0 | Status: SHIPPED | OUTPATIENT
Start: 2023-12-27 | End: 2023-12-27

## 2024-01-04 ENCOUNTER — OFFICE VISIT (OUTPATIENT)
Dept: FAMILY MEDICINE CLINIC | Facility: CLINIC | Age: 64
End: 2024-01-04
Payer: MEDICARE

## 2024-01-04 VITALS
RESPIRATION RATE: 18 BRPM | HEIGHT: 62 IN | WEIGHT: 156.8 LBS | DIASTOLIC BLOOD PRESSURE: 65 MMHG | OXYGEN SATURATION: 98 % | SYSTOLIC BLOOD PRESSURE: 108 MMHG | BODY MASS INDEX: 28.85 KG/M2 | TEMPERATURE: 97 F | HEART RATE: 87 BPM

## 2024-01-04 DIAGNOSIS — M54.9 MID BACK PAIN: ICD-10-CM

## 2024-01-04 DIAGNOSIS — L90.5 FACIAL SCAR: ICD-10-CM

## 2024-01-04 DIAGNOSIS — R35.0 URINE FREQUENCY: ICD-10-CM

## 2024-01-04 DIAGNOSIS — M54.50 ACUTE BILATERAL LOW BACK PAIN WITHOUT SCIATICA: Primary | ICD-10-CM

## 2024-01-04 LAB
BILIRUB BLD-MCNC: NEGATIVE MG/DL
CLARITY, POC: ABNORMAL
COLOR UR: YELLOW
GLUCOSE UR STRIP-MCNC: NEGATIVE MG/DL
KETONES UR QL: ABNORMAL
LEUKOCYTE EST, POC: NEGATIVE
NITRITE UR-MCNC: NEGATIVE MG/ML
PH UR: 6.5 [PH] (ref 5–8)
PROT UR STRIP-MCNC: ABNORMAL MG/DL
RBC # UR STRIP: NEGATIVE /UL
SP GR UR: 1.03 (ref 1–1.03)
UROBILINOGEN UR QL: ABNORMAL

## 2024-01-04 PROCEDURE — 81003 URINALYSIS AUTO W/O SCOPE: CPT | Performed by: NURSE PRACTITIONER

## 2024-01-04 PROCEDURE — 99213 OFFICE O/P EST LOW 20 MIN: CPT | Performed by: NURSE PRACTITIONER

## 2024-01-04 PROCEDURE — 1160F RVW MEDS BY RX/DR IN RCRD: CPT | Performed by: NURSE PRACTITIONER

## 2024-01-04 PROCEDURE — 1159F MED LIST DOCD IN RCRD: CPT | Performed by: NURSE PRACTITIONER

## 2024-01-04 RX ORDER — HYDROCODONE BITARTRATE AND ACETAMINOPHEN 10; 325 MG/1; MG/1
1 TABLET ORAL EVERY 4 HOURS PRN
Qty: 30 TABLET | Refills: 0 | Status: SHIPPED | OUTPATIENT
Start: 2024-01-04 | End: 2024-01-04

## 2024-01-04 RX ORDER — TRIAMCINOLONE ACETONIDE 40 MG/ML
80 INJECTION, SUSPENSION INTRA-ARTICULAR; INTRAMUSCULAR ONCE
Status: COMPLETED | OUTPATIENT
Start: 2024-01-04 | End: 2024-01-04

## 2024-01-04 RX ORDER — FLUCONAZOLE 150 MG/1
TABLET ORAL
COMMUNITY
Start: 2023-12-27 | End: 2024-01-04

## 2024-01-04 RX ORDER — HYDROCODONE BITARTRATE AND ACETAMINOPHEN 5; 325 MG/1; MG/1
1 TABLET ORAL EVERY 4 HOURS PRN
Qty: 30 TABLET | Refills: 0 | Status: SHIPPED | OUTPATIENT
Start: 2024-01-04

## 2024-01-04 RX ADMIN — TRIAMCINOLONE ACETONIDE 80 MG: 40 INJECTION, SUSPENSION INTRA-ARTICULAR; INTRAMUSCULAR at 15:19

## 2024-01-04 NOTE — PROGRESS NOTES
"Chief Complaint   Patient presents with    Back Pain        Subjective   Denisa Michelle is a 63 y.o. female who presents today for mid to lower back pain.    HPI   She has fallen recently and yesterday she was up doing more activity and now feels like something is tearing in her back. She is having trouble ambulating and has loss of ROM with movement. The pain is equally distributed with no sciatica.   She would like to be referred to a plastic surgeon for a scar she has lateral to left nostril from a punch biopsy that was benign. It had been stitched and there was a single stitch left that took some time to remove.     Allergies   Allergen Reactions    Clindamycin/Lincomycin Hives and Rash         OBJECTIVE:  Vitals:    01/04/24 1354   BP: 108/65   Pulse: 87   Resp: 18   Temp: 97 °F (36.1 °C)   TempSrc: Temporal   SpO2: 98%   Weight: 71.1 kg (156 lb 12.8 oz)   Height: 157.5 cm (62\")     Physical Exam  HENT:      Head:        Comments: Scar lateral to left nostril approximately 0.5 cm vertical scar.  Musculoskeletal:      Thoracic back: Spasms and tenderness present. Decreased range of motion.      Lumbar back: Spasms and tenderness present. Decreased range of motion.                    ASSESSMENT/ PLAN:    Diagnoses and all orders for this visit:    1. Acute bilateral low back pain without sciatica (Primary)  -     HYDROcodone-acetaminophen (NORCO)  MG per tablet; Take 1 tablet by mouth Every 4 (Four) Hours As Needed for Moderate Pain.  Dispense: 30 tablet; Refill: 0  -     triamcinolone acetonide (KENALOG-40) injection 80 mg    2. Mid back pain  -     HYDROcodone-acetaminophen (NORCO)  MG per tablet; Take 1 tablet by mouth Every 4 (Four) Hours As Needed for Moderate Pain.  Dispense: 30 tablet; Refill: 0  -     triamcinolone acetonide (KENALOG-40) injection 80 mg    3. Urine frequency  -     POCT urinalysis dipstick, multipro    4. Facial scar  -     Ambulatory Referral to Plastic Surgery      Procedures "     Management Plan:   Try heat for relief.   An After Visit Summary was printed and given to the patient at discharge.    Follow-up: Return for Recheck if no better and imaging will be considered.         Shelli Da Silva, APRN 1/4/2024 14:31 CST  This note was electronically signed.

## 2024-01-05 DIAGNOSIS — M25.50 MULTIPLE JOINT PAIN: ICD-10-CM

## 2024-01-05 NOTE — TELEPHONE ENCOUNTER
Rx Refill Note  Requested Prescriptions     Pending Prescriptions Disp Refills    pregabalin (LYRICA) 150 MG capsule [Pharmacy Med Name: PREGABALIN 150MG CAPS] 90 capsule 1     Sig: TAKE ONE CAPSULE BY MOUTH EVERY EVENING      Last office visit with prescribing clinician: 1/4/2024         Matilde Bueno MA  01/05/24, 13:47 CST

## 2024-01-08 DIAGNOSIS — M54.50 ACUTE BILATERAL LOW BACK PAIN WITHOUT SCIATICA: ICD-10-CM

## 2024-01-08 DIAGNOSIS — M54.9 MID BACK PAIN: ICD-10-CM

## 2024-01-08 RX ORDER — PREGABALIN 150 MG/1
150 CAPSULE ORAL EVERY EVENING
Qty: 90 CAPSULE | Refills: 1 | Status: SHIPPED | OUTPATIENT
Start: 2024-01-08

## 2024-01-08 NOTE — TELEPHONE ENCOUNTER
Caller: Denisa Michelle EUFEMIA    Relationship: Self    Best call back number: [phone]     Requested Prescriptions:   Requested Prescriptions     Pending Prescriptions Disp Refills    HYDROcodone-acetaminophen (Norco) 5-325 MG per tablet 30 tablet 0     Sig: Take 1 tablet by mouth Every 4 (Four) Hours As Needed for Severe Pain.        Pharmacy where request should be sent: [facility] [address] [phone] [facility] [phone]      Last office visit with prescribing clinician: 1/4/2024   Last telemedicine visit with prescribing clinician: Visit date not found   Next office visit with prescribing clinician: Visit date not found     Additional details provided by patient:     Does the patient have less than a 3 day supply:  [] Yes  [] No    Would you like a call back once the refill request has been completed: [] Yes [] No    If the office needs to give you a call back, can they leave a voicemail: [] Yes [] No    Flash Rosa Rep   01/08/24 13:50 CST        From: Andrez Larios  Sent: 11/29/2021 9:09 AM CST  To: Hugo Hollis Md ~ [facility] Clinical Support Pool  Subject: Vit D deficiency and subclinical hypothyroidism    Oh, do I need to fast for this appointment?

## 2024-01-09 ENCOUNTER — OFFICE VISIT (OUTPATIENT)
Dept: FAMILY MEDICINE CLINIC | Facility: CLINIC | Age: 64
End: 2024-01-09
Payer: MEDICARE

## 2024-01-09 VITALS
SYSTOLIC BLOOD PRESSURE: 128 MMHG | HEIGHT: 63 IN | HEART RATE: 79 BPM | WEIGHT: 151.8 LBS | OXYGEN SATURATION: 94 % | BODY MASS INDEX: 26.9 KG/M2 | DIASTOLIC BLOOD PRESSURE: 76 MMHG | TEMPERATURE: 97.7 F

## 2024-01-09 DIAGNOSIS — K59.1 FUNCTIONAL DIARRHEA: ICD-10-CM

## 2024-01-09 DIAGNOSIS — M54.50 ACUTE RIGHT-SIDED LOW BACK PAIN WITHOUT SCIATICA: Primary | ICD-10-CM

## 2024-01-09 PROCEDURE — 1159F MED LIST DOCD IN RCRD: CPT | Performed by: NURSE PRACTITIONER

## 2024-01-09 PROCEDURE — 1160F RVW MEDS BY RX/DR IN RCRD: CPT | Performed by: NURSE PRACTITIONER

## 2024-01-09 PROCEDURE — 96372 THER/PROPH/DIAG INJ SC/IM: CPT | Performed by: NURSE PRACTITIONER

## 2024-01-09 PROCEDURE — 99213 OFFICE O/P EST LOW 20 MIN: CPT | Performed by: NURSE PRACTITIONER

## 2024-01-09 RX ORDER — HYDROCODONE BITARTRATE AND ACETAMINOPHEN 7.5; 325 MG/1; MG/1
1 TABLET ORAL EVERY 4 HOURS PRN
Qty: 30 TABLET | Refills: 0 | Status: SHIPPED | OUTPATIENT
Start: 2024-01-09

## 2024-01-09 RX ORDER — KETOROLAC TROMETHAMINE 30 MG/ML
60 INJECTION, SOLUTION INTRAMUSCULAR; INTRAVENOUS ONCE
Status: COMPLETED | OUTPATIENT
Start: 2024-01-09 | End: 2024-01-09

## 2024-01-09 RX ORDER — HYDROCODONE BITARTRATE AND ACETAMINOPHEN 5; 325 MG/1; MG/1
1 TABLET ORAL EVERY 4 HOURS PRN
Qty: 30 TABLET | Refills: 0 | OUTPATIENT
Start: 2024-01-09

## 2024-01-09 RX ORDER — DIPHENOXYLATE HYDROCHLORIDE AND ATROPINE SULFATE 2.5; .025 MG/1; MG/1
1 TABLET ORAL 4 TIMES DAILY PRN
Qty: 40 TABLET | Refills: 2 | Status: SHIPPED | OUTPATIENT
Start: 2024-01-09

## 2024-01-09 RX ADMIN — KETOROLAC TROMETHAMINE 60 MG: 30 INJECTION, SOLUTION INTRAMUSCULAR; INTRAVENOUS at 13:25

## 2024-01-09 NOTE — TELEPHONE ENCOUNTER
Rx Refill Note  Requested Prescriptions     Pending Prescriptions Disp Refills    diphenoxylate-atropine (LOMOTIL) 2.5-0.025 MG per tablet [Pharmacy Med Name: DIPHENOXYLATE-ATROPI 2.5-0.025 TABS] 40 tablet 2     Sig: TAKE ONE TABLET BY MOUTH FOUR TIMES A DAY AS NEEDED FOR DIARRHEA          Imani Campos MA  01/09/24, 08:45 CST   Original script dc

## 2024-01-09 NOTE — PROGRESS NOTES
"Chief Complaint   Patient presents with    Back Pain        Subjective   Denisa Michelle is a 63 y.o. female who presents today for low back pain.     HPI   She was seen here on 1/4/2024 for the same problem but now the pain is worse and she is having trouble getting up and around. The pain is now more on the right lower back today. She has run out of pain medications and would like a refill. She states the Norco 5 mg leaves a metallic taste in her mouth and is hard to take. She has had this before with other medications so I am familiar with this complaint. This did not occur with [place] [facility], but the pharmacy is out of stock.   She has also had a couple of episodes of diarrhea with diaphoresis that she has requested lomotil for. That was sent to the pharmacy already. This is not a new complaint but the diarrhea resolves with the lomotil. She describes it as pure watery stool with normal color.    Allergies   Allergen Reactions    Clindamycin/Lincomycin Hives and Rash         OBJECTIVE:  Vitals:    01/09/24 1254   BP: 128/76   BP Location: Left arm   Patient Position: Sitting   Cuff Size: Large Adult   Pulse: 79   Temp: 97.7 °F (36.5 °C)   SpO2: 94%   Weight: 68.9 kg (151 lb 12.8 oz)   Height: 158.8 cm (62.5\")     Physical Exam  Vitals and nursing note reviewed.   Constitutional:       Appearance: Normal appearance.   Cardiovascular:      Rate and Rhythm: Normal rate and regular rhythm.      Pulses: Normal pulses.      Heart sounds: Normal heart sounds.   Pulmonary:      Effort: Pulmonary effort is normal.      Breath sounds: Normal breath sounds.   Abdominal:      General: Abdomen is flat. Bowel sounds are increased.      Palpations: Abdomen is soft.   Musculoskeletal:        Back:       Comments: Pain with movement and on palpation right lower back. LROM.   Difficulty ambulating and getting up from a sitting position.    Skin:     General: Skin is warm and dry.   Neurological:      General: No focal deficit present. "      Mental Status: She is alert and oriented to person, place, and time.   Psychiatric:         Mood and Affect: Mood normal.         Behavior: Behavior normal.         Thought Content: Thought content normal.         Judgment: Judgment normal.                    ASSESSMENT/ PLAN:    Diagnoses and all orders for this visit:    1. Acute right-sided low back pain without sciatica (Primary)  -     MRI Lumbar Spine Without Contrast; Future  -     HYDROcodone-acetaminophen (NORCO) 7.5-325 MG per tablet; Take 1 tablet by mouth Every 4 (Four) Hours As Needed for Moderate Pain (low back pain). Ok to fill  Dispense: 30 tablet; Refill: 0  -     ketorolac (TORADOL) injection 60 mg    2. Functional diarrhea      Procedures     Management Plan:   If diarrhea continues will check a c.diff and try bentyl or levsin.   An After Visit Summary was printed and given to the patient at discharge.    Follow-up: Return for Recheck after MRI..         Shelli Da Silva, APRN 1/9/2024 13:20 CST  This note was electronically signed.

## 2024-01-15 ENCOUNTER — OFFICE VISIT (OUTPATIENT)
Dept: FAMILY MEDICINE CLINIC | Facility: CLINIC | Age: 64
End: 2024-01-15
Payer: MEDICARE

## 2024-01-15 VITALS
RESPIRATION RATE: 18 BRPM | HEIGHT: 63 IN | HEART RATE: 77 BPM | WEIGHT: 151 LBS | DIASTOLIC BLOOD PRESSURE: 79 MMHG | OXYGEN SATURATION: 98 % | TEMPERATURE: 97 F | BODY MASS INDEX: 26.75 KG/M2 | SYSTOLIC BLOOD PRESSURE: 140 MMHG

## 2024-01-15 DIAGNOSIS — M54.50 ACUTE RIGHT-SIDED LOW BACK PAIN WITHOUT SCIATICA: Primary | ICD-10-CM

## 2024-01-15 PROCEDURE — 1160F RVW MEDS BY RX/DR IN RCRD: CPT | Performed by: NURSE PRACTITIONER

## 2024-01-15 PROCEDURE — 1159F MED LIST DOCD IN RCRD: CPT | Performed by: NURSE PRACTITIONER

## 2024-01-15 PROCEDURE — 99213 OFFICE O/P EST LOW 20 MIN: CPT | Performed by: NURSE PRACTITIONER

## 2024-01-15 RX ORDER — HYDROCODONE BITARTRATE AND ACETAMINOPHEN 10; 325 MG/1; MG/1
1 TABLET ORAL EVERY 4 HOURS PRN
Qty: 30 TABLET | Refills: 0 | Status: SHIPPED | OUTPATIENT
Start: 2024-01-15 | End: 2024-01-22 | Stop reason: SDUPTHER

## 2024-01-15 NOTE — PROGRESS NOTES
"Chief Complaint   Patient presents with    Pain     Back pain         Subjective   Denisa Michelle is a 63 y.o. female who presents today for severe low back pain and right iliac crest pain.     HPI   She is still suffering severe back pain after a fall December 23, 2023. She has an MRI order pending and they have all her information including her pacemaker information.   She is especially sore over the right iliac crest. She is pending getting an MRI L-spine done.     Allergies   Allergen Reactions    Clindamycin/Lincomycin Hives and Rash         OBJECTIVE:  Vitals:    01/15/24 1032   BP: 140/79   Pulse: 77   Resp: 18   Temp: 97 °F (36.1 °C)   TempSrc: Temporal   SpO2: 98%   Weight: 68.5 kg (151 lb)   Height: 158.8 cm (62.5\")     Physical Exam  Musculoskeletal:      Lumbar back: Signs of trauma, spasms and tenderness present. Decreased range of motion.        Back:       Comments: Pain over iliac crest area                     ASSESSMENT/ PLAN:    Diagnoses and all orders for this visit:    1. Acute right-sided low back pain without sciatica (Primary)    Other orders  -     HYDROcodone-acetaminophen (NORCO)  MG per tablet; Take 1 tablet by mouth Every 4 (Four) Hours As Needed for Moderate Pain or Severe Pain.  Dispense: 30 tablet; Refill: 0      Procedures     Management Plan:   Will await MRI scheduling and will determine further orders.   An After Visit Summary was printed and given to the patient at discharge.    Follow-up: Return if symptoms worsen or fail to improve.         Shelli Da Silva, APRN 1/15/2024 12:16 CST  This note was electronically signed.          "

## 2024-01-22 ENCOUNTER — OFFICE VISIT (OUTPATIENT)
Dept: FAMILY MEDICINE CLINIC | Facility: CLINIC | Age: 64
End: 2024-01-22
Payer: MEDICARE

## 2024-01-22 VITALS
SYSTOLIC BLOOD PRESSURE: 130 MMHG | RESPIRATION RATE: 18 BRPM | HEART RATE: 84 BPM | DIASTOLIC BLOOD PRESSURE: 80 MMHG | OXYGEN SATURATION: 98 % | BODY MASS INDEX: 27.97 KG/M2 | WEIGHT: 152 LBS | TEMPERATURE: 97 F | HEIGHT: 62 IN

## 2024-01-22 DIAGNOSIS — M54.50 ACUTE RIGHT-SIDED LOW BACK PAIN WITHOUT SCIATICA: Primary | ICD-10-CM

## 2024-01-22 PROCEDURE — 99213 OFFICE O/P EST LOW 20 MIN: CPT | Performed by: NURSE PRACTITIONER

## 2024-01-22 RX ORDER — HYDROCODONE BITARTRATE AND ACETAMINOPHEN 10; 325 MG/1; MG/1
1 TABLET ORAL EVERY 4 HOURS PRN
Qty: 30 TABLET | Refills: 0 | Status: SHIPPED | OUTPATIENT
Start: 2024-01-22 | End: 2024-01-29 | Stop reason: SDUPTHER

## 2024-01-22 NOTE — PROGRESS NOTES
"Chief Complaint   Patient presents with    Pain     Back          Subjective   Denisa Michelle is a 63 y.o. female who presents today for continued lower back pain.     HPI   She has been seen on 1/9/2024 and 1/15/2024. Her original fall was on 12/23/2023. An MRI has been ordered but she has to have her pacemaker adjusted prior to getting the study done. That will be done today.     Allergies   Allergen Reactions    Clindamycin/Lincomycin Hives and Rash         OBJECTIVE:  Vitals:    01/22/24 0909   BP: 130/80   Pulse: 84   Resp: 18   Temp: 97 °F (36.1 °C)   TempSrc: Temporal   SpO2: 98%   Weight: 68.9 kg (152 lb)   Height: 157.5 cm (62\")     Physical Exam  Vitals and nursing note reviewed.   Constitutional:       Appearance: Normal appearance.   Cardiovascular:      Rate and Rhythm: Normal rate and regular rhythm.      Pulses: Normal pulses.      Heart sounds: Normal heart sounds.   Pulmonary:      Effort: Pulmonary effort is normal.      Breath sounds: Normal breath sounds.   Musculoskeletal:      Lumbar back: Spasms and tenderness present. Decreased range of motion.      Comments: Tenderness inferior to right iliac crest with evidence of ecchymosis. This extends around the right hip.    Skin:     General: Skin is warm and dry.   Neurological:      General: No focal deficit present.      Mental Status: She is alert and oriented to person, place, and time.   Psychiatric:         Mood and Affect: Mood normal.         Behavior: Behavior normal.         Thought Content: Thought content normal.         Judgment: Judgment normal.       Patient has been erroneously marked as diabetic. Based on the available clinical information, she does not have diabetes and should therefore be excluded from diabetic health maintenance and quality measures for the remainder of the reporting period.              ASSESSMENT/ PLAN:    Diagnoses and all orders for this visit:    1. Acute right-sided low back pain without sciatica (Primary)  - "     HYDROcodone-acetaminophen (NORCO)  MG per tablet; Take 1 tablet by mouth Every 4 (Four) Hours As Needed for Moderate Pain or Severe Pain.  Dispense: 30 tablet; Refill: 0      Procedures     Management Plan:   Continue with MRI order. If negative, will consider other imaging options.   An After Visit Summary was printed and given to the patient at discharge.    Follow-up: Return if symptoms worsen or fail to improve.         Shelli Da Silva, APRN 1/22/2024 09:40 CST  This note was electronically signed.

## 2024-01-29 ENCOUNTER — TELEPHONE (OUTPATIENT)
Dept: FAMILY MEDICINE CLINIC | Facility: CLINIC | Age: 64
End: 2024-01-29

## 2024-01-29 ENCOUNTER — OFFICE VISIT (OUTPATIENT)
Dept: FAMILY MEDICINE CLINIC | Facility: CLINIC | Age: 64
End: 2024-01-29
Payer: MEDICARE

## 2024-01-29 VITALS
SYSTOLIC BLOOD PRESSURE: 120 MMHG | TEMPERATURE: 97.5 F | DIASTOLIC BLOOD PRESSURE: 66 MMHG | WEIGHT: 152 LBS | OXYGEN SATURATION: 98 % | HEART RATE: 106 BPM | HEIGHT: 62 IN | BODY MASS INDEX: 27.97 KG/M2

## 2024-01-29 DIAGNOSIS — K59.00 CONSTIPATION, UNSPECIFIED CONSTIPATION TYPE: ICD-10-CM

## 2024-01-29 DIAGNOSIS — M54.50 ACUTE RIGHT-SIDED LOW BACK PAIN WITHOUT SCIATICA: Primary | ICD-10-CM

## 2024-01-29 PROCEDURE — 1159F MED LIST DOCD IN RCRD: CPT | Performed by: NURSE PRACTITIONER

## 2024-01-29 PROCEDURE — 1160F RVW MEDS BY RX/DR IN RCRD: CPT | Performed by: NURSE PRACTITIONER

## 2024-01-29 PROCEDURE — 99213 OFFICE O/P EST LOW 20 MIN: CPT | Performed by: NURSE PRACTITIONER

## 2024-01-29 RX ORDER — HYDROCODONE BITARTRATE AND ACETAMINOPHEN 7.5; 325 MG/1; MG/1
1 TABLET ORAL EVERY 4 HOURS PRN
Qty: 30 TABLET | Refills: 0 | Status: SHIPPED | OUTPATIENT
Start: 2024-01-29

## 2024-01-29 RX ORDER — BISACODYL 10 MG
10 SUPPOSITORY, RECTAL RECTAL DAILY
Qty: 10 SUPPOSITORY | Refills: 0 | Status: SHIPPED | OUTPATIENT
Start: 2024-01-29

## 2024-01-29 RX ORDER — HYDROCODONE BITARTRATE AND ACETAMINOPHEN 10; 325 MG/1; MG/1
1 TABLET ORAL EVERY 4 HOURS PRN
Qty: 30 TABLET | Refills: 0 | Status: SHIPPED | OUTPATIENT
Start: 2024-01-29 | End: 2024-01-29

## 2024-01-29 NOTE — TELEPHONE ENCOUNTER
Caller: Denisa Michelle    Relationship to patient: Self    Best call back number: [phone]    Patient is needing: TO HAVE     HYDROcodone-acetaminophen (NORCO)  MG per tablet    SWITCHED TO 7.5, STATES THAT PHARMACY [facility] NOT HAVE MUCH LEFT ON THE 10 MG.

## 2024-01-29 NOTE — TELEPHONE ENCOUNTER
Rx Refill Note  Requested Prescriptions      No prescriptions requested or ordered in this encounter      Last office visit with prescribing clinician: 1/29/2024         Matilde Bueno MA  01/29/24, 16:02 CST

## 2024-01-29 NOTE — PROGRESS NOTES
"Chief Complaint   Patient presents with    Back Pain        Subjective   Denisa Michelle is a 63 y.o. female who presents today for low back pain with radiation to right hip.    HPI   She has been having this pain since having a fall 12/23/2023. An MRI had been ordered but due to her pacemaker, it has taken longer than normal to get it done. She has had her pacemaker checked and should be scheduled soon.   She has been having constipation over the past week or so after dealing with bouts of diarrhea. She has been taking pain medication.     Allergies   Allergen Reactions    Clindamycin/Lincomycin Hives and Rash         OBJECTIVE:  Vitals:    01/29/24 1031   BP: 120/66   Pulse: 106   Temp: 97.5 °F (36.4 °C)   SpO2: 98%   Weight: 68.9 kg (152 lb)   Height: 157.5 cm (62\")     Physical Exam  Vitals and nursing note reviewed.   Constitutional:       Appearance: Normal appearance.   Cardiovascular:      Rate and Rhythm: Normal rate and regular rhythm.      Pulses: Normal pulses.      Heart sounds: Normal heart sounds.   Pulmonary:      Effort: Pulmonary effort is normal.      Breath sounds: Normal breath sounds.   Musculoskeletal:      Comments: Low right sided back pain radiating to right hip.    Skin:     General: Skin is warm and dry.   Neurological:      General: No focal deficit present.      Mental Status: She is alert and oriented to person, place, and time.   Psychiatric:         Mood and Affect: Mood normal.         Behavior: Behavior normal.         Thought Content: Thought content normal.         Judgment: Judgment normal.                    ASSESSMENT/ PLAN:    Diagnoses and all orders for this visit:    1. Acute right-sided low back pain without sciatica (Primary)  -     HYDROcodone-acetaminophen (NORCO)  MG per tablet; Take 1 tablet by mouth Every 4 (Four) Hours As Needed for Moderate Pain or Severe Pain.  Dispense: 30 tablet; Refill: 0    2. Constipation, unspecified constipation type  -     bisacodyl " (DULCOLAX) 10 MG suppository; Insert 1 suppository into the rectum Daily.  Dispense: 10 suppository; Refill: 0      Procedures     Management Plan:   She is to notify me when she is scheduled for her MRI. Take pain medication sparingly.  An After Visit Summary was printed and given to the patient at discharge.    Follow-up: Return if symptoms worsen or fail to improve.         Shelli Da Silva, APRN 1/29/2024 10:54 CST  This note was electronically signed.

## 2024-01-30 ENCOUNTER — PATIENT ROUNDING (BHMG ONLY) (OUTPATIENT)
Dept: FAMILY MEDICINE CLINIC | Facility: CLINIC | Age: 64
End: 2024-01-30
Payer: MEDICARE

## 2024-01-30 NOTE — PROGRESS NOTES
"January 30, 2024    Hello, may I speak with Denisa Michelle?    My name is Millicent    I am  with [facility]  [address] [id number] [phone].    Before we get started may I verify your date of birth? 1960    I am calling to officially welcome you to our practice and ask about your recent visit. Is this a good time to talk? yes    Tell me about your visit with us. What things went well?  \"everything went very well\"       We're always looking for ways to make our patients' experiences even better. Do you have recommendations on ways we may improve?  no    Overall were you satisfied with your first visit to our practice? yes       I appreciate you taking the time to speak with me today. Is there anything else I can do for you? no      Thank you, and have a great day.      "

## 2024-01-31 ENCOUNTER — DOCUMENTATION (OUTPATIENT)
Dept: CARDIOLOGY | Facility: CLINIC | Age: 64
End: 2024-01-31
Payer: MEDICARE

## 2024-02-06 ENCOUNTER — OFFICE VISIT (OUTPATIENT)
Dept: FAMILY MEDICINE CLINIC | Facility: CLINIC | Age: 64
End: 2024-02-06
Payer: MEDICARE

## 2024-02-06 VITALS
RESPIRATION RATE: 18 BRPM | HEART RATE: 70 BPM | OXYGEN SATURATION: 98 % | SYSTOLIC BLOOD PRESSURE: 120 MMHG | WEIGHT: 158 LBS | DIASTOLIC BLOOD PRESSURE: 70 MMHG | HEIGHT: 62 IN | TEMPERATURE: 97 F | BODY MASS INDEX: 29.08 KG/M2

## 2024-02-06 DIAGNOSIS — J34.0 CELLULITIS OF NOSE: Primary | ICD-10-CM

## 2024-02-06 DIAGNOSIS — M54.50 ACUTE RIGHT-SIDED LOW BACK PAIN WITHOUT SCIATICA: ICD-10-CM

## 2024-02-06 DIAGNOSIS — K04.7 ABSCESSED TOOTH: ICD-10-CM

## 2024-02-06 PROCEDURE — 99213 OFFICE O/P EST LOW 20 MIN: CPT | Performed by: NURSE PRACTITIONER

## 2024-02-06 PROCEDURE — 1159F MED LIST DOCD IN RCRD: CPT | Performed by: NURSE PRACTITIONER

## 2024-02-06 PROCEDURE — 1160F RVW MEDS BY RX/DR IN RCRD: CPT | Performed by: NURSE PRACTITIONER

## 2024-02-06 RX ORDER — HYDROCODONE BITARTRATE AND ACETAMINOPHEN 7.5; 325 MG/1; MG/1
1 TABLET ORAL EVERY 4 HOURS PRN
Qty: 30 TABLET | Refills: 0 | Status: SHIPPED | OUTPATIENT
Start: 2024-02-06

## 2024-02-06 RX ORDER — CEPHALEXIN 500 MG/1
500 CAPSULE ORAL 2 TIMES DAILY
Qty: 14 CAPSULE | Refills: 0 | Status: SHIPPED | OUTPATIENT
Start: 2024-02-06

## 2024-02-06 NOTE — PROGRESS NOTES
"Chief Complaint   Patient presents with    Back Pain    Dental Pain     Inside nose pain         Subjective   Denisa Michelle is a 63 y.o. female who presents today for low back pain, sore nose, and possible abscessed tooth.     HPI   This patient has been dealing with low back pain since a fall in December. We have been waiting on an MRI L-spine due to the fact that she has a pacemaker. She is following up with Dr. Duval in [place] to further evaluate the pacemaker and get the MRI scheduled. Her pain level today is a 6.  She is having nasal pain when she blows her nose and she is not sure if it is actually her nose or an abscessed tooth on the upper side. She sees a dentist Monday.     Allergies   Allergen Reactions    Clindamycin/Lincomycin Hives and Rash         OBJECTIVE:  Vitals:    02/06/24 0924   BP: 120/70   Pulse: 70   Resp: 18   Temp: 97 °F (36.1 °C)   TempSrc: Temporal   SpO2: 98%   Weight: 71.7 kg (158 lb)   Height: 157.5 cm (62\")     Physical Exam  HENT:      Nose:      Comments: Soft tissue edema both nares.      Mouth/Throat:      Comments: Pain and edema above 3rd upper left tooth.   Musculoskeletal:      Lumbar back: Spasms and tenderness present. Decreased range of motion.                    ASSESSMENT/ PLAN:    Diagnoses and all orders for this visit:    1. Cellulitis of nose (Primary)  -     cephalexin (Keflex) 500 MG capsule; Take 1 capsule by mouth 2 (Two) Times a Day.  Dispense: 14 capsule; Refill: 0    2. Acute right-sided low back pain without sciatica  -     HYDROcodone-acetaminophen (NORCO) 7.5-325 MG per tablet; Take 1 tablet by mouth Every 4 (Four) Hours As Needed for Moderate Pain.  Dispense: 30 tablet; Refill: 0    3. Abscessed tooth  -     cephalexin (Keflex) 500 MG capsule; Take 1 capsule by mouth 2 (Two) Times a Day.  Dispense: 14 capsule; Refill: 0      Procedures     Management Plan:   Further orders will be determined by imaging results.   An After Visit Summary was printed and " given to the patient at discharge.    Follow-up: Return if symptoms worsen or fail to improve.         Shelli Da Silva, APRN 2/6/2024 12:46 CST  This note was electronically signed.

## 2024-02-09 ENCOUNTER — OFFICE VISIT (OUTPATIENT)
Dept: CARDIOLOGY | Facility: CLINIC | Age: 64
End: 2024-02-09
Payer: MEDICARE

## 2024-02-09 ENCOUNTER — CLINICAL SUPPORT NO REQUIREMENTS (OUTPATIENT)
Dept: CARDIOLOGY | Facility: CLINIC | Age: 64
End: 2024-02-09
Payer: MEDICARE

## 2024-02-09 VITALS
WEIGHT: 156 LBS | DIASTOLIC BLOOD PRESSURE: 76 MMHG | HEART RATE: 62 BPM | SYSTOLIC BLOOD PRESSURE: 112 MMHG | BODY MASS INDEX: 28.71 KG/M2 | OXYGEN SATURATION: 98 % | HEIGHT: 62 IN

## 2024-02-09 DIAGNOSIS — Z95.0 PRESENCE OF CARDIAC PACEMAKER: Primary | ICD-10-CM

## 2024-02-09 DIAGNOSIS — M54.50 CHRONIC LOW BACK PAIN, UNSPECIFIED BACK PAIN LATERALITY, UNSPECIFIED WHETHER SCIATICA PRESENT: ICD-10-CM

## 2024-02-09 DIAGNOSIS — Z95.0 PACEMAKER: Primary | ICD-10-CM

## 2024-02-09 DIAGNOSIS — I49.5 SINUS NODE DYSFUNCTION: ICD-10-CM

## 2024-02-09 DIAGNOSIS — T82.110D PACEMAKER LEAD MALFUNCTION, SUBSEQUENT ENCOUNTER: ICD-10-CM

## 2024-02-09 DIAGNOSIS — G89.29 CHRONIC LOW BACK PAIN, UNSPECIFIED BACK PAIN LATERALITY, UNSPECIFIED WHETHER SCIATICA PRESENT: ICD-10-CM

## 2024-02-09 NOTE — PROGRESS NOTES
Dual Chamber Pacemaker Interrogation Report  IN OFFICE    February 9, 2024    Primary Cardiologist: Mukund  : [employer] Model: [employer] MRI EL L131  Implant date: 12.18.2020    Reason for evaluation: Provider Requested to evaluate device before approving MRI  Indication for pacemaker: Sinus Node Dysfunction    Interrogation performed by:  Jayne Shore, RN    Measurements  Atrial sensing - P wave: 3.1 mV  Atrial threshold: 0.5V@ 0.4ms  Atrial lead impedance: 699 ohms  Ventricular sensing - R wave: >25 mV  Ventricular threshold: 0.5 V @ 0.4 ms (unipolar), 1V @ 0.4ms (bipolar)  Ventricular lead impedance:   706 ohms (unipolar), 1372 ohms (bipolar)  - RV lead programmed unipolar pace/bipolar sense by Dr. Duval 9.22.2023 -     Manual sensing and threshold testing performed:  Yes    Diagnostic Data  Atrial paced: 35 %  Ventricular paced: <1 %    Episodes/Alerts: None.    Battery status: Satisfactory , estimated 12 years remaining      Final Parameters  Mode:  DDDR  Lower rate: 60 bpm   Upper rate: 130 bpm  AV Delay: paced- 220-300 ms  Gjsepy-229-963 ms  Atrial - Amplitude: 1.5 V   Pulse width: 0.4 ms   Sensitivity: 0.25 mV     Ventricular - Amplitude: 1.2 V  Pulse width: 0.4 ms  Sensitivity: 1.5 mV      Changes made: None.    Conclusions: Normal Pacemaker Function, Adequate Battery Reserve, and Hx of elevated RV lead impedance BIPOLAR    Remote Monitor:  Yes, followed by Dr. Stone at [facility]    Follow up: With Dr. Stone at [facility].     Final impression:  Data above reviewed.  Agree with findings and conclusions as per the above note.

## 2024-02-09 NOTE — PROGRESS NOTES
"[facility] -  CLINIC FOLLOW UP     Patient Care Team:  Shelli Da Silva APRN as PCP - General (Family Medicine)    Chief Complaint: discuss ,ri    Subjective   EP Problems:  1.  Sinus node dysfunction  2.  Presence of a cardiac pacemaker  -12/2020: DOI, dual-chamber [employer]     Cardiology Problems:  1.  Chest pain  2.  Hyperlipidemia     Medical Problems:  1.  GERD  2.  Anxiety  3.  DJD  4.  Insomnia  5.  Breast cancer  6.  Type 2 diabetes    HPI: Today I had the pleasure of seeing Denisa Michelle in the cardiology clinic for follow up. She is a 63 year old female with a history of dual chamber BSC PPM who established care with EP last fall for possible RV lead fracture. She had notes of a slow elevation in her pacing impedance over the course of approximately 3 months from May through July.  There was concern that there might be RV lead fracture as a result.  However, Dr. Duval noted elevatee ends of elevated impedence but no other significant evidence to suggest lead fracture.     She was reprogrammed to unipolar pacing from the RV lead to preserve battery life. Device or lead replacement was not indicated in September 2023. Furthermore, since she had never had AV block, he stated that lead replacement would not be necesseary even if she had had completely nonfunctional lead.     She states she fell real hard in December resulting from a lawn chair and a dog, which sounds all mechanical it sounds like. She felt like her back twisted. She has been on Norco since. Now she has been having a dental abscess possibly related to her front veneers.       Objective     Visit Vitals  /76   Pulse 62   Ht 157.5 cm (62.01\")   Wt 70.8 kg (156 lb)   SpO2 98%   BMI 28.53 kg/m²           Vitals reviewed.   Constitutional:       Appearance: Not in distress.   Eyes:      Extraocular Movements: Extraocular movements intact.      Conjunctiva/sclera: Conjunctivae normal.      Pupils: Pupils are " equal, round, and reactive to light.   HENT:      Head: Normocephalic and atraumatic.      Nose: Nose normal.    Mouth/Throat:      Lips: Pink.      Mouth: Mucous membranes are moist.      Pharynx: Oropharynx is clear.         Comments: Left frontal tooth swelling at gumline   Neck:      Vascular: No carotid bruit or JVD. JVD normal.   Pulmonary:      Effort: Pulmonary effort is normal.      Breath sounds: Normal breath sounds.   Chest:      Chest wall: Not tender to palpatation.   Cardiovascular:      PMI at left midclavicular line. Normal rate. Regular rhythm. Normal S1. Normal S2.       Murmurs: There is no murmur.      No gallop.  No rub.   Pulses:     Radial: 2+ bilaterally.  Edema:     Peripheral edema absent.   Abdominal:      Palpations: Abdomen is soft.   Musculoskeletal: Normal range of motion.      Extremities: No clubbing present.     Cervical back: Normal range of motion. Skin:     General: Skin is warm and dry.   Neurological:      General: No focal deficit present.      Mental Status: Alert and oriented to person, place, and time.   Psychiatric:         Attention and Perception: Attention normal.         Mood and Affect: Affect normal.         Speech: Speech normal.         Behavior: Behavior normal.         Cognition and Memory: Cognition normal.             The following portions of the patient's history were reviewed and updated as appropriate: allergies, current medications, past medical history, past social history, past and problem list.     Review of Systems   Constitutional: Negative.    HENT:  Positive for mouth sores and sinus pressure.    Eyes: Negative.    Respiratory: Negative.     Cardiovascular: Negative.    Gastrointestinal: Negative.    Endocrine: Negative.    Genitourinary: Negative.    Musculoskeletal: Negative.    Skin: Negative.    Allergic/Immunologic: Negative.    Neurological: Negative.    Hematological: Negative.    Psychiatric/Behavioral: Negative.                  ECG 12  Lead    Date/Time: 2/9/2024 8:55 AM  Performed by: Brenda Mullen PA    Authorized by: Brenda Mullen PA  Comparison: compared with previous ECG from 9/22/2023  Rhythm: sinus rhythm  Rhythm comments: atrially pacing            Medication Review: yes    Current Outpatient Medications:     ALPRAZolam (XANAX) 1 MG tablet, Take 1 tablet by mouth 2 (two) times a day. 2 tabs BID with an additional 2 tabs at night, Disp: , Rfl:     atorvastatin (LIPITOR) 40 MG tablet, TAKE ONE TABLET BY MOUTH AT BEDTIME, Disp: 90 tablet, Rfl: 3    azelastine (ASTELIN) 0.1 % nasal spray, 2 sprays into the nostril(s) as directed by provider 2 (Two) Times a Day. Use in each nostril as directed, Disp: 30 mL, Rfl: 12    bisacodyl (Dulcolax) 10 MG suppository, Insert 1 suppository into the rectum 2 (Two) Times a Day., Disp: 15 suppository, Rfl: 3    bisacodyl (DULCOLAX) 10 MG suppository, Insert 1 suppository into the rectum Daily., Disp: 10 suppository, Rfl: 0    busPIRone (BUSPAR) 30 MG tablet, Take 1.5 tablets by mouth 2 (Two) Times a Day. Patient takes 45 mg BID, Disp: , Rfl:     cephalexin (Keflex) 500 MG capsule, Take 1 capsule by mouth 2 (Two) Times a Day., Disp: 14 capsule, Rfl: 0    diphenoxylate-atropine (LOMOTIL) 2.5-0.025 MG per tablet, TAKE ONE TABLET BY MOUTH FOUR TIMES A DAY AS NEEDED FOR DIARRHEA, Disp: 40 tablet, Rfl: 2    HYDROcodone-acetaminophen (NORCO) 7.5-325 MG per tablet, Take 1 tablet by mouth Every 4 (Four) Hours As Needed for Moderate Pain., Disp: 30 tablet, Rfl: 0    levothyroxine (SYNTHROID, LEVOTHROID) 50 MCG tablet, Take 1 tablet by mouth Daily., Disp: 90 tablet, Rfl: 3    Magnesium 500 MG tablet, Take 750 mg by mouth every night at bedtime., Disp: , Rfl:     melatonin 5 MG tablet tablet, Take 8 tablets by mouth Every Night., Disp: , Rfl:     nitroglycerin (NITROSTAT) 0.4 MG SL tablet, Place 1 tablet under the tongue Every 5 (Five) Minutes As Needed for Chest Pain. Take no more than 3 doses in 15 minutes.,  "Disp: , Rfl:     omeprazole (priLOSEC) 20 MG capsule, TAKE ONE CAPSULE BY MOUTH TWICE A DAY, Disp: 180 capsule, Rfl: 4    prednisoLONE acetate (Pred Forte) 1 % ophthalmic suspension, Administer 2 drops into the left eye 4 (Four) Times a Day. (Patient taking differently: Administer 2 drops into the left eye As Needed.), Disp: 10 mL, Rfl: 3    pregabalin (LYRICA) 150 MG capsule, TAKE ONE CAPSULE BY MOUTH EVERY EVENING, Disp: 90 capsule, Rfl: 1    traZODone (DESYREL) 50 MG tablet, Take 2 tablets by mouth Every Night., Disp: , Rfl:     vitamin D (ERGOCALCIFEROL) 1.25 MG (56252 UT) capsule capsule, TAKE ONE CAPSULE BY MOUTH ONCE WEEKLY, Disp: 12 capsule, Rfl: 4    Claritin-D 24 Hour  MG per 24 hr tablet, Take 1 tablet by mouth Daily for 120 days., Disp: 20 tablet, Rfl: 5   Allergies   Allergen Reactions    Clindamycin/Lincomycin Hives and Rash       I have reviewed       CBC:  Lab Results - Last 18 Months   Lab Units 11/28/23  0915   WBC x10E3/uL 7.5   HEMOGLOBIN g/dL 14.9   HEMATOCRIT % 43.7   PLATELETS x10E3/uL 243      BMP/CMP:  Lab Results - Last 18 Months   Lab Units 11/28/23  0915   SODIUM mmol/L 141   POTASSIUM mmol/L 4.2   CHLORIDE mmol/L 102   CO2 mmol/L 24   GLUCOSE mg/dL 85   BUN mg/dL 12   CREATININE mg/dL 0.99   EGFR RESULT mL/min/1.73 63   CALCIUM mg/dL 8.9     BNP: No results for input(s): \"PROBNP\" in the last 25587 hours.   THYROID:  Lab Results - Last 18 Months   Lab Units 11/28/23  0915   TSH uIU/mL 1.900          Assessment:   Diagnoses and all orders for this visit:    1. Presence of cardiac pacemaker (Primary)  -     ECG 12 Lead; Future  -     ECG 12 Lead    2. Pacemaker lead malfunction, subsequent encounter    3. Sinus node dysfunction  Overview:  Added automatically from request for surgery [id number]      4. Chronic low back pain, unspecified back pain laterality, unspecified whether sciatica present    Dual chamber PPM: She RV paces less than 1% and is not dependent. She does not have a " history of AVB. Currently she is programmed unipolar, to preserve battery. During her upcoming MRI, she may have pacing turned off in MRI conditional mode.  She does not have any questions about this process.   -PPM is typically followed by Dr. Stone in [facility].   -Follow up with their clinic as scheduled.   -As previously stated in prior notes, no indication for lead extraction/replacement at this time.     Back pain: Acceptable for MRI with PPM.         I spent 30 minutes caring for Denisa on this date of service. This time includes time spent by me in the following activities:preparing for the visit, reviewing tests, obtaining and/or reviewing a separately obtained history, performing a medically appropriate examination and/or evaluation , counseling and educating the patient/family/caregiver, ordering medications, tests, or procedures, referring and communicating with other health care professionals , documenting information in the medical record, and independently interpreting results and communicating that information with the patient/family/caregiver        Electronically signed by STANTON Hernández

## 2024-02-21 ENCOUNTER — OFFICE VISIT (OUTPATIENT)
Dept: FAMILY MEDICINE CLINIC | Facility: CLINIC | Age: 64
End: 2024-02-21
Payer: MEDICARE

## 2024-02-21 VITALS
TEMPERATURE: 97 F | RESPIRATION RATE: 18 BRPM | DIASTOLIC BLOOD PRESSURE: 76 MMHG | BODY MASS INDEX: 28.71 KG/M2 | OXYGEN SATURATION: 98 % | SYSTOLIC BLOOD PRESSURE: 112 MMHG | WEIGHT: 156 LBS | HEART RATE: 74 BPM | HEIGHT: 62 IN

## 2024-02-21 DIAGNOSIS — K59.1 FUNCTIONAL DIARRHEA: ICD-10-CM

## 2024-02-21 DIAGNOSIS — J30.2 SEASONAL ALLERGIES: ICD-10-CM

## 2024-02-21 DIAGNOSIS — M54.50 ACUTE RIGHT-SIDED LOW BACK PAIN WITHOUT SCIATICA: Primary | ICD-10-CM

## 2024-02-21 PROCEDURE — 99213 OFFICE O/P EST LOW 20 MIN: CPT | Performed by: NURSE PRACTITIONER

## 2024-02-21 PROCEDURE — 1160F RVW MEDS BY RX/DR IN RCRD: CPT | Performed by: NURSE PRACTITIONER

## 2024-02-21 PROCEDURE — 1159F MED LIST DOCD IN RCRD: CPT | Performed by: NURSE PRACTITIONER

## 2024-02-21 RX ORDER — DICYCLOMINE HYDROCHLORIDE 10 MG/1
10 CAPSULE ORAL
Qty: 40 CAPSULE | Refills: 0 | Status: SHIPPED | OUTPATIENT
Start: 2024-02-21

## 2024-02-21 RX ORDER — LORATADINE PSEUDOEPHEDRINE SULFATE 10; 240 MG/1; MG/1
1 TABLET, EXTENDED RELEASE ORAL DAILY
Qty: 15 TABLET | Refills: 3 | Status: SHIPPED | OUTPATIENT
Start: 2024-02-21

## 2024-02-21 RX ORDER — HYDROCODONE BITARTRATE AND ACETAMINOPHEN 5; 325 MG/1; MG/1
1 TABLET ORAL EVERY 4 HOURS PRN
Qty: 15 TABLET | Refills: 0 | Status: SHIPPED | OUTPATIENT
Start: 2024-02-21

## 2024-02-21 NOTE — PROGRESS NOTES
"Chief Complaint   Patient presents with    Diarrhea    Pain     Lower back         Subjective   Denisa Michelle is a 63 y.o. female who presents today for diarrhea and low back pain.     HPI   She has been having diarrhea x 2 days that she is having a hard time controlling. She has no other GI complaints.   She continues to complain of low back pain and will finally get an MRI next week. [facility] had her get her pacemaker checked more than once in order to get the test. She does remain with low right back pain exacerbated by twisting movement.   She also needs a refill on her claritin d.    Allergies   Allergen Reactions    Clindamycin/Lincomycin Hives and Rash         OBJECTIVE:  Vitals:    02/21/24 1334   BP: 112/76   Pulse: 74   Resp: 18   Temp: 97 °F (36.1 °C)   TempSrc: Temporal   SpO2: 98%   Weight: 70.8 kg (156 lb)   Height: 157.5 cm (62.01\")     Physical Exam  Vitals and nursing note reviewed.   Constitutional:       Appearance: Normal appearance.   Cardiovascular:      Rate and Rhythm: Normal rate and regular rhythm.      Pulses: Normal pulses.      Heart sounds: Normal heart sounds.   Pulmonary:      Effort: Pulmonary effort is normal.      Breath sounds: Normal breath sounds.   Abdominal:      General: Abdomen is flat. Bowel sounds are normal.      Palpations: Abdomen is soft.   Musculoskeletal:      Lumbar back: Spasms and tenderness present. Decreased range of motion.   Skin:     General: Skin is warm and dry.   Neurological:      General: No focal deficit present.      Mental Status: She is alert and oriented to person, place, and time.   Psychiatric:         Mood and Affect: Mood normal.         Behavior: Behavior normal.         Thought Content: Thought content normal.         Judgment: Judgment normal.                    ASSESSMENT/ PLAN:    Diagnoses and all orders for this visit:    1. Acute right-sided low back pain without sciatica (Primary)  -     HYDROcodone-acetaminophen (Norco) 5-325 MG per " tablet; Take 1 tablet by mouth Every 4 (Four) Hours As Needed for Moderate Pain.  Dispense: 15 tablet; Refill: 0    2. Functional diarrhea  -     dicyclomine (BENTYL) 10 MG capsule; Take 1 capsule by mouth 4 (Four) Times a Day Before Meals & at Bedtime.  Dispense: 40 capsule; Refill: 0    3. Seasonal allergies  -     loratadine-pseudoephedrine (Claritin-D 24 Hour)  MG per 24 hr tablet; Take 1 tablet by mouth Daily.  Dispense: 15 tablet; Refill: 3      Procedures     Management Plan:     An After Visit Summary was printed and given to the patient at discharge.    Follow-up: Return in about 1 week (around 2/28/2024) for after MRI scan.         NEW Trivedi 2/21/2024 14:24 CST  This note was electronically signed.

## 2024-02-23 ENCOUNTER — PATIENT ROUNDING (BHMG ONLY) (OUTPATIENT)
Dept: FAMILY MEDICINE CLINIC | Facility: CLINIC | Age: 64
End: 2024-02-23
Payer: MEDICARE

## 2024-02-23 NOTE — PROGRESS NOTES
"February 23, 2024    Hello, may I speak with Denisa Michelle?    My name is [email or web address]    I am  with [facility]  [address] [id number] [phone].    Before we get started may I verify your date of birth? 1960    I am calling to officially welcome you to our practice and ask about your recent visit. Is this a good time to talk? yes    Tell me about your visit with us. What things went well? \"everything went very well\"       We're always looking for ways to make our patients' experiences even better. Do you have recommendations on ways we may improve?  no    Overall were you satisfied with your first visit to our practice? yes       I appreciate you taking the time to speak with me today. Is there anything else I can do for you? no      Thank you, and have a great day.      "

## 2024-02-26 ENCOUNTER — TELEPHONE (OUTPATIENT)
Dept: FAMILY MEDICINE CLINIC | Facility: CLINIC | Age: 64
End: 2024-02-26
Payer: MEDICARE

## 2024-02-26 DIAGNOSIS — K59.1 FUNCTIONAL DIARRHEA: ICD-10-CM

## 2024-02-26 RX ORDER — DIPHENOXYLATE HYDROCHLORIDE AND ATROPINE SULFATE 2.5; .025 MG/1; MG/1
1 TABLET ORAL 4 TIMES DAILY PRN
Qty: 40 TABLET | Refills: 2 | Status: SHIPPED | OUTPATIENT
Start: 2024-02-26

## 2024-02-26 NOTE — TELEPHONE ENCOUNTER
Rx Refill Note  Requested Prescriptions     Pending Prescriptions Disp Refills    diphenoxylate-atropine (LOMOTIL) 2.5-0.025 MG per tablet [Pharmacy Med Name: DIPHENOXYLATE-ATROPI 2.5-0.025 TABS] 40 tablet 2     Sig: TAKE ONE TABLET BY MOUTH FOUR TIMES A DAY AS NEEDED FOR DIARRHEA      Last office visit with prescribing clinician: 2/21/2024         Matilde Bueno MA  02/26/24, 07:39 CST

## 2024-02-28 ENCOUNTER — HOSPITAL ENCOUNTER (OUTPATIENT)
Dept: MRI IMAGING | Facility: HOSPITAL | Age: 64
Discharge: HOME | End: 2024-02-28
Admitting: NURSE PRACTITIONER
Payer: MEDICARE

## 2024-02-28 DIAGNOSIS — M54.50 ACUTE RIGHT-SIDED LOW BACK PAIN WITHOUT SCIATICA: ICD-10-CM

## 2024-02-28 PROCEDURE — 72148 MRI LUMBAR SPINE W/O DYE: CPT

## 2024-03-25 ENCOUNTER — OFFICE VISIT (OUTPATIENT)
Dept: FAMILY MEDICINE CLINIC | Facility: CLINIC | Age: 64
End: 2024-03-25
Payer: MEDICARE

## 2024-03-25 VITALS
BODY MASS INDEX: 28.71 KG/M2 | DIASTOLIC BLOOD PRESSURE: 78 MMHG | SYSTOLIC BLOOD PRESSURE: 114 MMHG | WEIGHT: 156 LBS | HEART RATE: 78 BPM | RESPIRATION RATE: 18 BRPM | OXYGEN SATURATION: 98 % | HEIGHT: 62 IN | TEMPERATURE: 96 F

## 2024-03-25 DIAGNOSIS — M54.50 ACUTE RIGHT-SIDED LOW BACK PAIN WITHOUT SCIATICA: Primary | ICD-10-CM

## 2024-03-25 PROCEDURE — 1160F RVW MEDS BY RX/DR IN RCRD: CPT | Performed by: NURSE PRACTITIONER

## 2024-03-25 PROCEDURE — 1159F MED LIST DOCD IN RCRD: CPT | Performed by: NURSE PRACTITIONER

## 2024-03-25 PROCEDURE — 99213 OFFICE O/P EST LOW 20 MIN: CPT | Performed by: NURSE PRACTITIONER

## 2024-03-25 RX ORDER — HYDROCODONE BITARTRATE AND ACETAMINOPHEN 5; 325 MG/1; MG/1
1 TABLET ORAL EVERY 4 HOURS PRN
Qty: 15 TABLET | Refills: 0 | Status: SHIPPED | OUTPATIENT
Start: 2024-03-25

## 2024-03-25 NOTE — PROGRESS NOTES
"Chief Complaint   Patient presents with    Follow-up     mri        Subjective   Denisa Michelle is a 63 y.o. female who presents today for low back pain.     HPI   She continues with mid to low back pain, more on the right. She is having difficulty laying on her right side due to pain. She would like to go over the results of her MRI.     Allergies   Allergen Reactions    Clindamycin/Lincomycin Hives and Rash         OBJECTIVE:  Vitals:    03/25/24 1034   BP: 114/78   Pulse: 78   Resp: 18   Temp: 96 °F (35.6 °C)   TempSrc: Temporal   SpO2: 98%   Weight: 70.8 kg (156 lb)   Height: 157.5 cm (62\")     Physical Exam  Vitals and nursing note reviewed.   Constitutional:       Appearance: Normal appearance.   Cardiovascular:      Rate and Rhythm: Normal rate and regular rhythm.      Pulses: Normal pulses.      Heart sounds: Normal heart sounds.   Pulmonary:      Effort: Pulmonary effort is normal.      Breath sounds: Normal breath sounds.   Musculoskeletal:      Lumbar back: Tenderness present. Decreased range of motion.      Comments: Tenderness to palpation right lower back.    Skin:     General: Skin is warm and dry.   Neurological:      General: No focal deficit present.      Mental Status: She is alert and oriented to person, place, and time.   Psychiatric:         Mood and Affect: Mood normal.         Behavior: Behavior normal.         Thought Content: Thought content normal.         Judgment: Judgment normal.     MRI L-spine results reviewed.               ASSESSMENT/ PLAN:    Diagnoses and all orders for this visit:    1. Acute right-sided low back pain without sciatica (Primary)  -     HYDROcodone-acetaminophen (Norco) 5-325 MG per tablet; Take 1 tablet by mouth Every 4 (Four) Hours As Needed for Moderate Pain.  Dispense: 15 tablet; Refill: 0      Procedures     Management Plan:   Take norco as ordered. Use ice or heat and NSAIDs for breakthrough pain.   An After Visit Summary was printed and given to the patient " at discharge.    Follow-up: Return if symptoms worsen or fail to improve.         Shelli Da Silva, APRN 3/25/2024 10:55 CDT  This note was electronically signed.

## 2024-03-26 RX ORDER — ATORVASTATIN CALCIUM 40 MG/1
40 TABLET, FILM COATED ORAL
Qty: 90 TABLET | Refills: 1 | Status: SHIPPED | OUTPATIENT
Start: 2024-03-26

## 2024-03-26 NOTE — TELEPHONE ENCOUNTER
Rx Refill Note  Requested Prescriptions     Pending Prescriptions Disp Refills    atorvastatin (LIPITOR) 40 MG tablet 90 tablet 3     Sig: Take 1 tablet by mouth every night at bedtime.      Last office visit with prescribing clinician: 3/25/2024   Last telemedicine visit with prescribing clinician:  Next office visit with prescribing clinician:   {    Matilde Bueno MA  03/26/24, 11:40 CDT

## 2024-04-02 ENCOUNTER — OFFICE VISIT (OUTPATIENT)
Dept: FAMILY MEDICINE CLINIC | Facility: CLINIC | Age: 64
End: 2024-04-02
Payer: MEDICARE

## 2024-04-02 VITALS
HEIGHT: 62 IN | OXYGEN SATURATION: 98 % | SYSTOLIC BLOOD PRESSURE: 118 MMHG | WEIGHT: 149 LBS | DIASTOLIC BLOOD PRESSURE: 78 MMHG | RESPIRATION RATE: 18 BRPM | TEMPERATURE: 96 F | HEART RATE: 78 BPM | BODY MASS INDEX: 27.42 KG/M2

## 2024-04-02 DIAGNOSIS — D48.9 NEOPLASM OF UNCERTAIN BEHAVIOR: ICD-10-CM

## 2024-04-02 DIAGNOSIS — R10.32 LEFT GROIN PAIN: Primary | ICD-10-CM

## 2024-04-02 DIAGNOSIS — M54.50 ACUTE RIGHT-SIDED LOW BACK PAIN WITHOUT SCIATICA: ICD-10-CM

## 2024-04-02 DIAGNOSIS — M25.50 MULTIPLE JOINT PAIN: ICD-10-CM

## 2024-04-02 RX ORDER — PREGABALIN 150 MG/1
150 CAPSULE ORAL EVERY EVENING
Qty: 90 CAPSULE | Refills: 1 | Status: SHIPPED | OUTPATIENT
Start: 2024-04-02

## 2024-04-02 RX ORDER — HYDROCODONE BITARTRATE AND ACETAMINOPHEN 10; 325 MG/1; MG/1
1 TABLET ORAL EVERY 6 HOURS PRN
Qty: 30 TABLET | Refills: 0 | Status: SHIPPED | OUTPATIENT
Start: 2024-04-02

## 2024-04-02 NOTE — PROGRESS NOTES
"Chief Complaint   Patient presents with    Groin Pain     Left groin area        Subjective   Denisa Michelle is a 63 y.o. female who presents today for left groin pain.    HPI   She has had severe left groin pain x 3-4 days. She has started walking her dog in town due to the warmer weather and feels this has attributed to her pain. She continues with low back pain with radiculopathy. She has had to take 2 norco 5-325 for relief.   She has a hard area that is palpable to touch under the skin on the right side at the level of the waistline. She is unsure how long it has been there but it has become painful.   She needs a refill of her lyrica for generalized body pain.     Allergies   Allergen Reactions    Clindamycin/Lincomycin Hives and Rash         OBJECTIVE:  Vitals:    04/02/24 0904   BP: 118/78   Pulse: 78   Resp: 18   Temp: 96 °F (35.6 °C)   TempSrc: Temporal   SpO2: 98%   Weight: 67.6 kg (149 lb)   Height: 157.5 cm (62\")     Physical Exam  Musculoskeletal:      Comments: Pain in the crease of the left groin. Limps when ambulating.   Pain on palpation mid low and right back.    Skin:     Comments: Hard uneven neoplasm under the skin on the right waistline. Nonfluctuant. Not easily moveable. Painful to palpation.                     ASSESSMENT/ PLAN:    Diagnoses and all orders for this visit:    1. Left groin pain (Primary)  -     HYDROcodone-acetaminophen (NORCO)  MG per tablet; Take 1 tablet by mouth Every 6 (Six) Hours As Needed for Moderate Pain.  Dispense: 30 tablet; Refill: 0    2. Multiple joint pain  -     pregabalin (LYRICA) 150 MG capsule; Take 1 capsule by mouth Every Evening.  Dispense: 90 capsule; Refill: 1    3. Acute right-sided low back pain without sciatica    4. Neoplasm of uncertain behavior      Procedures     Management Plan:   Will consider a biopsy after she completes dental work needed.   An After Visit Summary was printed and given to the patient at discharge.    Follow-up: Return " if symptoms worsen or fail to improve.         Shelli Da Silva, APRN 4/2/2024 11:38 CDT  This note was electronically signed.

## 2024-04-18 ENCOUNTER — OFFICE VISIT (OUTPATIENT)
Dept: FAMILY MEDICINE CLINIC | Facility: CLINIC | Age: 64
End: 2024-04-18
Payer: MEDICARE

## 2024-04-18 VITALS
SYSTOLIC BLOOD PRESSURE: 118 MMHG | DIASTOLIC BLOOD PRESSURE: 78 MMHG | RESPIRATION RATE: 18 BRPM | OXYGEN SATURATION: 98 % | HEIGHT: 62 IN | BODY MASS INDEX: 27.97 KG/M2 | WEIGHT: 152 LBS | HEART RATE: 78 BPM | TEMPERATURE: 96 F

## 2024-04-18 DIAGNOSIS — G89.29 CHRONIC MIDLINE LOW BACK PAIN WITHOUT SCIATICA: Primary | ICD-10-CM

## 2024-04-18 DIAGNOSIS — M54.50 CHRONIC MIDLINE LOW BACK PAIN WITHOUT SCIATICA: Primary | ICD-10-CM

## 2024-04-18 PROCEDURE — 99213 OFFICE O/P EST LOW 20 MIN: CPT | Performed by: NURSE PRACTITIONER

## 2024-04-18 RX ORDER — HYDROCODONE BITARTRATE AND ACETAMINOPHEN 7.5; 325 MG/1; MG/1
1 TABLET ORAL EVERY 6 HOURS PRN
Qty: 30 TABLET | Refills: 0 | Status: SHIPPED | OUTPATIENT
Start: 2024-04-18

## 2024-04-18 NOTE — PROGRESS NOTES
"Chief Complaint   Patient presents with    Back Pain    Leg Pain     Left leg        Subjective   Denisa Michelle is a 63 y.o. female who presents today for chronic lower back pain.     HPI   She has tried to walk more lately and is just having a lot of pain. She has difficulty standing for very long. She states that norco 10 does not seem to work as well as lower doses. Pain has moved from the right to more midline.     Allergies   Allergen Reactions    Clindamycin/Lincomycin Hives and Rash         OBJECTIVE:  Vitals:    04/18/24 0932   BP: 118/78   Pulse: 78   Resp: 18   Temp: 96 °F (35.6 °C)   TempSrc: Temporal   SpO2: 98%   Weight: 68.9 kg (152 lb)   Height: 157.5 cm (62\")     Physical Exam  Vitals and nursing note reviewed.   Constitutional:       Appearance: Normal appearance.   Cardiovascular:      Rate and Rhythm: Normal rate and regular rhythm.      Pulses: Normal pulses.      Heart sounds: Normal heart sounds.   Pulmonary:      Effort: Pulmonary effort is normal.      Breath sounds: Normal breath sounds.   Musculoskeletal:      Lumbar back: Spasms and tenderness present. Decreased range of motion.   Skin:     General: Skin is warm and dry.   Neurological:      General: No focal deficit present.      Mental Status: She is alert and oriented to person, place, and time.   Psychiatric:         Mood and Affect: Mood normal.         Behavior: Behavior normal.         Thought Content: Thought content normal.         Judgment: Judgment normal.                    ASSESSMENT/ PLAN:    Diagnoses and all orders for this visit:    1. Chronic midline low back pain without sciatica (Primary)  -     HYDROcodone-acetaminophen (NORCO) 7.5-325 MG per tablet; Take 1 tablet by mouth Every 6 (Six) Hours As Needed for Moderate Pain.  Dispense: 30 tablet; Refill: 0      Procedures     Management Plan:     An After Visit Summary was printed and given to the patient at discharge.    Follow-up: Return if symptoms worsen or fail to " improve.         Shelli Da Silva, APRN 4/18/2024 10:21 CDT  This note was electronically signed.

## 2024-04-29 ENCOUNTER — PROCEDURE VISIT (OUTPATIENT)
Dept: FAMILY MEDICINE CLINIC | Facility: CLINIC | Age: 64
End: 2024-04-29
Payer: MEDICARE

## 2024-04-29 VITALS
RESPIRATION RATE: 18 BRPM | DIASTOLIC BLOOD PRESSURE: 78 MMHG | OXYGEN SATURATION: 98 % | TEMPERATURE: 97 F | WEIGHT: 152 LBS | BODY MASS INDEX: 27.97 KG/M2 | SYSTOLIC BLOOD PRESSURE: 118 MMHG | HEART RATE: 78 BPM | HEIGHT: 62 IN

## 2024-04-29 DIAGNOSIS — R22.41 MASS OF RIGHT HIP REGION: Primary | ICD-10-CM

## 2024-04-29 DIAGNOSIS — Z12.31 ENCOUNTER FOR SCREENING MAMMOGRAM FOR MALIGNANT NEOPLASM OF BREAST: Primary | ICD-10-CM

## 2024-04-29 NOTE — PROGRESS NOTES
"Chief Complaint   Patient presents with    Procedure     Biopsy area on right hip         Subjective   Denisa Michelle is a 63 y.o. female who presents today for biopsy mass right hip.    HPI   She has a hard mass under the skin overlying the right hip of unknown age.     Allergies   Allergen Reactions    Clindamycin/Lincomycin Hives and Rash         OBJECTIVE:  Vitals:    04/29/24 0909   BP: 118/78   Pulse: 78   Resp: 18   Temp: 97 °F (36.1 °C)   SpO2: 98%   Weight: 68.9 kg (152 lb)   Height: 157.5 cm (62\")     Physical Exam  Skin:     Comments: Hard mass under the skin overlying the right hip.                     ASSESSMENT/ PLAN:    Diagnoses and all orders for this visit:    1. Mass of right hip region (Primary)    Other orders  -     Biopsy      Biopsy    Date/Time: 4/29/2024 10:57 AM    Performed by: Shelli Da Silva APRN  Authorized by: Shelli Da Silva APRN    Procedure Details - Skin Biopsy:     Body area: lower extremity    Lower extremity location: R hip    Initial size (mm): 25    Final defect size (mm): 30    Malignancy: malignancy unknown      Destruction method comment: Excision    Cosmetic: No.      Comments:   Skin prepped with betadine and lidocaine 1% with epi.. Elliptical incision made around mass. Mass excised in entirety. Skin closure with 4.0 nylon horizontal mattress stitches # 10. Specimen to pathology. Suture removal in 1 week.        Management Plan:   Suture removal in 1 week if healed well enough.   Specimen to pathology.   An After Visit Summary was printed and given to the patient at discharge.    Follow-up: No follow-ups on file.         NEW Trivedi 4/29/2024 11:03 CDT  This note was electronically signed.          "

## 2024-05-02 LAB
DX ICD CODE: NORMAL
PATH REPORT.FINAL DX SPEC: NORMAL
PATH REPORT.GROSS SPEC: NORMAL
PATH REPORT.SITE OF ORIGIN SPEC: NORMAL
PATHOLOGIST NAME: NORMAL
PAYMENT PROCEDURE: NORMAL

## 2024-05-06 ENCOUNTER — CLINICAL SUPPORT (OUTPATIENT)
Dept: FAMILY MEDICINE CLINIC | Facility: CLINIC | Age: 64
End: 2024-05-06
Payer: MEDICARE

## 2024-05-06 ENCOUNTER — PROCEDURE VISIT (OUTPATIENT)
Dept: FAMILY MEDICINE CLINIC | Facility: CLINIC | Age: 64
End: 2024-05-06
Payer: MEDICARE

## 2024-05-06 VITALS — DIASTOLIC BLOOD PRESSURE: 60 MMHG | OXYGEN SATURATION: 98 % | SYSTOLIC BLOOD PRESSURE: 118 MMHG | HEART RATE: 78 BPM

## 2024-05-06 DIAGNOSIS — S71.011D: Primary | ICD-10-CM

## 2024-05-06 PROCEDURE — 12002 RPR S/N/AX/GEN/TRNK2.6-7.5CM: CPT | Performed by: NURSE PRACTITIONER

## 2024-05-06 PROCEDURE — 99212 OFFICE O/P EST SF 10 MIN: CPT | Performed by: NURSE PRACTITIONER

## 2024-05-09 DIAGNOSIS — Z12.31 ENCOUNTER FOR SCREENING MAMMOGRAM FOR MALIGNANT NEOPLASM OF BREAST: ICD-10-CM

## 2024-05-13 NOTE — PROGRESS NOTES
Pt presented in office to have sutures removed from her right outer hip, half way through removal incision site seemed to be slightly open, pt was instructed to lay back and that I would have primary care provider look at incision site, once pt went to look at site she pulled on her skin surrounding incision and caused the site to open on second half that still had stitches, primary care provider was informed and took over care for pt and would suture the site back closed.

## 2024-05-14 ENCOUNTER — OFFICE VISIT (OUTPATIENT)
Dept: FAMILY MEDICINE CLINIC | Facility: CLINIC | Age: 64
End: 2024-05-14
Payer: MEDICARE

## 2024-05-14 VITALS
SYSTOLIC BLOOD PRESSURE: 117 MMHG | HEART RATE: 82 BPM | OXYGEN SATURATION: 95 % | BODY MASS INDEX: 27.97 KG/M2 | HEIGHT: 62 IN | WEIGHT: 152 LBS | TEMPERATURE: 97.7 F | DIASTOLIC BLOOD PRESSURE: 72 MMHG

## 2024-05-14 DIAGNOSIS — M25.552 LEFT HIP PAIN: ICD-10-CM

## 2024-05-14 DIAGNOSIS — M54.42 ACUTE LEFT-SIDED LOW BACK PAIN WITH LEFT-SIDED SCIATICA: ICD-10-CM

## 2024-05-14 DIAGNOSIS — J30.2 SEASONAL ALLERGIES: Primary | ICD-10-CM

## 2024-05-14 PROCEDURE — 99213 OFFICE O/P EST LOW 20 MIN: CPT | Performed by: NURSE PRACTITIONER

## 2024-05-14 PROCEDURE — 1160F RVW MEDS BY RX/DR IN RCRD: CPT | Performed by: NURSE PRACTITIONER

## 2024-05-14 PROCEDURE — 1159F MED LIST DOCD IN RCRD: CPT | Performed by: NURSE PRACTITIONER

## 2024-05-14 PROCEDURE — 1125F AMNT PAIN NOTED PAIN PRSNT: CPT | Performed by: NURSE PRACTITIONER

## 2024-05-14 RX ORDER — LORATADINE PSEUDOEPHEDRINE SULFATE 10; 240 MG/1; MG/1
1 TABLET, EXTENDED RELEASE ORAL DAILY
Qty: 15 TABLET | Refills: 5 | Status: SHIPPED | OUTPATIENT
Start: 2024-05-14

## 2024-05-14 RX ORDER — HYDROCODONE BITARTRATE AND ACETAMINOPHEN 7.5; 325 MG/1; MG/1
1 TABLET ORAL EVERY 6 HOURS PRN
Qty: 30 TABLET | Refills: 0 | Status: SHIPPED | OUTPATIENT
Start: 2024-05-14

## 2024-05-14 NOTE — PROGRESS NOTES
"Chief Complaint   Patient presents with    Back Pain     Low back, more left hip pain         Subjective   Denisa Michelle is a 63 y.o. female who presents today for stitches removal right hip and low back and left hip pain.     HPI   Skin well approximated with the remainder of 5 stitches to remove.   Patient c/o low back pain and left hip pain. She has been more active and walking her dog. The change in weather is causing her bursitis in her left hip to act up. She thinks she might have to go back to the orthopedist to get her hip injected.   She is having trouble with allergies and takes claritin D that helps her symptoms. She would like a refill on this.    Allergies   Allergen Reactions    Clindamycin/Lincomycin Hives and Rash         OBJECTIVE:  Vitals:    05/14/24 1250   BP: 117/72   BP Location: Left arm   Patient Position: Sitting   Cuff Size: Adult   Pulse: 82   Temp: 97.7 °F (36.5 °C)   SpO2: 95%   Weight: 68.9 kg (152 lb)   Height: 157.5 cm (62\")     Physical Exam  Musculoskeletal:      Lumbar back: Spasms and tenderness present. Decreased range of motion.      Left hip: Tenderness and crepitus present. Decreased range of motion.   Skin:     Comments: Right hip incision well approximated. % stitches removed with steri strip application. Tolerated well.                     ASSESSMENT/ PLAN:    Diagnoses and all orders for this visit:    1. Seasonal allergies (Primary)  -     loratadine-pseudoephedrine (Claritin-D 24 Hour)  MG per 24 hr tablet; Take 1 tablet by mouth Daily.  Dispense: 15 tablet; Refill: 5    2. Acute left-sided low back pain with left-sided sciatica  -     HYDROcodone-acetaminophen (NORCO) 7.5-325 MG per tablet; Take 1 tablet by mouth Every 6 (Six) Hours As Needed for Moderate Pain.  Dispense: 30 tablet; Refill: 0    3. Left hip pain  -     HYDROcodone-acetaminophen (NORCO) 7.5-325 MG per tablet; Take 1 tablet by mouth Every 6 (Six) Hours As Needed for Moderate Pain.  Dispense: 30 " tablet; Refill: 0      Procedures     Management Plan:   Will refer to orthopedics if necessary.   An After Visit Summary was printed and given to the patient at discharge.    Follow-up: Return if symptoms worsen or fail to improve.         Shelli Da Silva, NEW 5/14/2024 13:16 CDT  This note was electronically signed.

## 2024-05-23 ENCOUNTER — OFFICE VISIT (OUTPATIENT)
Dept: FAMILY MEDICINE CLINIC | Facility: CLINIC | Age: 64
End: 2024-05-23
Payer: MEDICARE

## 2024-05-23 VITALS
OXYGEN SATURATION: 98 % | RESPIRATION RATE: 18 BRPM | BODY MASS INDEX: 27.23 KG/M2 | WEIGHT: 148 LBS | SYSTOLIC BLOOD PRESSURE: 112 MMHG | TEMPERATURE: 97 F | HEIGHT: 62 IN | DIASTOLIC BLOOD PRESSURE: 72 MMHG | HEART RATE: 82 BPM

## 2024-05-23 DIAGNOSIS — K21.9 GERD WITHOUT ESOPHAGITIS: ICD-10-CM

## 2024-05-23 DIAGNOSIS — S71.011D: Primary | ICD-10-CM

## 2024-05-23 PROCEDURE — 1160F RVW MEDS BY RX/DR IN RCRD: CPT | Performed by: NURSE PRACTITIONER

## 2024-05-23 PROCEDURE — 1125F AMNT PAIN NOTED PAIN PRSNT: CPT | Performed by: NURSE PRACTITIONER

## 2024-05-23 PROCEDURE — 99213 OFFICE O/P EST LOW 20 MIN: CPT | Performed by: NURSE PRACTITIONER

## 2024-05-23 PROCEDURE — 1159F MED LIST DOCD IN RCRD: CPT | Performed by: NURSE PRACTITIONER

## 2024-05-23 RX ORDER — OMEPRAZOLE 20 MG/1
20 CAPSULE, DELAYED RELEASE ORAL 2 TIMES DAILY
Qty: 180 CAPSULE | Refills: 1 | Status: SHIPPED | OUTPATIENT
Start: 2024-05-23

## 2024-05-23 NOTE — PROGRESS NOTES
"Chief Complaint   Patient presents with    Follow-up     Procedure area om right hip         Subjective   Denisa Michelle is a 63 y.o. female who presents today for medication refill and recheck incision site.     HPI   She needs a refill on omeprazole for GERD.  She had a mass in her right hip that was excised on 4/29/2024. The stitches were taken out too early and I attempted to suture the site back together but the edges had already started healing. She presents today with incision site open with no s/s of infection. It is healing by secondary intention.     Allergies   Allergen Reactions    Clindamycin/Lincomycin Hives and Rash         OBJECTIVE:  Vitals:    05/23/24 1046   BP: 112/72   BP Location: Right arm   Patient Position: Sitting   Cuff Size: Adult   Pulse: 82   Resp: 18   Temp: 97 °F (36.1 °C)   TempSrc: Temporal   SpO2: 98%   Weight: 67.1 kg (148 lb)   Height: 157.5 cm (62\")     Physical Exam  Skin:     Comments: Left hip incision site open measuring approximately 4 cm in length. The center is open down to fat layer. It is healing by secondary intention.                     ASSESSMENT/ PLAN:    Diagnoses and all orders for this visit:    1. Laceration of right hip, subsequent encounter (Primary)    2. GERD without esophagitis  -     omeprazole (priLOSEC) 20 MG capsule; Take 1 capsule by mouth 2 (Two) Times a Day.  Dispense: 180 capsule; Refill: 1      Procedures     Management Plan:   Keep incision site covered while healing. She was instructed to watch for s/s of infection.   An After Visit Summary was printed and given to the patient at discharge.    Follow-up: Return if symptoms worsen or fail to improve.         Shelli Da Silva, NEW 5/23/2024 12:37 CDT  This note was electronically signed.          "

## 2024-05-28 ENCOUNTER — OFFICE VISIT (OUTPATIENT)
Dept: FAMILY MEDICINE CLINIC | Facility: CLINIC | Age: 64
End: 2024-05-28
Payer: MEDICARE

## 2024-05-28 VITALS
TEMPERATURE: 97 F | SYSTOLIC BLOOD PRESSURE: 108 MMHG | HEIGHT: 62 IN | DIASTOLIC BLOOD PRESSURE: 66 MMHG | OXYGEN SATURATION: 98 % | BODY MASS INDEX: 27.23 KG/M2 | HEART RATE: 73 BPM | RESPIRATION RATE: 18 BRPM | WEIGHT: 148 LBS

## 2024-05-28 DIAGNOSIS — M54.42 ACUTE LEFT-SIDED LOW BACK PAIN WITH LEFT-SIDED SCIATICA: ICD-10-CM

## 2024-05-28 DIAGNOSIS — M25.552 LEFT HIP PAIN: ICD-10-CM

## 2024-05-28 PROCEDURE — 1159F MED LIST DOCD IN RCRD: CPT | Performed by: NURSE PRACTITIONER

## 2024-05-28 PROCEDURE — 1125F AMNT PAIN NOTED PAIN PRSNT: CPT | Performed by: NURSE PRACTITIONER

## 2024-05-28 PROCEDURE — 99213 OFFICE O/P EST LOW 20 MIN: CPT | Performed by: NURSE PRACTITIONER

## 2024-05-28 PROCEDURE — 1160F RVW MEDS BY RX/DR IN RCRD: CPT | Performed by: NURSE PRACTITIONER

## 2024-05-28 RX ORDER — HYDROCODONE BITARTRATE AND ACETAMINOPHEN 7.5; 325 MG/1; MG/1
1 TABLET ORAL EVERY 4 HOURS PRN
Qty: 40 TABLET | Refills: 0 | Status: SHIPPED | OUTPATIENT
Start: 2024-05-28
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
## 2024-06-14 DIAGNOSIS — K59.00 CONSTIPATION, UNSPECIFIED CONSTIPATION TYPE: ICD-10-CM

## 2024-06-14 RX ORDER — BISACODYL 10 MG
SUPPOSITORY, RECTAL RECTAL
Qty: 10 SUPPOSITORY | Refills: 0 | Status: SHIPPED | OUTPATIENT
Start: 2024-06-14

## 2024-06-14 NOTE — TELEPHONE ENCOUNTER
Rx Refill Note  Requested Prescriptions     Pending Prescriptions Disp Refills    Dulcolax 10 MG suppository [Pharmacy Med Name: DULCOLAX 10MG SUPP] 10 suppository 0     Sig: UNWRAP AND INSERT 1 SUPPOSITORY RECTALLY ONCE DAILY AS DIRECTED      Last office visit with prescribing clinician: 5/28/2024   Last telemedicine visit with prescribing clinician: Visit date not found   Next office visit with prescribing clinician: 6/17/2024       Matilde Bueno MA  06/14/24, 08:14 CDT

## 2024-06-17 ENCOUNTER — OFFICE VISIT (OUTPATIENT)
Dept: FAMILY MEDICINE CLINIC | Facility: CLINIC | Age: 64
End: 2024-06-17
Payer: MEDICARE

## 2024-06-17 VITALS
BODY MASS INDEX: 27.23 KG/M2 | OXYGEN SATURATION: 98 % | SYSTOLIC BLOOD PRESSURE: 93 MMHG | TEMPERATURE: 98 F | WEIGHT: 148 LBS | HEART RATE: 89 BPM | RESPIRATION RATE: 18 BRPM | HEIGHT: 62 IN | DIASTOLIC BLOOD PRESSURE: 63 MMHG

## 2024-06-17 DIAGNOSIS — M54.2 CERVICAL PAIN: ICD-10-CM

## 2024-06-17 DIAGNOSIS — M54.6 ACUTE BILATERAL THORACIC BACK PAIN: Primary | ICD-10-CM

## 2024-06-17 DIAGNOSIS — M25.50 MULTIPLE JOINT PAIN: ICD-10-CM

## 2024-06-17 PROCEDURE — 1160F RVW MEDS BY RX/DR IN RCRD: CPT | Performed by: NURSE PRACTITIONER

## 2024-06-17 PROCEDURE — 1125F AMNT PAIN NOTED PAIN PRSNT: CPT | Performed by: NURSE PRACTITIONER

## 2024-06-17 PROCEDURE — 1159F MED LIST DOCD IN RCRD: CPT | Performed by: NURSE PRACTITIONER

## 2024-06-17 PROCEDURE — 99213 OFFICE O/P EST LOW 20 MIN: CPT | Performed by: NURSE PRACTITIONER

## 2024-06-17 RX ORDER — HYDROCODONE BITARTRATE AND ACETAMINOPHEN 7.5; 325 MG/1; MG/1
1 TABLET ORAL EVERY 4 HOURS PRN
Qty: 40 TABLET | Refills: 0 | Status: SHIPPED | OUTPATIENT
Start: 2024-06-17

## 2024-06-17 RX ORDER — PREGABALIN 150 MG/1
150 CAPSULE ORAL EVERY EVENING
Qty: 90 CAPSULE | Refills: 2 | Status: SHIPPED | OUTPATIENT
Start: 2024-06-17

## 2024-06-17 NOTE — PROGRESS NOTES
"Chief Complaint   Patient presents with    Back Pain     Fell         Subjective   Denisa Michelle is a 63 y.o. female who presents today for neck and back pain.    HPI   A week ago she let her dog out in the morning and she slid on her steps while holding on to the door and twisted her mid to upper back. She has been in pain since. She has taken her norco and is requesting a refill.   She needs a refill on her lyrica for fibromyalgia.    Allergies   Allergen Reactions    Clindamycin/Lincomycin Hives and Rash         OBJECTIVE:  Vitals:    06/17/24 1336   BP: 93/63   BP Location: Left arm   Patient Position: Sitting   Cuff Size: Adult   Pulse: 89   Resp: 18   Temp: 98 °F (36.7 °C)   TempSrc: Temporal   SpO2: 98%   Weight: 67.1 kg (148 lb)   Height: 157.5 cm (62\")     Physical Exam  Musculoskeletal:      Cervical back: Tenderness present. Decreased range of motion.      Thoracic back: Spasms and tenderness present. Decreased range of motion.                    ASSESSMENT/ PLAN:    Diagnoses and all orders for this visit:    1. Acute bilateral thoracic back pain (Primary)  -     HYDROcodone-acetaminophen (NORCO) 7.5-325 MG per tablet; Take 1 tablet by mouth Every 4 (Four) Hours As Needed for Moderate Pain.  Dispense: 40 tablet; Refill: 0    2. Multiple joint pain  -     pregabalin (LYRICA) 150 MG capsule; Take 1 capsule by mouth Every Evening.  Dispense: 90 capsule; Refill: 2    3. Cervical pain  -     HYDROcodone-acetaminophen (NORCO) 7.5-325 MG per tablet; Take 1 tablet by mouth Every 4 (Four) Hours As Needed for Moderate Pain.  Dispense: 40 tablet; Refill: 0      Procedures     Management Plan:   Take all medications as ordered.   An After Visit Summary was printed and given to the patient at discharge.    Follow-up: Return if symptoms worsen or fail to improve.         NEW Trivedi 6/17/2024 14:04 CDT  This note was electronically signed.          "
98

## 2024-07-02 ENCOUNTER — OFFICE VISIT (OUTPATIENT)
Dept: FAMILY MEDICINE CLINIC | Facility: CLINIC | Age: 64
End: 2024-07-02
Payer: MEDICARE

## 2024-07-02 VITALS
HEART RATE: 77 BPM | OXYGEN SATURATION: 95 % | TEMPERATURE: 98 F | SYSTOLIC BLOOD PRESSURE: 122 MMHG | BODY MASS INDEX: 27.23 KG/M2 | HEIGHT: 62 IN | DIASTOLIC BLOOD PRESSURE: 74 MMHG | WEIGHT: 148 LBS

## 2024-07-02 DIAGNOSIS — M54.41 ACUTE RIGHT-SIDED LOW BACK PAIN WITH RIGHT-SIDED SCIATICA: Primary | ICD-10-CM

## 2024-07-02 DIAGNOSIS — J30.2 SEASONAL ALLERGIES: ICD-10-CM

## 2024-07-02 PROCEDURE — 1160F RVW MEDS BY RX/DR IN RCRD: CPT | Performed by: NURSE PRACTITIONER

## 2024-07-02 PROCEDURE — 1125F AMNT PAIN NOTED PAIN PRSNT: CPT | Performed by: NURSE PRACTITIONER

## 2024-07-02 PROCEDURE — 99213 OFFICE O/P EST LOW 20 MIN: CPT | Performed by: NURSE PRACTITIONER

## 2024-07-02 PROCEDURE — 1159F MED LIST DOCD IN RCRD: CPT | Performed by: NURSE PRACTITIONER

## 2024-07-02 RX ORDER — AZELASTINE 1 MG/ML
2 SPRAY, METERED NASAL 2 TIMES DAILY
Qty: 30 ML | Refills: 12 | Status: SHIPPED | OUTPATIENT
Start: 2024-07-02

## 2024-07-02 RX ORDER — HYDROCODONE BITARTRATE AND ACETAMINOPHEN 7.5; 325 MG/1; MG/1
1 TABLET ORAL EVERY 4 HOURS PRN
Qty: 40 TABLET | Refills: 0 | Status: SHIPPED | OUTPATIENT
Start: 2024-07-02

## 2024-07-02 RX ORDER — LORATADINE/PSEUDOEPHEDRINE 10MG-240MG
1 TABLET, EXTENDED RELEASE 24 HR ORAL DAILY
Qty: 15 TABLET | Refills: 3 | Status: SHIPPED | OUTPATIENT
Start: 2024-07-02

## 2024-07-02 NOTE — PROGRESS NOTES
"Chief Complaint   Patient presents with    Back Pain     Right side, low back           Subjective   Denisa Michelle is a 63 y.o. female who presents today for right low back pain and bilateral plantar surface pain.    HPI   Since her last fall, she has been suffering with right low back pain and bilateral planar surface pain. When she walks her dog, she has to hold the right side of her back. Her feet have been painful x several weeks. She does not have pes planus and is not diabetic.   She is needing a refill on her claritin D.    Allergies   Allergen Reactions    Clindamycin/Lincomycin Hives and Rash         OBJECTIVE:  Vitals:    07/02/24 1254   BP: 122/74   BP Location: Left arm   Patient Position: Sitting   Cuff Size: Adult   Pulse: 77   Temp: 98 °F (36.7 °C)   SpO2: 95%   Weight: 67.1 kg (148 lb)   Height: 157.5 cm (62\")     Physical Exam  Vitals and nursing note reviewed.   Constitutional:       Appearance: Normal appearance.   Cardiovascular:      Rate and Rhythm: Normal rate and regular rhythm.      Pulses: Normal pulses.      Heart sounds: Normal heart sounds.   Pulmonary:      Effort: Pulmonary effort is normal.      Breath sounds: Normal breath sounds.   Musculoskeletal:      Lumbar back: Tenderness present. Decreased range of motion.      Comments: Tenderness above iliac crest.    Feet:      Right foot:      Protective Sensation: 5 sites tested.  5 sites sensed.      Skin integrity: Skin integrity normal.      Left foot:      Protective Sensation: 5 sites tested.  5 sites sensed.      Skin integrity: Skin integrity normal.      Comments: Tenderness to all plantar surfaces  Skin:     General: Skin is warm and dry.   Neurological:      General: No focal deficit present.      Mental Status: She is alert and oriented to person, place, and time.   Psychiatric:         Mood and Affect: Mood normal.         Behavior: Behavior normal.         Thought Content: Thought content normal.         Judgment: Judgment " normal.                    ASSESSMENT/ PLAN:    Diagnoses and all orders for this visit:    1. Acute right-sided low back pain with right-sided sciatica (Primary)  -     HYDROcodone-acetaminophen (NORCO) 7.5-325 MG per tablet; Take 1 tablet by mouth Every 4 (Four) Hours As Needed for Moderate Pain.  Dispense: 40 tablet; Refill: 0    2. Seasonal allergies  -     loratadine-pseudoephedrine (Loratadine-D 24HR)  MG per 24 hr tablet; Take 1 tablet by mouth Daily.  Dispense: 15 tablet; Refill: 3  -     azelastine (ASTELIN) 0.1 % nasal spray; 2 sprays into the nostril(s) as directed by provider 2 (Two) Times a Day. Use in each nostril as directed  Dispense: 30 mL; Refill: 12      Procedures     Management Plan:   Continue present management.  An After Visit Summary was printed and given to the patient at discharge.    Follow-up: Return if symptoms worsen or fail to improve.         Shelli Da Silva, NEW 7/2/2024 15:26 CDT  This note was electronically signed.

## 2024-07-22 ENCOUNTER — OFFICE VISIT (OUTPATIENT)
Dept: FAMILY MEDICINE CLINIC | Facility: CLINIC | Age: 64
End: 2024-07-22
Payer: MEDICARE

## 2024-07-22 VITALS
OXYGEN SATURATION: 97 % | DIASTOLIC BLOOD PRESSURE: 62 MMHG | BODY MASS INDEX: 26.31 KG/M2 | WEIGHT: 143 LBS | HEIGHT: 62 IN | TEMPERATURE: 97.9 F | SYSTOLIC BLOOD PRESSURE: 94 MMHG | HEART RATE: 76 BPM

## 2024-07-22 DIAGNOSIS — M54.41 ACUTE RIGHT-SIDED LOW BACK PAIN WITH RIGHT-SIDED SCIATICA: Primary | ICD-10-CM

## 2024-07-22 DIAGNOSIS — K59.04 CHRONIC IDIOPATHIC CONSTIPATION: ICD-10-CM

## 2024-07-22 DIAGNOSIS — L29.9 PRURITUS: ICD-10-CM

## 2024-07-22 PROCEDURE — 1125F AMNT PAIN NOTED PAIN PRSNT: CPT | Performed by: NURSE PRACTITIONER

## 2024-07-22 PROCEDURE — 99213 OFFICE O/P EST LOW 20 MIN: CPT | Performed by: NURSE PRACTITIONER

## 2024-07-22 PROCEDURE — 1160F RVW MEDS BY RX/DR IN RCRD: CPT | Performed by: NURSE PRACTITIONER

## 2024-07-22 PROCEDURE — 1159F MED LIST DOCD IN RCRD: CPT | Performed by: NURSE PRACTITIONER

## 2024-07-22 RX ORDER — BISACODYL 10 MG
10 SUPPOSITORY, RECTAL RECTAL DAILY
Qty: 10 SUPPOSITORY | Refills: 5 | Status: SHIPPED | OUTPATIENT
Start: 2024-07-22

## 2024-07-22 RX ORDER — PREGABALIN 150 MG/1
1 CAPSULE ORAL EVERY 24 HOURS
COMMUNITY

## 2024-07-22 RX ORDER — HYDROCODONE BITARTRATE AND ACETAMINOPHEN 10; 325 MG/1; MG/1
1 TABLET ORAL EVERY 6 HOURS PRN
Qty: 30 TABLET | Refills: 0 | Status: SHIPPED | OUTPATIENT
Start: 2024-07-22

## 2024-07-22 RX ORDER — TRIAMCINOLONE ACETONIDE 1 MG/G
1 CREAM TOPICAL 2 TIMES DAILY
Qty: 454 G | Refills: 3 | Status: SHIPPED | OUTPATIENT
Start: 2024-07-22

## 2024-07-22 NOTE — PROGRESS NOTES
Chief Complaint   Patient presents with    Back Pain     Back and hip pain has been acting up and pt is out of her pain medicine. She said her feet are also hurting. Pt states that pain has been very bad the last couple of weels.         Subjective   Denisa Michelle is a 64 y.o. female who presents today for the following:  Low back pain and constipation.    Constipation  This is a recurrent problem. The current episode started more than 1 month ago. The problem has been gradually worsening since onset. Her stool frequency is 2 to 3 times per week. The stool is described as firm and formed. The patient is not on a high fiber diet. She Does not exercise regularly. There has Not been adequate water intake. Associated symptoms include back pain, bloating and hemorrhoids. Risk factors include immobility. She has tried nothing for the symptoms. Her past medical history is significant for neurologic disease and psychiatric history.   Back Pain  This is a recurrent problem. The current episode started more than 1 month ago. The problem occurs constantly. The problem has been worse since onset. The pain is present in the lumbar spine and gluteal. The quality of the pain is described as aching and stabbing. The pain radiates to the right thigh, right knee, right foot, right anterolateral lower leg and right buttock. The pain is at a severity of 7/10. The pain is moderate. The pain is The same all the time. The symptoms are aggravated by bending, twisting, standing and position. Stiffness is present In the morning. Risk factors include history of cancer, lack of exercise, poor posture and sedentary lifestyle. She has tried nothing for the symptoms.      She has been experiencing low back pain on the right from her waist all the way down to her foot. She has run out of pain medication. Severity of pain greater over the past 2 weeks.     She is requesting a refill on her triamcinolone.     Allergies   Allergen Reactions     "Clindamycin/Lincomycin Hives and Rash         OBJECTIVE:  Vitals:    07/22/24 1013   BP: 94/62   Pulse: 76   Temp: 97.9 °F (36.6 °C)   TempSrc: Infrared   SpO2: 97%   Weight: 64.9 kg (143 lb)   Height: 157.5 cm (62.01\")     Physical Exam  Abdominal:      General: Abdomen is flat. Bowel sounds are decreased.      Palpations: Abdomen is soft.   Musculoskeletal:      Lumbar back: Tenderness present. Decreased range of motion.      Comments: Tenderness to palpation right lower back around to hip and down to foot.                     ASSESSMENT/ PLAN:    Diagnoses and all orders for this visit:    1. Acute right-sided low back pain with right-sided sciatica (Primary)  -     HYDROcodone-acetaminophen (Norco)  MG per tablet; Take 1 tablet by mouth Every 6 (Six) Hours As Needed for Moderate Pain.  Dispense: 30 tablet; Refill: 0    2. Chronic idiopathic constipation  -     bisacodyl (DULCOLAX) 10 MG suppository; Insert 1 suppository into the rectum Daily.  Dispense: 10 suppository; Refill: 5    3. Pruritus  -     triamcinolone (KENALOG) 0.1 % cream; Apply 1 Application topically to the appropriate area as directed 2 (Two) Times a Day.  Dispense: 454 g; Refill: 3      Procedures     Management Plan:   Take pain medications sparingly.  An After Visit Summary was printed and given to the patient at discharge.    Follow-up: Return if symptoms worsen or fail to improve.         NEW Trivedi 7/22/2024 12:02 CDT  This note was electronically signed.          "

## 2024-08-01 ENCOUNTER — OFFICE VISIT (OUTPATIENT)
Dept: FAMILY MEDICINE CLINIC | Facility: CLINIC | Age: 64
End: 2024-08-01
Payer: MEDICARE

## 2024-08-01 VITALS
SYSTOLIC BLOOD PRESSURE: 90 MMHG | HEIGHT: 62 IN | HEART RATE: 68 BPM | DIASTOLIC BLOOD PRESSURE: 60 MMHG | OXYGEN SATURATION: 93 % | RESPIRATION RATE: 20 BRPM | TEMPERATURE: 98.2 F | WEIGHT: 147 LBS | BODY MASS INDEX: 27.05 KG/M2

## 2024-08-01 DIAGNOSIS — M54.41 ACUTE RIGHT-SIDED LOW BACK PAIN WITH RIGHT-SIDED SCIATICA: ICD-10-CM

## 2024-08-01 PROCEDURE — 99213 OFFICE O/P EST LOW 20 MIN: CPT | Performed by: NURSE PRACTITIONER

## 2024-08-01 PROCEDURE — 1125F AMNT PAIN NOTED PAIN PRSNT: CPT | Performed by: NURSE PRACTITIONER

## 2024-08-01 RX ORDER — HYDROCODONE BITARTRATE AND ACETAMINOPHEN 10; 325 MG/1; MG/1
1 TABLET ORAL EVERY 6 HOURS PRN
Qty: 60 TABLET | Refills: 0 | Status: SHIPPED | OUTPATIENT
Start: 2024-08-01

## 2024-08-01 NOTE — PROGRESS NOTES
"Chief Complaint   Patient presents with    Back Pain    Muscle Pain        Subjective   Denisa Michelle is a 64 y.o. female who presents today for the following: low back pain and hip pain.    HPI   She saw Dr. Irwin this week who is going to send her to a neurosurgeon for her back. He stated her back was in \"bad shape.\" It is her lumbar spine that is causing bilateral hip pain, especially on the right. She does not have an appointment yet.     Allergies   Allergen Reactions    Clindamycin/Lincomycin Hives and Rash         OBJECTIVE:  Vitals:    08/01/24 0943   BP: 90/60   Pulse: 68   Resp: 20   Temp: 98.2 °F (36.8 °C)   TempSrc: Skin   SpO2: 93%   Weight: 66.7 kg (147 lb)   Height: 157.5 cm (62.01\")     Physical Exam  Vitals and nursing note reviewed.   Constitutional:       Appearance: Normal appearance.   Cardiovascular:      Rate and Rhythm: Normal rate and regular rhythm.      Pulses: Normal pulses.      Heart sounds: Normal heart sounds.   Pulmonary:      Effort: Pulmonary effort is normal.      Breath sounds: Normal breath sounds.   Musculoskeletal:      Lumbar back: Spasms and tenderness present. Decreased range of motion.      Right hip: Tenderness and crepitus present. Decreased range of motion.      Left hip: Tenderness and crepitus present. Decreased range of motion.   Skin:     General: Skin is warm and dry.   Neurological:      General: No focal deficit present.      Mental Status: She is alert and oriented to person, place, and time.   Psychiatric:         Mood and Affect: Mood normal.         Behavior: Behavior normal.         Thought Content: Thought content normal.         Judgment: Judgment normal.                    ASSESSMENT/ PLAN:    Diagnoses and all orders for this visit:    1. Acute right-sided low back pain with right-sided sciatica  -     HYDROcodone-acetaminophen (Norco)  MG per tablet; Take 1 tablet by mouth Every 6 (Six) Hours As Needed for Moderate Pain.  Dispense: 60 tablet; " Refill: 0      Procedures     Management Plan:   Keep appointment with neurosurgeon.   An After Visit Summary was printed and given to the patient at discharge.    Follow-up: Return if symptoms worsen or fail to improve.         Shelli Da Silva, APRN 8/1/2024 10:13 CDT  This note was electronically signed.

## 2024-08-13 ENCOUNTER — TELEPHONE (OUTPATIENT)
Dept: NEUROSURGERY | Age: 64
End: 2024-08-13

## 2024-08-13 NOTE — TELEPHONE ENCOUNTER
[facility] New Patient Questionnaire    Diagnosis/Reason for Referral?     DX:   M25.552 (ICD-10-CM) - Pain in left hip   M51.36 (ICD-10-CM) - Other intervertebral disc degeneration, lumbar region       2. Who is completing questionnaire?      Patient X Caregiver Family      3. Has the patient had any previous spinal/brain surgeries?     NO      A. If yes, what is the name of the facility in which the surgery was performed?       B. Procedure/Surgery performed?       C. Who was the surgeon?       D. When was the surgery?    MM/YY       E. Did the patient improve after the surgery?        4. Is this a second opinion?   If yes, Dr. Gibson would like to review patient first before making the appointment.      5. Have MRI Images been obtain within the last year?     Yes X  No      XR  CT     If yes, where was the imaging performed?     [facility]    If yes, what part of the body?     Lumbar X Cervical  Thoracic  Brain     If yes, when was it obtained?      2/28/2024    Note: if the scan was performed at a facility other than [facility], the disc will need to be brought to the appointment or we need to reach out to obtain the disc.     A. Was the patient instructed to provide the disc?      Yes   No X      8. Has the patient had a NCV/EMG within the last year?      Yes   No X     If yes, where was it performed and date?      MM/YY  Location:       9. Has the patient been to Physical Therapy?      Yes X  No     If yes, what location, how long attended, and last visit?    Location: [facility]       Therapy Lasted: \"2-3 WEEKS\"   Date of Last Visit:      10. Has the patient been to Pain Management?     Yes   No X     If yes, what location and last visit     Location:   Last Visit:   Is it helping?

## 2024-08-23 ENCOUNTER — OFFICE VISIT (OUTPATIENT)
Dept: FAMILY MEDICINE CLINIC | Facility: CLINIC | Age: 64
End: 2024-08-23
Payer: MEDICARE

## 2024-08-23 VITALS
DIASTOLIC BLOOD PRESSURE: 60 MMHG | BODY MASS INDEX: 25.87 KG/M2 | HEIGHT: 63 IN | SYSTOLIC BLOOD PRESSURE: 86 MMHG | TEMPERATURE: 97.3 F | HEART RATE: 78 BPM | OXYGEN SATURATION: 93 % | WEIGHT: 146 LBS

## 2024-08-23 DIAGNOSIS — R53.83 FATIGUE, UNSPECIFIED TYPE: ICD-10-CM

## 2024-08-23 DIAGNOSIS — I95.0 IDIOPATHIC HYPOTENSION: ICD-10-CM

## 2024-08-23 DIAGNOSIS — Z79.899 LONG-TERM USE OF HIGH-RISK MEDICATION: Primary | ICD-10-CM

## 2024-08-23 DIAGNOSIS — M54.41 CHRONIC RIGHT-SIDED LOW BACK PAIN WITH RIGHT-SIDED SCIATICA: ICD-10-CM

## 2024-08-23 DIAGNOSIS — G89.29 CHRONIC RIGHT-SIDED LOW BACK PAIN WITH RIGHT-SIDED SCIATICA: ICD-10-CM

## 2024-08-23 RX ORDER — HYDROCODONE BITARTRATE AND ACETAMINOPHEN 5; 325 MG/1; MG/1
1 TABLET ORAL EVERY 6 HOURS PRN
Qty: 120 TABLET | Refills: 0 | Status: SHIPPED | OUTPATIENT
Start: 2024-08-23

## 2024-08-23 NOTE — PROGRESS NOTES
"Chief Complaint  Back Pain (Low back, patient has an appt next month with Ortho for injections )    Subjective        Denisa Michelle presents to [facility]  History of Present Illness  BACK PAIN: patient is hopeful that appt with NS next month will afford her better pain control. She is interested in a bit of a change from her hydrocodone 10 mg. She notes that it is too sedating and then wears off. She voices interest in a lower dose with more frequent dosing, if appropriate. She has never shown any signs of abuse or misuse. UDS and controlled substance agreement are up to date. DES is reviewed.  LOW B/P: Patient does not routinely check her b/p at home. She does note an increase in light headedness and dizziness as well as fatigue. Does not appear to be related to opioid as it is low this morning and her last dose was early last evening.    Objective   Vital Signs:  BP (!) 86/60 (BP Location: Left arm, Patient Position: Sitting, Cuff Size: Adult)   Pulse 78   Temp 97.3 °F (36.3 °C)   Ht 158.8 cm (62.5\")   Wt 66.2 kg (146 lb)   SpO2 93%   BMI 26.28 kg/m²   Estimated body mass index is 26.28 kg/m² as calculated from the following:    Height as of this encounter: 158.8 cm (62.5\").    Weight as of this encounter: 66.2 kg (146 lb).             Physical Exam  Vitals and nursing note reviewed.   Constitutional:       General: She is not in acute distress.     Appearance: Normal appearance. She is not ill-appearing.   HENT:      Head: Normocephalic and atraumatic.   Cardiovascular:      Rate and Rhythm: Normal rate and regular rhythm.      Heart sounds: Normal heart sounds. No murmur heard.  Pulmonary:      Effort: Pulmonary effort is normal.      Breath sounds: Normal breath sounds.   Musculoskeletal:      Comments: Thin, muscle wasting of all extremities apparent.   Skin:     General: Skin is warm and dry.      Comments: Multiple areas of purpura on all extremities. "   Neurological:      Mental Status: She is alert and oriented to person, place, and time.        Result Review :                Assessment and Plan   Diagnoses and all orders for this visit:    1. Long-term use of high-risk medication (Primary)    2. Chronic right-sided low back pain with right-sided sciatica  -     HYDROcodone-acetaminophen (NORCO) 5-325 MG per tablet; Take 1 tablet by mouth Every 6 (Six) Hours As Needed for Moderate Pain.  Dispense: 120 tablet; Refill: 0    3. Idiopathic hypotension  -     Comprehensive metabolic panel  -     CBC w AUTO Differential    4. Fatigue, unspecified type  -     Comprehensive metabolic panel  -     CBC w AUTO Differential    Further recommendations will be based on lab results.         Follow Up   Return in about 3 months (around 12/2/2024) for medicare wellness with labs prior (with Shelli).  Patient was given instructions and counseling regarding her condition or for health maintenance advice. Please see specific information pulled into the AVS if appropriate.     NEW Mcgrath  This note is electronically signed.

## 2024-08-24 LAB
ALBUMIN SERPL-MCNC: 3.9 G/DL (ref 3.9–4.9)
ALP SERPL-CCNC: 104 IU/L (ref 44–121)
ALT SERPL-CCNC: 25 IU/L (ref 0–32)
AST SERPL-CCNC: 19 IU/L (ref 0–40)
BASOPHILS # BLD AUTO: 0 X10E3/UL (ref 0–0.2)
BASOPHILS NFR BLD AUTO: 1 %
BILIRUB SERPL-MCNC: 1 MG/DL (ref 0–1.2)
BUN SERPL-MCNC: 10 MG/DL (ref 8–27)
BUN/CREAT SERPL: 9 (ref 12–28)
CALCIUM SERPL-MCNC: 9.5 MG/DL (ref 8.7–10.3)
CHLORIDE SERPL-SCNC: 103 MMOL/L (ref 96–106)
CO2 SERPL-SCNC: 26 MMOL/L (ref 20–29)
CREAT SERPL-MCNC: 1.1 MG/DL (ref 0.57–1)
EGFRCR SERPLBLD CKD-EPI 2021: 55 ML/MIN/1.73
EOSINOPHIL # BLD AUTO: 0.2 X10E3/UL (ref 0–0.4)
EOSINOPHIL NFR BLD AUTO: 2 %
ERYTHROCYTE [DISTWIDTH] IN BLOOD BY AUTOMATED COUNT: 12.4 % (ref 11.7–15.4)
GLOBULIN SER CALC-MCNC: 1.6 G/DL (ref 1.5–4.5)
GLUCOSE SERPL-MCNC: 88 MG/DL (ref 70–99)
HCT VFR BLD AUTO: 47.8 % (ref 34–46.6)
HGB BLD-MCNC: 15.1 G/DL (ref 11.1–15.9)
IMM GRANULOCYTES # BLD AUTO: 0 X10E3/UL (ref 0–0.1)
IMM GRANULOCYTES NFR BLD AUTO: 0 %
LYMPHOCYTES # BLD AUTO: 2 X10E3/UL (ref 0.7–3.1)
LYMPHOCYTES NFR BLD AUTO: 31 %
MCH RBC QN AUTO: 30.3 PG (ref 26.6–33)
MCHC RBC AUTO-ENTMCNC: 31.6 G/DL (ref 31.5–35.7)
MCV RBC AUTO: 96 FL (ref 79–97)
MONOCYTES # BLD AUTO: 0.5 X10E3/UL (ref 0.1–0.9)
MONOCYTES NFR BLD AUTO: 8 %
NEUTROPHILS # BLD AUTO: 3.8 X10E3/UL (ref 1.4–7)
NEUTROPHILS NFR BLD AUTO: 58 %
PLATELET # BLD AUTO: 239 X10E3/UL (ref 150–450)
POTASSIUM SERPL-SCNC: 4.2 MMOL/L (ref 3.5–5.2)
PROT SERPL-MCNC: 5.5 G/DL (ref 6–8.5)
RBC # BLD AUTO: 4.98 X10E6/UL (ref 3.77–5.28)
SODIUM SERPL-SCNC: 142 MMOL/L (ref 134–144)
WBC # BLD AUTO: 6.6 X10E3/UL (ref 3.4–10.8)

## 2024-08-29 DIAGNOSIS — E03.9 ACQUIRED HYPOTHYROIDISM: Primary | ICD-10-CM

## 2024-08-29 RX ORDER — LEVOTHYROXINE SODIUM 50 UG/1
50 TABLET ORAL DAILY
Qty: 90 TABLET | Refills: 1 | Status: SHIPPED | OUTPATIENT
Start: 2024-08-29

## 2024-08-29 NOTE — TELEPHONE ENCOUNTER
Rx Refill Note  Requested Prescriptions     Pending Prescriptions Disp Refills    levothyroxine (SYNTHROID, LEVOTHROID) 50 MCG tablet [Pharmacy Med Name: LEVOTHYROXINE SODIUM 50MCG TABS] 90 tablet 3     Sig: TAKE ONE TABLET BY MOUTH EVERY DAY        Carmelina Conway MA  08/29/24, 07:38 CDT

## 2024-09-12 ENCOUNTER — OFFICE VISIT (OUTPATIENT)
Dept: NEUROSURGERY | Age: 64
End: 2024-09-12
Payer: MEDICARE

## 2024-09-12 ENCOUNTER — HOSPITAL ENCOUNTER (OUTPATIENT)
Dept: GENERAL RADIOLOGY | Age: 64
Discharge: HOME | End: 2024-09-12
Payer: MEDICARE

## 2024-09-12 ENCOUNTER — TELEPHONE (OUTPATIENT)
Dept: FAMILY MEDICINE CLINIC | Facility: CLINIC | Age: 64
End: 2024-09-12
Payer: MEDICARE

## 2024-09-12 VITALS
HEIGHT: 63 IN | HEART RATE: 69 BPM | WEIGHT: 147 LBS | RESPIRATION RATE: 18 BRPM | SYSTOLIC BLOOD PRESSURE: 93 MMHG | BODY MASS INDEX: 26.05 KG/M2 | DIASTOLIC BLOOD PRESSURE: 57 MMHG

## 2024-09-12 DIAGNOSIS — S32.010A CLOSED COMPRESSION FRACTURE OF BODY OF L1 VERTEBRA (HCC): ICD-10-CM

## 2024-09-12 DIAGNOSIS — M48.061 LUMBAR FORAMINAL STENOSIS: ICD-10-CM

## 2024-09-12 DIAGNOSIS — M54.50 CHRONIC MIDLINE LOW BACK PAIN WITHOUT SCIATICA: ICD-10-CM

## 2024-09-12 DIAGNOSIS — G89.29 CHRONIC MIDLINE LOW BACK PAIN WITHOUT SCIATICA: ICD-10-CM

## 2024-09-12 DIAGNOSIS — S32.010A CLOSED COMPRESSION FRACTURE OF BODY OF L1 VERTEBRA (HCC): Primary | ICD-10-CM

## 2024-09-12 DIAGNOSIS — M51.36 DDD (DEGENERATIVE DISC DISEASE), LUMBAR: ICD-10-CM

## 2024-09-12 PROCEDURE — G8400 PT W/DXA NO RESULTS DOC: HCPCS | Performed by: NURSE PRACTITIONER

## 2024-09-12 PROCEDURE — G8419 CALC BMI OUT NRM PARAM NOF/U: HCPCS | Performed by: NURSE PRACTITIONER

## 2024-09-12 PROCEDURE — 72110 X-RAY EXAM L-2 SPINE 4/>VWS: CPT

## 2024-09-12 PROCEDURE — 4004F PT TOBACCO SCREEN RCVD TLK: CPT | Performed by: NURSE PRACTITIONER

## 2024-09-12 PROCEDURE — G8427 DOCREV CUR MEDS BY ELIG CLIN: HCPCS | Performed by: NURSE PRACTITIONER

## 2024-09-12 PROCEDURE — 99204 OFFICE O/P NEW MOD 45 MIN: CPT | Performed by: NURSE PRACTITIONER

## 2024-09-12 PROCEDURE — 3017F COLORECTAL CA SCREEN DOC REV: CPT | Performed by: NURSE PRACTITIONER

## 2024-09-12 PROCEDURE — 1090F PRES/ABSN URINE INCON ASSESS: CPT | Performed by: NURSE PRACTITIONER

## 2024-09-12 PROCEDURE — 1123F ACP DISCUSS/DSCN MKR DOCD: CPT | Performed by: NURSE PRACTITIONER

## 2024-09-12 RX ORDER — TRAZODONE HYDROCHLORIDE 50 MG/1
100 TABLET, FILM COATED ORAL NIGHTLY
COMMUNITY

## 2024-09-12 RX ORDER — HYDROCODONE BITARTRATE AND ACETAMINOPHEN 5; 325 MG/1; MG/1
1 TABLET ORAL EVERY 6 HOURS PRN
COMMUNITY
Start: 2024-08-23

## 2024-09-12 ASSESSMENT — ENCOUNTER SYMPTOMS
EYES NEGATIVE: 1
GASTROINTESTINAL NEGATIVE: 1
RESPIRATORY NEGATIVE: 1
BACK PAIN: 1

## 2024-09-12 NOTE — TELEPHONE ENCOUNTER
Pt called to see if a provider was in because she has a toothache and wanted to see about getting medication. I informed her that no one was here the rest  of the day and pt stated that she would reach out tomorrow to ask the same thing.

## 2024-09-13 ENCOUNTER — OFFICE VISIT (OUTPATIENT)
Dept: FAMILY MEDICINE CLINIC | Facility: CLINIC | Age: 64
End: 2024-09-13
Payer: MEDICARE

## 2024-09-13 VITALS
OXYGEN SATURATION: 98 % | RESPIRATION RATE: 18 BRPM | HEART RATE: 98 BPM | SYSTOLIC BLOOD PRESSURE: 98 MMHG | WEIGHT: 148.2 LBS | HEIGHT: 63 IN | DIASTOLIC BLOOD PRESSURE: 58 MMHG | BODY MASS INDEX: 26.26 KG/M2 | TEMPERATURE: 98.1 F

## 2024-09-13 DIAGNOSIS — R30.0 DYSURIA: Primary | ICD-10-CM

## 2024-09-13 DIAGNOSIS — K04.7 DENTAL ABSCESS: Primary | ICD-10-CM

## 2024-09-13 DIAGNOSIS — R39.11 URINARY HESITANCY: ICD-10-CM

## 2024-09-13 LAB
BILIRUB BLD-MCNC: NEGATIVE MG/DL
CLARITY, POC: CLEAR
COLOR UR: YELLOW
EXPIRATION DATE: NORMAL
GLUCOSE UR STRIP-MCNC: NEGATIVE MG/DL
KETONES UR QL: NEGATIVE
LEUKOCYTE EST, POC: NEGATIVE
Lab: NORMAL
NITRITE UR-MCNC: NEGATIVE MG/ML
PH UR: 7 [PH] (ref 5–8)
PROT UR STRIP-MCNC: NEGATIVE MG/DL
RBC # UR STRIP: NEGATIVE /UL
SP GR UR: 1.01 (ref 1–1.03)
UROBILINOGEN UR QL: NORMAL

## 2024-09-13 PROCEDURE — 99213 OFFICE O/P EST LOW 20 MIN: CPT | Performed by: NURSE PRACTITIONER

## 2024-09-13 PROCEDURE — 81003 URINALYSIS AUTO W/O SCOPE: CPT | Performed by: NURSE PRACTITIONER

## 2024-09-13 PROCEDURE — 1159F MED LIST DOCD IN RCRD: CPT | Performed by: NURSE PRACTITIONER

## 2024-09-13 PROCEDURE — 1160F RVW MEDS BY RX/DR IN RCRD: CPT | Performed by: NURSE PRACTITIONER

## 2024-09-13 PROCEDURE — 1125F AMNT PAIN NOTED PAIN PRSNT: CPT | Performed by: NURSE PRACTITIONER

## 2024-09-13 RX ORDER — CEPHALEXIN 500 MG/1
500 CAPSULE ORAL 2 TIMES DAILY
Qty: 14 CAPSULE | Refills: 0 | Status: SHIPPED | OUTPATIENT
Start: 2024-09-13

## 2024-09-13 NOTE — PROGRESS NOTES
"Chief Complaint   Patient presents with    Difficulty Urinating     Symptoms started approximately 1 mth ago. She is c/o back pain, urinary hesitancy and dysuria        Subjective   Denisa Michelle is a 64 y.o. female who presents today for the following     Difficulty Urinating   This is a new problem. Episode onset: 2 weeks. There has been no fever. Associated symptoms include hesitancy. Pertinent negatives include no chills.   Additional comments: Patient is currently on antibiotic.          Allergies   Allergen Reactions    Amoxicillin Diarrhea    Clindamycin/Lincomycin Hives and Rash         OBJECTIVE:  Vitals:    09/13/24 1247   BP: 98/58   BP Location: Left arm   Patient Position: Sitting   Cuff Size: Large Adult   Pulse: 98   Resp: 18   Temp: 98.1 °F (36.7 °C)   TempSrc: Infrared   SpO2: 98%   Weight: 67.2 kg (148 lb 3.2 oz)   Height: 158.8 cm (62.5\")     Physical Exam  Vitals and nursing note reviewed.   Constitutional:       Appearance: Normal appearance.   HENT:      Head: Normocephalic and atraumatic.      Nose: Nose normal.   Eyes:      Pupils: Pupils are equal, round, and reactive to light.   Cardiovascular:      Rate and Rhythm: Normal rate and regular rhythm.   Pulmonary:      Breath sounds: Normal breath sounds. No wheezing or rhonchi.   Abdominal:      General: Bowel sounds are normal.      Tenderness: There is no right CVA tenderness or left CVA tenderness.   Musculoskeletal:         General: Normal range of motion.   Skin:     General: Skin is warm and dry.   Neurological:      Mental Status: She is alert and oriented to person, place, and time.   Psychiatric:         Mood and Affect: Mood normal.         Behavior: Behavior normal.         Thought Content: Thought content normal.         Judgment: Judgment normal.                    ASSESSMENT/ PLAN:    Diagnoses and all orders for this visit:    1. Dysuria (Primary)  -     POCT urinalysis dipstick, automated  -     Urine Culture - Urine, Urine, " Clean Catch    2. Urinary hesitancy    Advised patient to continue antibiotics and increase water intake. Urine culture sent to labs.   Procedures       Follow-up: Return in about 2 months (around 11/13/2024) for Annual physical.      Nayeli Arango, NEW 9/13/2024 14:12 CDT  This note was electronically signed.

## 2024-09-17 LAB
BACTERIA UR CULT: NORMAL
BACTERIA UR CULT: NORMAL

## 2024-09-25 RX ORDER — ATORVASTATIN CALCIUM 40 MG/1
40 TABLET, FILM COATED ORAL NIGHTLY
Qty: 90 TABLET | Refills: 1 | Status: SHIPPED | OUTPATIENT
Start: 2024-09-25

## 2024-10-03 DIAGNOSIS — J30.2 SEASONAL ALLERGIES: ICD-10-CM

## 2024-10-03 NOTE — TELEPHONE ENCOUNTER
Rx Refill Note  Requested Prescriptions     Pending Prescriptions Disp Refills    Allergy Relief/Nasal Decongest  MG per 24 hr tablet [Pharmacy Med Name: ALLERGY RELIEF/NASAL DE  TB24] 15 tablet 3     Sig: TAKE ONE TABLET BY MOUTH EVERY DAY        Carmelina Conway MA  10/03/24, 14:47 CDT

## 2024-10-04 RX ORDER — LORATADINE AND PSEUDOEPHEDRINE SULFATE 10; 240 MG/1; MG/1
1 TABLET, FILM COATED, EXTENDED RELEASE ORAL DAILY
Qty: 15 TABLET | Refills: 0 | Status: SHIPPED | OUTPATIENT
Start: 2024-10-04

## 2024-10-07 DIAGNOSIS — E78.2 MIXED HYPERLIPIDEMIA: Primary | ICD-10-CM

## 2024-10-07 RX ORDER — ATORVASTATIN CALCIUM 40 MG/1
40 TABLET, FILM COATED ORAL NIGHTLY
Qty: 90 TABLET | Refills: 1 | Status: SHIPPED | OUTPATIENT
Start: 2024-10-07

## 2024-10-14 ENCOUNTER — NURSE TRIAGE (OUTPATIENT)
Dept: CALL CENTER | Facility: HOSPITAL | Age: 64
End: 2024-10-14
Payer: MEDICARE

## 2024-10-14 NOTE — TELEPHONE ENCOUNTER
Fell on Saturday walking the dog. Hit left arm and left hip. Stiff neck. Small cut to left upper forearm. Has a bandaid on it. Slight bloody draining when changing.     Would also like her [place] refilled. Please call for appointment.     Reason for Disposition   Minor cut or scratch    Additional Information   Negative: [1] Major bleeding (e.g., actively dripping or spurting) AND [2] can't be stopped   Negative: Amputation   Negative: Shock suspected (e.g., cold/pale/clammy skin, too weak to stand, low BP, rapid pulse)   Negative: [1] Knife wound (or other possibly deep cut) AND [2] to chest, abdomen, back, neck, or head   Negative: [1] Self-injury (e.g., cutting, self-harm) AND [2] suicidal or out-of-control   Negative: Sounds like a life-threatening emergency to the triager   Negative: [1] Animal bite AND [2] broken skin   Negative: [1] Human bite AND [2] broken skin   Negative: Puncture wound   Negative: [1] Bleeding AND [2] won't stop after 10 minutes of direct pressure (using correct technique)   Negative: Skin is split open or gaping (or length > 1/2 inch or 12 mm on the skin, 1/4 inch or 6 mm on the face)   Negative: [1] Deep cut AND [2] can see bone or tendons   Negative: Cut over knuckle (MCP joint)   Negative: Sensation of something in the wound (i.e., retained object in wound)   Negative: [1] Dirt in the wound AND [2] not removed with 15 minutes of scrubbing   Negative: Wound causes numbness (i.e., loss of sensation)   Negative: Wound causes weakness (i.e., decreased ability to move hand, finger, toe)   Negative: [1] SEVERE pain AND [2] not improved 2 hours after pain medicine   Negative: [1] Looks infected AND [2] large red area (> 2 inches or 5 cm) or streak   Negative: [1] Fever AND [2] spreading red area or streak   Negative: Suspicious history for the injury   Negative: [1] Looks infected (spreading redness, pus) AND [2] no fever   Negative: No prior tetanus shots (or is not fully vaccinated)    "Negative: [1] HIV positive or severe immunodeficiency (severely weak immune system) AND [2] DIRTY cut   Negative: [1] Last tetanus shot > 5 years ago AND [2] DIRTY cut   Negative: [1] Last tetanus shot > 10 years ago AND [2] CLEAN cut   Negative: [1] After 14 days AND [2] wound isn't healed   Negative: [1] Diabetes mellitus AND [2] minor cut or scratch on foot   Negative: [1] Minor cut or scratch AND [2] from self-injury (e.g., cutting, self-harm) AND [3] stable (i.e., not suicidal, not out of control)    Answer Assessment - Initial Assessment Questions  1. APPEARANCE of INJURY: \"What does the injury look like?\"       Fell on Saturday walking the dog. Hit left arm and left hip. Stiff neck. Small cut to left upper forearm. Has a bandaid on it. Slight bloody draining when changing.  2. SIZE: \"How large is the cut?\"       About an inch long   3. BLEEDING: \"Is it bleeding now?\" If Yes, ask: \"Is it difficult to stop?\"       no  4. LOCATION: \"Where is the injury located?\"       See above   5. ONSET: \"How long ago did the injury occur?\"       See above   6. MECHANISM: \"Tell me how it happened.\"       See above   7. TETANUS: \"When was the last tetanus booster?\"      Na. Just contacted sidewalk. No metal  8. PREGNANCY: \"Is there any chance you are pregnant?\" \"When was your last menstrual period?\"      na    Protocols used: Cuts and Lacerations-ADULT-AH    "

## 2024-10-16 ENCOUNTER — OFFICE VISIT (OUTPATIENT)
Dept: FAMILY MEDICINE CLINIC | Facility: CLINIC | Age: 64
End: 2024-10-16
Payer: MEDICARE

## 2024-10-16 VITALS
TEMPERATURE: 97.8 F | WEIGHT: 146.4 LBS | RESPIRATION RATE: 17 BRPM | BODY MASS INDEX: 25.94 KG/M2 | OXYGEN SATURATION: 96 % | SYSTOLIC BLOOD PRESSURE: 96 MMHG | HEIGHT: 63 IN | DIASTOLIC BLOOD PRESSURE: 64 MMHG | HEART RATE: 86 BPM

## 2024-10-16 DIAGNOSIS — F41.9 ANXIETY: ICD-10-CM

## 2024-10-16 DIAGNOSIS — M79.671 PAIN IN BOTH FEET: ICD-10-CM

## 2024-10-16 DIAGNOSIS — M54.50 CHRONIC LOW BACK PAIN, UNSPECIFIED BACK PAIN LATERALITY, UNSPECIFIED WHETHER SCIATICA PRESENT: Primary | ICD-10-CM

## 2024-10-16 DIAGNOSIS — Z23 COVID-19 VACCINE ADMINISTERED: ICD-10-CM

## 2024-10-16 DIAGNOSIS — M77.42 METATARSALGIA OF BOTH FEET: ICD-10-CM

## 2024-10-16 DIAGNOSIS — Z23 FLU VACCINE NEED: ICD-10-CM

## 2024-10-16 DIAGNOSIS — Z12.11 COLON CANCER SCREENING: ICD-10-CM

## 2024-10-16 DIAGNOSIS — M79.672 PAIN IN BOTH FEET: ICD-10-CM

## 2024-10-16 DIAGNOSIS — G89.29 CHRONIC LOW BACK PAIN, UNSPECIFIED BACK PAIN LATERALITY, UNSPECIFIED WHETHER SCIATICA PRESENT: Primary | ICD-10-CM

## 2024-10-16 DIAGNOSIS — Z79.899 LONG-TERM USE OF HIGH-RISK MEDICATION: ICD-10-CM

## 2024-10-16 DIAGNOSIS — M25.50 MULTIPLE JOINT PAIN: ICD-10-CM

## 2024-10-16 DIAGNOSIS — M25.551 BILATERAL HIP PAIN: ICD-10-CM

## 2024-10-16 DIAGNOSIS — Z95.0 PRESENCE OF CARDIAC PACEMAKER: ICD-10-CM

## 2024-10-16 DIAGNOSIS — M25.552 BILATERAL HIP PAIN: ICD-10-CM

## 2024-10-16 DIAGNOSIS — M77.41 METATARSALGIA OF BOTH FEET: ICD-10-CM

## 2024-10-16 RX ORDER — HYDROCODONE BITARTRATE AND ACETAMINOPHEN 7.5; 325 MG/1; MG/1
1 TABLET ORAL EVERY 6 HOURS PRN
Qty: 30 TABLET | Refills: 0 | Status: SHIPPED | OUTPATIENT
Start: 2024-10-16

## 2024-10-16 NOTE — PROGRESS NOTES
"Chief Complaint   Patient presents with    Back Pain     Chronic, low back    Muscle Pain     In bilateral thighs        Subjective   Denisa Michelle is a 64 y.o. female who presents today for the following: pain management referral.     She is wanting a pain management referral because she does not want to utilize primary care for her pain needs. She feels that back injections may be beneficial. She has had chronic pain for several years. Because of this, she has a tendency to fall. She does have a dog that she walks but when he gets out of control on the leash, she cannot hold on and often falls trying to.   Her pain is mainly located in her lower back and bilateral hips. She does have pain under the balls of her feet that she states is different than plantar fasciitis.    Allergies   Allergen Reactions    Amoxicillin Diarrhea    Clindamycin/Lincomycin Hives and Rash         OBJECTIVE:  Vitals:    10/16/24 0824   BP: 96/64   BP Location: Left arm   Patient Position: Sitting   Cuff Size: Large Adult   Pulse: 86   Resp: 17   Temp: 97.8 °F (36.6 °C)   TempSrc: Infrared   SpO2: 96%   Weight: 66.4 kg (146 lb 6.4 oz)   Height: 158.8 cm (62.5\")   Patient has been erroneously marked as diabetic. Based on the available clinical information, she does not have diabetes and should therefore be excluded from diabetic health maintenance and quality measures for the remainder of the reporting period.   Physical Exam  Musculoskeletal:      Lumbar back: Spasms and tenderness present. Decreased range of motion.      Right hip: Tenderness and crepitus present. Decreased range of motion. Decreased strength.      Left hip: Tenderness and crepitus present. Decreased range of motion. Decreased strength.   Feet:      Comments: Pain under the ball of both feet                   ASSESSMENT/ PLAN:    Diagnoses and all orders for this visit:    1. Chronic low back pain, unspecified back pain laterality, unspecified whether sciatica present " (Primary)  -     Compliance Drug Analysis, Ur - Urine, Clean Catch    2. Long-term use of high-risk medication  -     Compliance Drug Analysis, Ur - Urine, Clean Catch    3. Multiple joint pain  -     HYDROcodone-acetaminophen (NORCO) 7.5-325 MG per tablet; Take 1 tablet by mouth Every 6 (Six) Hours As Needed for Moderate Pain.  Dispense: 30 tablet; Refill: 0  -     Ambulatory Referral to Pain Management    4. Metatarsalgia of both feet    5. Anxiety  -     Compliance Drug Analysis, Ur - Urine, Clean Catch    6. Flu vaccine need  -     Fluzone High-Dose 65+yrs ([id number])    7. COVID-19 vaccine administered  -     COVID-19 (Pfizer) 12yrs+ (COMIRNATY)    8. Colon cancer screening  -     Cologuard - Stool, Per Rectum; Future    9. Pain in both feet    10. Bilateral hip pain      Procedures     Management Plan:   I will fill her pain medications until she can get an appointment with Dr. Cotton.   An After Visit Summary was printed and given to the patient at discharge.    Follow-up: Return in about 7 weeks (around 12/2/2024) for MEDICARE WELLNESS WITH LABS PRIOR.         NEW Trivedi 10/16/2024 10:07 CDT  This note was electronically signed.

## 2024-10-18 ENCOUNTER — OFFICE VISIT (OUTPATIENT)
Dept: NEUROSURGERY | Age: 64
End: 2024-10-18
Payer: MEDICARE

## 2024-10-18 VITALS
SYSTOLIC BLOOD PRESSURE: 108 MMHG | DIASTOLIC BLOOD PRESSURE: 62 MMHG | BODY MASS INDEX: 25.87 KG/M2 | HEART RATE: 86 BPM | HEIGHT: 63 IN | WEIGHT: 146 LBS | OXYGEN SATURATION: 92 %

## 2024-10-18 DIAGNOSIS — M48.061 LUMBAR FORAMINAL STENOSIS: ICD-10-CM

## 2024-10-18 DIAGNOSIS — M51.360 DEGENERATION OF INTERVERTEBRAL DISC OF LUMBAR REGION WITH DISCOGENIC BACK PAIN: ICD-10-CM

## 2024-10-18 DIAGNOSIS — G89.29 CHRONIC MIDLINE LOW BACK PAIN WITHOUT SCIATICA: ICD-10-CM

## 2024-10-18 DIAGNOSIS — S32.010A CLOSED COMPRESSION FRACTURE OF BODY OF L1 VERTEBRA (HCC): Primary | ICD-10-CM

## 2024-10-18 DIAGNOSIS — M54.50 CHRONIC MIDLINE LOW BACK PAIN WITHOUT SCIATICA: ICD-10-CM

## 2024-10-18 PROCEDURE — G8427 DOCREV CUR MEDS BY ELIG CLIN: HCPCS | Performed by: NURSE PRACTITIONER

## 2024-10-18 PROCEDURE — G8419 CALC BMI OUT NRM PARAM NOF/U: HCPCS | Performed by: NURSE PRACTITIONER

## 2024-10-18 PROCEDURE — G8484 FLU IMMUNIZE NO ADMIN: HCPCS | Performed by: NURSE PRACTITIONER

## 2024-10-18 PROCEDURE — 3017F COLORECTAL CA SCREEN DOC REV: CPT | Performed by: NURSE PRACTITIONER

## 2024-10-18 PROCEDURE — 1123F ACP DISCUSS/DSCN MKR DOCD: CPT | Performed by: NURSE PRACTITIONER

## 2024-10-18 PROCEDURE — 1090F PRES/ABSN URINE INCON ASSESS: CPT | Performed by: NURSE PRACTITIONER

## 2024-10-18 PROCEDURE — G8400 PT W/DXA NO RESULTS DOC: HCPCS | Performed by: NURSE PRACTITIONER

## 2024-10-18 PROCEDURE — 99214 OFFICE O/P EST MOD 30 MIN: CPT | Performed by: NURSE PRACTITIONER

## 2024-10-18 PROCEDURE — 4004F PT TOBACCO SCREEN RCVD TLK: CPT | Performed by: NURSE PRACTITIONER

## 2024-10-18 RX ORDER — ALPRAZOLAM 1 MG
1 TABLET ORAL NIGHTLY PRN
COMMUNITY
Start: 2024-10-09

## 2024-10-18 ASSESSMENT — ENCOUNTER SYMPTOMS
GASTROINTESTINAL NEGATIVE: 1
EYES NEGATIVE: 1
BACK PAIN: 1
RESPIRATORY NEGATIVE: 1

## 2024-10-18 NOTE — PROGRESS NOTES
Review of Systems   Constitutional: Negative.    HENT: Negative.     Eyes: Negative.    Respiratory: Negative.     Cardiovascular: Negative.    Gastrointestinal: Negative.    Genitourinary: Negative.    Musculoskeletal:  Positive for back pain and falls.   Skin: Negative.    Neurological: Negative.    Endo/Heme/Allergies: Negative.    Psychiatric/Behavioral: Negative.         
12/01/2017    ALT 39 (H) 12/01/2017    LABGLOM >60 12/01/2017     Lab Results   Component Value Date    INR 1.02 08/19/2014    PROTIME 13.1 08/19/2014     MRI Lumbar Spine (2/28/2024) [facility]  I have personally reviewed these images and my interpretation is:  Acute compression fracture of superior endplate of L1, however, this was dated 7 months ago   L5-S1 mild to moderate left foraminal stenosis         ASSESSMENT:    Viviane Hammond is a 64 y.o. female with complaints of back pain, left hip pain and generalized pain throughout.        ICD-10-CM    1. Closed compression fracture of body of L1 vertebra (HCC)  S32.010A       2. Lumbar foraminal stenosis  M48.061       3. Degeneration of intervertebral disc of lumbar region with discogenic back pain  M51.360       4. Chronic midline low back pain without sciatica  M54.50     G89.29             PLAN:  -Discussed obtaining a new X-ray today to see if there is a new fracture or if the existing fracture has worsened since her new fall.  I feel that this may be low yield as her pain does not seem to be debilitating to her.  She is okay with holding off for now.  At this point I do not feel that there is any neurosurgical intervention that we can offer to dramatically improve her existing pain syndrome.  She states that someone has talked to her about pain management and she already has a referral in place.  I think this is a good idea.  She knows to call our team should she have an increased pain or worsening symptoms.  -Follow up as needed for now     This dictation was generated by voice recognition computer software.  Although all attempts are made to edit the dictation for accuracy, there may be errors in the transcription that are not intended.      MONO Rodrigues

## 2024-10-27 LAB — DRUGS UR: NORMAL

## 2024-10-28 ENCOUNTER — OFFICE VISIT (OUTPATIENT)
Dept: FAMILY MEDICINE CLINIC | Facility: CLINIC | Age: 64
End: 2024-10-28
Payer: MEDICARE

## 2024-10-28 VITALS
HEART RATE: 81 BPM | SYSTOLIC BLOOD PRESSURE: 108 MMHG | TEMPERATURE: 97.8 F | WEIGHT: 147.8 LBS | BODY MASS INDEX: 26.19 KG/M2 | OXYGEN SATURATION: 97 % | HEIGHT: 63 IN | RESPIRATION RATE: 18 BRPM | DIASTOLIC BLOOD PRESSURE: 58 MMHG

## 2024-10-28 DIAGNOSIS — M25.50 MULTIPLE JOINT PAIN: ICD-10-CM

## 2024-10-28 PROCEDURE — 1125F AMNT PAIN NOTED PAIN PRSNT: CPT | Performed by: NURSE PRACTITIONER

## 2024-10-28 PROCEDURE — 99213 OFFICE O/P EST LOW 20 MIN: CPT | Performed by: NURSE PRACTITIONER

## 2024-10-28 RX ORDER — HYDROCODONE BITARTRATE AND ACETAMINOPHEN 7.5; 325 MG/1; MG/1
1 TABLET ORAL EVERY 6 HOURS PRN
Qty: 30 TABLET | Refills: 0 | Status: SHIPPED | OUTPATIENT
Start: 2024-10-28

## 2024-10-28 NOTE — PROGRESS NOTES
"Chief Complaint   Patient presents with    Back Pain        Subjective   Denisa Michelle is a 64 y.o. female who presents today for the following: back pain.    HPI   She continues to have back pain. She still has two pain pills left and would like a renewal. She has not heard from pain management. I checked with out referral nurse and she said that it is taking 5-6 weeks to get an appointment. She is just wanting coverage until she sees pain management.    Allergies   Allergen Reactions    Amoxicillin Diarrhea    Clindamycin/Lincomycin Hives and Rash         OBJECTIVE:  Vitals:    10/28/24 0946   BP: 108/58   BP Location: Left arm   Patient Position: Sitting   Cuff Size: Large Adult   Pulse: 81   Resp: 18   Temp: 97.8 °F (36.6 °C)   TempSrc: Infrared   SpO2: 97%   Weight: 67 kg (147 lb 12.8 oz)   Height: 158.8 cm (62.5\")     Physical Exam  Musculoskeletal:      Lumbar back: Spasms and tenderness present. Decreased range of motion.      Right hip: Tenderness and crepitus present. Decreased range of motion. Decreased strength.      Left hip: Tenderness and crepitus present. Decreased range of motion. Decreased strength.                    ASSESSMENT/ PLAN:    Diagnoses and all orders for this visit:    1. Multiple joint pain  -     HYDROcodone-acetaminophen (NORCO) 7.5-325 MG per tablet; Take 1 tablet by mouth Every 6 (Six) Hours As Needed for Moderate Pain.  Dispense: 30 tablet; Refill: 0      Procedures     Management Plan:   Keep appointment with pain management.  An After Visit Summary was printed and given to the patient at discharge.    Follow-up: Return if symptoms worsen or fail to improve.         Shelli Da Silva, NEW 10/28/2024 10:19 CDT  This note was electronically signed.          "

## 2024-11-01 LAB — NONINV COLON CA DNA+OCC BLD SCRN STL QL: NEGATIVE

## 2024-11-07 ENCOUNTER — OFFICE VISIT (OUTPATIENT)
Dept: FAMILY MEDICINE CLINIC | Facility: CLINIC | Age: 64
End: 2024-11-07
Payer: MEDICARE

## 2024-11-07 VITALS
SYSTOLIC BLOOD PRESSURE: 84 MMHG | RESPIRATION RATE: 16 BRPM | HEART RATE: 65 BPM | HEIGHT: 63 IN | TEMPERATURE: 98 F | OXYGEN SATURATION: 95 % | DIASTOLIC BLOOD PRESSURE: 57 MMHG | WEIGHT: 146 LBS | BODY MASS INDEX: 25.87 KG/M2

## 2024-11-07 DIAGNOSIS — M25.50 MULTIPLE JOINT PAIN: ICD-10-CM

## 2024-11-07 DIAGNOSIS — I95.0 IDIOPATHIC HYPOTENSION: ICD-10-CM

## 2024-11-07 DIAGNOSIS — M51.362 DEGENERATION OF INTERVERTEBRAL DISC OF LUMBAR REGION WITH DISCOGENIC BACK PAIN AND LOWER EXTREMITY PAIN: Primary | ICD-10-CM

## 2024-11-07 PROCEDURE — 99213 OFFICE O/P EST LOW 20 MIN: CPT | Performed by: NURSE PRACTITIONER

## 2024-11-07 PROCEDURE — 1159F MED LIST DOCD IN RCRD: CPT | Performed by: NURSE PRACTITIONER

## 2024-11-07 PROCEDURE — 1160F RVW MEDS BY RX/DR IN RCRD: CPT | Performed by: NURSE PRACTITIONER

## 2024-11-07 PROCEDURE — 1125F AMNT PAIN NOTED PAIN PRSNT: CPT | Performed by: NURSE PRACTITIONER

## 2024-11-07 RX ORDER — MIDODRINE HYDROCHLORIDE 5 MG/1
5 TABLET ORAL
Qty: 90 TABLET | Refills: 2 | Status: SHIPPED | OUTPATIENT
Start: 2024-11-07

## 2024-11-07 RX ORDER — HYDROCODONE BITARTRATE AND ACETAMINOPHEN 7.5; 325 MG/1; MG/1
1 TABLET ORAL EVERY 6 HOURS PRN
Qty: 30 TABLET | Refills: 0 | Status: SHIPPED | OUTPATIENT
Start: 2024-11-07

## 2024-11-07 NOTE — PROGRESS NOTES
"Chief Complaint   Patient presents with    Back Pain        Subjective   Denisa Michelle is a 64 y.o. female who presents today for the following:  Back pain  Back Pain       She continues with back pain and she needs pain medication to get her through until she sees pain management. It was exacerbated by driving to see her sick aunt and the care ride was rough on her.  She also runs hypotensive most of the time and feels run down.    Allergies   Allergen Reactions    Amoxicillin Diarrhea    Clindamycin/Lincomycin Hives and Rash         OBJECTIVE:  Vitals:    11/07/24 1304   BP: (!) 84/57   Pulse: 65   Resp: 16   Temp: 98 °F (36.7 °C)   SpO2: 95%   Weight: 66.2 kg (146 lb)   Height: 158.8 cm (62.5\")     Physical Exam  Vitals and nursing note reviewed.   Constitutional:       Appearance: Normal appearance.   Cardiovascular:      Rate and Rhythm: Normal rate and regular rhythm.      Pulses: Normal pulses.      Heart sounds: Normal heart sounds.   Pulmonary:      Effort: Pulmonary effort is normal.      Breath sounds: Normal breath sounds.   Musculoskeletal:      Lumbar back: Spasms and tenderness present. Decreased range of motion.   Skin:     General: Skin is warm and dry.   Neurological:      General: No focal deficit present.      Mental Status: She is alert and oriented to person, place, and time.   Psychiatric:         Mood and Affect: Mood normal.         Behavior: Behavior normal.         Thought Content: Thought content normal.         Judgment: Judgment normal.                    ASSESSMENT/ PLAN:    Diagnoses and all orders for this visit:    1. Degeneration of intervertebral disc of lumbar region with discogenic back pain and lower extremity pain (Primary)    2. Multiple joint pain  -     HYDROcodone-acetaminophen (NORCO) 7.5-325 MG per tablet; Take 1 tablet by mouth Every 6 (Six) Hours As Needed for Moderate Pain.  Dispense: 30 tablet; Refill: 0    3. Idiopathic hypotension  -     midodrine (PROAMATINE) 5 MG " tablet; Take 1 tablet by mouth 3 (Three) Times a Day Before Meals.  Dispense: 90 tablet; Refill: 2      Procedures     Management Plan:   Continue present treatment.  An After Visit Summary was printed and given to the patient at discharge.    Follow-up: Return if symptoms worsen or fail to improve especially HTN.         Shelli Da Silva, NEW 11/7/2024 15:17 CST  This note was electronically signed.

## 2024-11-18 ENCOUNTER — OFFICE VISIT (OUTPATIENT)
Dept: FAMILY MEDICINE CLINIC | Facility: CLINIC | Age: 64
End: 2024-11-18
Payer: MEDICARE

## 2024-11-18 VITALS
RESPIRATION RATE: 17 BRPM | HEART RATE: 74 BPM | SYSTOLIC BLOOD PRESSURE: 101 MMHG | HEIGHT: 63 IN | OXYGEN SATURATION: 95 % | DIASTOLIC BLOOD PRESSURE: 58 MMHG | WEIGHT: 145 LBS | BODY MASS INDEX: 25.69 KG/M2 | TEMPERATURE: 97.5 F

## 2024-11-18 DIAGNOSIS — Z79.899 LONG-TERM USE OF HIGH-RISK MEDICATION: Primary | ICD-10-CM

## 2024-11-18 DIAGNOSIS — E55.9 VITAMIN D DEFICIENCY: ICD-10-CM

## 2024-11-18 DIAGNOSIS — J30.2 SEASONAL ALLERGIES: ICD-10-CM

## 2024-11-18 DIAGNOSIS — M25.50 MULTIPLE JOINT PAIN: ICD-10-CM

## 2024-11-18 DIAGNOSIS — E03.9 ACQUIRED HYPOTHYROIDISM: ICD-10-CM

## 2024-11-18 DIAGNOSIS — M54.50 CHRONIC LOW BACK PAIN, UNSPECIFIED BACK PAIN LATERALITY, UNSPECIFIED WHETHER SCIATICA PRESENT: ICD-10-CM

## 2024-11-18 DIAGNOSIS — G89.29 CHRONIC LOW BACK PAIN, UNSPECIFIED BACK PAIN LATERALITY, UNSPECIFIED WHETHER SCIATICA PRESENT: ICD-10-CM

## 2024-11-18 DIAGNOSIS — F41.9 ANXIETY: ICD-10-CM

## 2024-11-18 PROCEDURE — 99214 OFFICE O/P EST MOD 30 MIN: CPT | Performed by: NURSE PRACTITIONER

## 2024-11-18 PROCEDURE — 1159F MED LIST DOCD IN RCRD: CPT | Performed by: NURSE PRACTITIONER

## 2024-11-18 PROCEDURE — 1160F RVW MEDS BY RX/DR IN RCRD: CPT | Performed by: NURSE PRACTITIONER

## 2024-11-18 PROCEDURE — 1125F AMNT PAIN NOTED PAIN PRSNT: CPT | Performed by: NURSE PRACTITIONER

## 2024-11-18 RX ORDER — HYDROCODONE BITARTRATE AND ACETAMINOPHEN 7.5; 325 MG/1; MG/1
1 TABLET ORAL EVERY 6 HOURS PRN
Qty: 30 TABLET | Refills: 0 | Status: SHIPPED | OUTPATIENT
Start: 2024-11-18

## 2024-11-18 RX ORDER — LORATADINE AND PSEUDOEPHEDRINE SULFATE 10; 240 MG/1; MG/1
1 TABLET, FILM COATED, EXTENDED RELEASE ORAL DAILY
Qty: 15 TABLET | Refills: 0 | Status: SHIPPED | OUTPATIENT
Start: 2024-11-18

## 2024-11-18 RX ORDER — ERGOCALCIFEROL 1.25 MG/1
50000 CAPSULE, LIQUID FILLED ORAL WEEKLY
Qty: 12 CAPSULE | Refills: 4 | Status: SHIPPED | OUTPATIENT
Start: 2024-11-18

## 2024-11-18 RX ORDER — LEVOTHYROXINE SODIUM 50 UG/1
50 TABLET ORAL DAILY
Qty: 90 TABLET | Refills: 1 | Status: SHIPPED | OUTPATIENT
Start: 2024-11-18

## 2024-11-18 NOTE — PROGRESS NOTES
Chief Complaint   Patient presents with    Anxiety     Sees psychiatrist for Anxiety and  medication management    Pain     Low back, needs Norco refilled    Compliance Visit     3 mth         Subjective   Denisa Michelle is a 64 y.o. female who presents today for the following: compliance visit.    Anxiety  Presents for follow-up visit.  Symptoms include depressed mood, dry mouth and nervous/anxious behavior. Symptoms occur most days. The most recent episode lasted 3 hours. The severity of symptoms is severe. The quality of sleep is good. Awakens seldom during the night. Past treatments include nothing.   Pain  This is a chronic problem. The current episode started more than 1 year ago. The problem occurs constantly. The problem has been gradually worsening. Associated symptoms include arthralgias and myalgias. The symptoms are aggravated by standing, twisting, walking, bending and exertion. She has tried oral narcotics for the symptoms. The treatment provided moderate relief.      She would like to get her norco renewed for back pain. Pain management still has not reached out to her for an appointment. The medicine is still working well for her.  She also wants a refill on her vitamin D and generic claritin D.   [facility] [place], and controlled substance contract in EMR. Advised patient of risks associated with controlled substances including physical and mental dependence, abuse, and mental impairment. Advised patient to use least amount of possible and never overuse or share medications with other people. Patient states understanding of risks and instructions on proper use.      Allergies   Allergen Reactions    Amoxicillin Diarrhea    Clindamycin/Lincomycin Hives and Rash         OBJECTIVE:  Vitals:    11/18/24 1310   BP: 101/58   BP Location: Left arm   Patient Position: Sitting   Cuff Size: Adult   Pulse: 74   Resp: 17   Temp: 97.5 °F (36.4 °C)   TempSrc: Infrared   SpO2: 95%   Weight: 65.8 kg (145 lb)   Height:  "158.8 cm (62.5\")     Physical Exam  Vitals and nursing note reviewed.   Constitutional:       Appearance: Normal appearance.   Cardiovascular:      Rate and Rhythm: Normal rate and regular rhythm.      Pulses: Normal pulses.      Heart sounds: Normal heart sounds.   Pulmonary:      Effort: Pulmonary effort is normal.      Breath sounds: Normal breath sounds.   Musculoskeletal:      Lumbar back: Spasms, tenderness and bony tenderness present. Decreased range of motion.   Skin:     General: Skin is warm and dry.   Neurological:      General: No focal deficit present.      Mental Status: She is alert and oriented to person, place, and time.   Psychiatric:         Mood and Affect: Mood normal.         Behavior: Behavior normal.         Thought Content: Thought content normal.         Judgment: Judgment normal.                    ASSESSMENT/ PLAN:    Diagnoses and all orders for this visit:    1. Long-term use of high-risk medication (Primary)  -     Compliance Drug Analysis, Ur - Urine, Clean Catch    2. Anxiety  -     Compliance Drug Analysis, Ur - Urine, Clean Catch    3. Chronic low back pain, unspecified back pain laterality, unspecified whether sciatica present  -     Compliance Drug Analysis, Ur - Urine, Clean Catch  -     HYDROcodone-acetaminophen (NORCO) 7.5-325 MG per tablet; Take 1 tablet by mouth Every 6 (Six) Hours As Needed for Moderate Pain.  Dispense: 30 tablet; Refill: 0    4. Multiple joint pain  -     HYDROcodone-acetaminophen (NORCO) 7.5-325 MG per tablet; Take 1 tablet by mouth Every 6 (Six) Hours As Needed for Moderate Pain.  Dispense: 30 tablet; Refill: 0    5. Seasonal allergies  -     loratadine-pseudoephedrine (Allergy Relief/Nasal Decongest)  MG per 24 hr tablet; Take 1 tablet by mouth Daily.  Dispense: 15 tablet; Refill: 0    6. Vitamin D deficiency  -     vitamin D (ERGOCALCIFEROL) 1.25 MG (65501 UT) capsule capsule; Take 1 capsule by mouth 1 (One) Time Per Week.  Dispense: 12 capsule; " Refill: 4    7. Acquired hypothyroidism  -     levothyroxine (SYNTHROID, LEVOTHROID) 50 MCG tablet; Take 1 tablet by mouth Daily.  Dispense: 90 tablet; Refill: 1      Procedures     Management Plan:   Keep pain management appointment when offered.  An After Visit Summary was printed and given to the patient at discharge.    Follow-up: Return if symptoms worsen or fail to improve.    I spent 33 minutes caring for Denisa on this date of service. This time includes time spent by me in the following activities: preparing for the visit, reviewing tests, performing a medically appropriate examination and/or evaluation, counseling and educating the patient/family/caregiver, referring and communicating with other health care professionals, documenting information in the medical record, and ordering medications.      Shelli Da Silva, NEW 11/18/2024 13:36 CST  This note was electronically signed.

## 2024-11-24 LAB — DRUGS UR: NORMAL

## 2024-11-27 ENCOUNTER — OFFICE VISIT (OUTPATIENT)
Dept: FAMILY MEDICINE CLINIC | Facility: CLINIC | Age: 64
End: 2024-11-27
Payer: MEDICARE

## 2024-11-27 VITALS
HEART RATE: 103 BPM | TEMPERATURE: 98.1 F | WEIGHT: 143.4 LBS | RESPIRATION RATE: 18 BRPM | SYSTOLIC BLOOD PRESSURE: 89 MMHG | DIASTOLIC BLOOD PRESSURE: 64 MMHG | OXYGEN SATURATION: 97 % | BODY MASS INDEX: 25.41 KG/M2 | HEIGHT: 63 IN

## 2024-11-27 DIAGNOSIS — M25.50 MULTIPLE JOINT PAIN: ICD-10-CM

## 2024-11-27 DIAGNOSIS — M54.50 CHRONIC LOW BACK PAIN, UNSPECIFIED BACK PAIN LATERALITY, UNSPECIFIED WHETHER SCIATICA PRESENT: ICD-10-CM

## 2024-11-27 DIAGNOSIS — G89.29 CHRONIC LOW BACK PAIN, UNSPECIFIED BACK PAIN LATERALITY, UNSPECIFIED WHETHER SCIATICA PRESENT: ICD-10-CM

## 2024-11-27 RX ORDER — HYDROCODONE BITARTRATE AND ACETAMINOPHEN 7.5; 325 MG/1; MG/1
1 TABLET ORAL EVERY 6 HOURS PRN
Qty: 40 TABLET | Refills: 0 | Status: SHIPPED | OUTPATIENT
Start: 2024-11-27

## 2024-11-27 NOTE — PROGRESS NOTES
"Chief Complaint   Patient presents with    Back Pain        Subjective   Denisa Michelle is a 64 y.o. female who presents today for the following:  Back pain  Back Pain  This is a chronic problem. The current episode started more than 1 year ago. The problem occurs constantly. The problem has been worse since onset. The pain is present in the lumbar spine. The quality of the pain is described as shooting and stabbing. The pain radiates to the left buttock and right buttock. The pain is severe. The pain is The same all the time. The symptoms are aggravated by bending, lying down, stress, position, standing and twisting. Stiffness is present All day. Associated symptoms include tingling, weakness and weight loss. Risk factors include history of steroid use, sedentary lifestyle and poor posture. She has tried NSAIDs and analgesics for the symptoms. The treatment provided moderate relief.      She is having pain anytime she has to move very much. She knows that it is a holiday    Allergies   Allergen Reactions    Amoxicillin Diarrhea    Clindamycin/Lincomycin Hives and Rash         OBJECTIVE:  Vitals:    11/27/24 0819   BP: (!) 89/64   Pulse: 103   Resp: 18   Temp: 98.1 °F (36.7 °C)   SpO2: 97%   Weight: 65 kg (143 lb 6.4 oz)   Height: 158.8 cm (62.5\")     Physical Exam  Vitals and nursing note reviewed.   Constitutional:       Appearance: Normal appearance.   Cardiovascular:      Rate and Rhythm: Normal rate and regular rhythm.      Pulses: Normal pulses.      Heart sounds: Normal heart sounds.   Pulmonary:      Effort: Pulmonary effort is normal.      Breath sounds: Normal breath sounds.   Musculoskeletal:      Right shoulder: Tenderness and crepitus present. Decreased range of motion.      Left shoulder: Tenderness and crepitus present. Decreased range of motion.      Right hand: Decreased range of motion. Decreased strength.      Left hand: Decreased range of motion. Decreased strength.      Lumbar back: Spasms, " tenderness and bony tenderness present. Decreased range of motion.      Right hip: Tenderness and crepitus present. Decreased range of motion.      Left hip: Tenderness and crepitus present. Decreased range of motion.   Skin:     General: Skin is warm and dry.   Neurological:      General: No focal deficit present.      Mental Status: She is alert and oriented to person, place, and time.   Psychiatric:         Mood and Affect: Mood normal.         Behavior: Behavior normal.         Thought Content: Thought content normal.         Judgment: Judgment normal.                    ASSESSMENT/ PLAN:    Diagnoses and all orders for this visit:    1. Chronic low back pain, unspecified back pain laterality, unspecified whether sciatica present  -     HYDROcodone-acetaminophen (NORCO) 7.5-325 MG per tablet; Take 1 tablet by mouth Every 6 (Six) Hours As Needed for Moderate Pain.  Dispense: 40 tablet; Refill: 0    2. Multiple joint pain  -     HYDROcodone-acetaminophen (NORCO) 7.5-325 MG per tablet; Take 1 tablet by mouth Every 6 (Six) Hours As Needed for Moderate Pain.  Dispense: 40 tablet; Refill: 0      Procedures     Management Plan:   Keep pain management appointment.  An After Visit Summary was printed and given to the patient at discharge.    Follow-up: Return if symptoms worsen or fail to improve.         Shelli Da Silva, NEW 11/27/2024 08:49 CST  This note was electronically signed.

## 2024-12-30 ENCOUNTER — OFFICE VISIT (OUTPATIENT)
Dept: FAMILY MEDICINE CLINIC | Facility: CLINIC | Age: 64
End: 2024-12-30
Payer: MEDICARE

## 2024-12-30 VITALS
SYSTOLIC BLOOD PRESSURE: 120 MMHG | HEART RATE: 74 BPM | WEIGHT: 141 LBS | RESPIRATION RATE: 17 BRPM | TEMPERATURE: 98.6 F | BODY MASS INDEX: 24.98 KG/M2 | HEIGHT: 63 IN | OXYGEN SATURATION: 96 % | DIASTOLIC BLOOD PRESSURE: 64 MMHG

## 2024-12-30 DIAGNOSIS — J01.00 ACUTE NON-RECURRENT MAXILLARY SINUSITIS: Primary | ICD-10-CM

## 2024-12-30 PROCEDURE — 1125F AMNT PAIN NOTED PAIN PRSNT: CPT | Performed by: NURSE PRACTITIONER

## 2024-12-30 PROCEDURE — 1160F RVW MEDS BY RX/DR IN RCRD: CPT | Performed by: NURSE PRACTITIONER

## 2024-12-30 PROCEDURE — 1159F MED LIST DOCD IN RCRD: CPT | Performed by: NURSE PRACTITIONER

## 2024-12-30 PROCEDURE — 99213 OFFICE O/P EST LOW 20 MIN: CPT | Performed by: NURSE PRACTITIONER

## 2024-12-30 RX ORDER — PROMETHAZINE HYDROCHLORIDE AND CODEINE PHOSPHATE 6.25; 1 MG/5ML; MG/5ML
5 SOLUTION ORAL EVERY 4 HOURS PRN
Qty: 180 ML | Refills: 0 | Status: SHIPPED | OUTPATIENT
Start: 2024-12-30

## 2024-12-30 RX ORDER — CEPHALEXIN 500 MG/1
1000 CAPSULE ORAL 2 TIMES DAILY
Qty: 40 CAPSULE | Refills: 0 | Status: SHIPPED | OUTPATIENT
Start: 2024-12-30

## 2024-12-30 RX ORDER — METHYLPREDNISOLONE 4 MG/1
TABLET ORAL
Qty: 1 EACH | Refills: 0 | Status: SHIPPED | OUTPATIENT
Start: 2024-12-30

## 2024-12-30 RX ORDER — AZELASTINE 1 MG/ML
1 SPRAY, METERED NASAL 2 TIMES DAILY
COMMUNITY
Start: 2024-11-27

## 2024-12-30 NOTE — PROGRESS NOTES
"Chief Complaint   Patient presents with    Cough    URI    Headache     Symptoms started last Friday        Subjective   Denisa Michelle is a 64 y.o. female who presents today for the following: URI symptoms    She has been sitting with her aunt who has passed away and during that time she developed sinus congestion and cough. She was around sick people in the hospital.    Allergies   Allergen Reactions    Amoxicillin Diarrhea    Clindamycin/Lincomycin Hives and Rash         OBJECTIVE:  Vitals:    12/30/24 1252   BP: 120/64   BP Location: Left arm   Patient Position: Sitting   Cuff Size: Adult   Pulse: 74   Resp: 17   Temp: 98.6 °F (37 °C)   TempSrc: Oral   SpO2: 96%   Weight: 64 kg (141 lb)   Height: 158.8 cm (62.5\")     Physical Exam  HENT:      Right Ear: Tympanic membrane is bulging.      Left Ear: Tympanic membrane is bulging.      Nose: Congestion and rhinorrhea present.   Pulmonary:      Breath sounds: Examination of the right-upper field reveals decreased breath sounds. Examination of the left-upper field reveals decreased breath sounds. Examination of the right-lower field reveals decreased breath sounds. Examination of the left-lower field reveals decreased breath sounds.         BMI is >= 25 and <30. (Overweight) The following options were offered after discussion;: weight loss educational material (shared in after visit summary), exercise counseling/recommendations, and nutrition counseling/recommendations          ASSESSMENT/ PLAN:    Diagnoses and all orders for this visit:    1. Acute non-recurrent maxillary sinusitis (Primary)  -     promethazine-codeine (PHENERGAN with CODEINE) 6.25-10 MG/5ML solution; Take 5 mL by mouth Every 4 (Four) Hours As Needed for Cough.  Dispense: 180 mL; Refill: 0  -     cephalexin (Keflex) 500 MG capsule; Take 2 capsules by mouth 2 (Two) Times a Day.  Dispense: 40 capsule; Refill: 0  -     methylPREDNISolone (MEDROL) 4 MG dose pack; Take as directed on package instructions. "  Dispense: 1 each; Refill: 0      Procedures     Management Plan:   Complete all antibiotics.  An After Visit Summary was printed and given to the patient at discharge.    Follow-up: Return if symptoms worsen or fail to improve.         Shelli Da Silva, NEW 12/30/2024 13:17 CST  This note was electronically signed.

## 2025-01-28 ENCOUNTER — OFFICE VISIT (OUTPATIENT)
Dept: FAMILY MEDICINE CLINIC | Facility: CLINIC | Age: 65
End: 2025-01-28
Payer: MEDICARE

## 2025-01-28 VITALS
TEMPERATURE: 97.7 F | HEIGHT: 63 IN | WEIGHT: 141 LBS | OXYGEN SATURATION: 94 % | SYSTOLIC BLOOD PRESSURE: 100 MMHG | BODY MASS INDEX: 24.98 KG/M2 | DIASTOLIC BLOOD PRESSURE: 65 MMHG | HEART RATE: 84 BPM

## 2025-01-28 DIAGNOSIS — L89.321 PRESSURE INJURY OF LEFT BUTTOCK, STAGE 1: Primary | ICD-10-CM

## 2025-01-28 PROCEDURE — 1125F AMNT PAIN NOTED PAIN PRSNT: CPT | Performed by: NURSE PRACTITIONER

## 2025-01-28 PROCEDURE — 99213 OFFICE O/P EST LOW 20 MIN: CPT | Performed by: NURSE PRACTITIONER

## 2025-01-28 RX ORDER — CEPHALEXIN 500 MG/1
500 CAPSULE ORAL 2 TIMES DAILY
Qty: 14 CAPSULE | Refills: 0 | Status: SHIPPED | OUTPATIENT
Start: 2025-01-28

## 2025-01-28 NOTE — PROGRESS NOTES
"Chief Complaint   Patient presents with    sore on buttock     Pt here with c/o sore on left buttock. Pt noticed area on Sunday and states the area is painful.         Subjective   Denisa Michelle is a 64 y.o. female who presents today for the following: sore on left buttock.    HPI   Over the past two days she had pain on the left buttock area. When she took a shower yesterday she felt something on her buttock. She looked at it in the mirror and it looked like a necrotic area.     Allergies   Allergen Reactions    Amoxicillin Diarrhea    Clindamycin/Lincomycin Hives and Rash         OBJECTIVE:  Vitals:    01/28/25 1336   BP: 100/65   BP Location: Left arm   Patient Position: Sitting   Cuff Size: Adult   Pulse: 84   Temp: 97.7 °F (36.5 °C)   TempSrc: Infrared   SpO2: 94%   Weight: 64 kg (141 lb)   Height: 158.8 cm (62.5\")     Physical Exam  Genitourinary:     Comments: 3 cm wound on left buttock with a black scab in the middle and excoriation surrounding it. There is no exudate or signs or symptoms of infection.                   ASSESSMENT/ PLAN:    Diagnoses and all orders for this visit:    1. Pressure injury of left buttock, stage 1 (Primary)  -     cephalexin (Keflex) 500 MG capsule; Take 1 capsule by mouth 2 (Two) Times a Day.  Dispense: 14 capsule; Refill: 0      Procedures     Management Plan:   Keep area clean and dry.  Complete all antibiotics.  An After Visit Summary was printed and given to the patient at discharge.    Follow-up: No follow-ups on file.         Shelli Da Silva, APRN 1/28/2025 13:59 CST  This note was electronically signed.          "

## 2025-02-20 ENCOUNTER — OFFICE VISIT (OUTPATIENT)
Dept: FAMILY MEDICINE CLINIC | Facility: CLINIC | Age: 65
End: 2025-02-20
Payer: MEDICARE

## 2025-02-20 VITALS
HEART RATE: 98 BPM | HEIGHT: 63 IN | RESPIRATION RATE: 18 BRPM | TEMPERATURE: 98.1 F | DIASTOLIC BLOOD PRESSURE: 58 MMHG | WEIGHT: 144.4 LBS | OXYGEN SATURATION: 99 % | BODY MASS INDEX: 25.59 KG/M2 | SYSTOLIC BLOOD PRESSURE: 112 MMHG

## 2025-02-20 DIAGNOSIS — M25.50 MULTIPLE JOINT PAIN: ICD-10-CM

## 2025-02-20 DIAGNOSIS — B35.9 TINEA: Primary | ICD-10-CM

## 2025-02-20 PROCEDURE — 99213 OFFICE O/P EST LOW 20 MIN: CPT | Performed by: NURSE PRACTITIONER

## 2025-02-20 PROCEDURE — 1125F AMNT PAIN NOTED PAIN PRSNT: CPT | Performed by: NURSE PRACTITIONER

## 2025-02-20 RX ORDER — MICONAZOLE NITRATE 20 MG/G
1 CREAM TOPICAL 2 TIMES DAILY
Qty: 141.7 G | Refills: 0 | Status: SHIPPED | OUTPATIENT
Start: 2025-02-20

## 2025-02-20 RX ORDER — HYDROCODONE BITARTRATE AND ACETAMINOPHEN 7.5; 325 MG/1; MG/1
1 TABLET ORAL EVERY 4 HOURS PRN
Qty: 30 TABLET | Refills: 0 | Status: SHIPPED | OUTPATIENT
Start: 2025-02-20

## 2025-02-20 RX ORDER — MICONAZOLE NITRATE ANTIFUNGAL POWDER 2 G/100G
1 POWDER TOPICAL AS NEEDED
Qty: 71 G | Refills: 2 | Status: SHIPPED | OUTPATIENT
Start: 2025-02-20

## 2025-02-20 RX ORDER — FLUCONAZOLE 150 MG/1
150 TABLET ORAL ONCE
Qty: 5 TABLET | Refills: 2 | Status: SHIPPED | OUTPATIENT
Start: 2025-02-20 | End: 2025-02-20

## 2025-02-20 NOTE — PROGRESS NOTES
"Chief Complaint   Patient presents with    Rash     Symptoms started approximately 2 weeks ago        Subjective   Denisa Michelle is a 64 y.o. female who presents today for the following: rash in groin    HPI   Patient presents with rash in groin x 2 weeks. It has split open and drained blood over the past two days on the left side. She states that the rash is sore especially with clothing rubbing on it.   She has used triamcinolone and it has not helped. She has had a similar rash under her breasts and apron area.     Allergies   Allergen Reactions    Amoxicillin Diarrhea    Clindamycin/Lincomycin Hives and Rash         OBJECTIVE:  Vitals:    02/20/25 1330   BP: 112/58   BP Location: Left arm   Patient Position: Sitting   Cuff Size: Adult   Pulse: 98   Resp: 18   Temp: 98.1 °F (36.7 °C)   TempSrc: Oral   SpO2: 99%   Weight: 65.5 kg (144 lb 6.4 oz)   Height: 158.8 cm (62.5\")     Physical Exam  Skin:     Comments: Skin in left groin area macerated and moist with triamcinolone cream. It is erythematous and oozing a small amount of blood. It is painful to touch.                     ASSESSMENT/ PLAN:    Diagnoses and all orders for this visit:    1. Tinea (Primary)  -     fluconazole (DIFLUCAN) 150 MG tablet; Take 1 tablet by mouth 1 (One) Time for 1 dose.  Dispense: 5 tablet; Refill: 2  -     miconazole (Antifungal) 2 % cream; Apply 1 Application topically to the appropriate area as directed 2 (Two) Times a Day.  Dispense: 141.7 g; Refill: 0  -     miconazole (Antifungal) 2 % powder; Apply 1 Application topically to the appropriate area as directed As Needed for Itching.  Dispense: 71 g; Refill: 2    2. Multiple joint pain  -     HYDROcodone-acetaminophen (Norco) 7.5-325 MG per tablet; Take 1 tablet by mouth Every 4 (Four) Hours As Needed for Moderate Pain.  Dispense: 30 tablet; Refill: 0      Procedures     Management Plan:   Complete medication course. If no improvement, we may have to just move to steroids.   An " After Visit Summary was printed and given to the patient at discharge.    Follow-up: Return if symptoms worsen or fail to improve.         Shelli Da Silva, APRN 2/20/2025 14:32 CST  This note was electronically signed.

## 2025-03-24 ENCOUNTER — OFFICE VISIT (OUTPATIENT)
Dept: FAMILY MEDICINE CLINIC | Facility: CLINIC | Age: 65
End: 2025-03-24
Payer: MEDICARE

## 2025-03-24 VITALS
HEART RATE: 86 BPM | TEMPERATURE: 98.3 F | DIASTOLIC BLOOD PRESSURE: 72 MMHG | BODY MASS INDEX: 27.11 KG/M2 | HEIGHT: 63 IN | WEIGHT: 153 LBS | OXYGEN SATURATION: 97 % | SYSTOLIC BLOOD PRESSURE: 108 MMHG

## 2025-03-24 VITALS
OXYGEN SATURATION: 97 % | TEMPERATURE: 98.3 F | BODY MASS INDEX: 27.11 KG/M2 | HEART RATE: 86 BPM | WEIGHT: 153 LBS | SYSTOLIC BLOOD PRESSURE: 108 MMHG | DIASTOLIC BLOOD PRESSURE: 72 MMHG | HEIGHT: 63 IN

## 2025-03-24 DIAGNOSIS — R07.9 CHEST PAIN, UNSPECIFIED TYPE: ICD-10-CM

## 2025-03-24 DIAGNOSIS — M25.50 MULTIPLE JOINT PAIN: ICD-10-CM

## 2025-03-24 DIAGNOSIS — J30.2 SEASONAL ALLERGIES: ICD-10-CM

## 2025-03-24 DIAGNOSIS — E78.00 ELEVATED LDL CHOLESTEROL LEVEL: ICD-10-CM

## 2025-03-24 DIAGNOSIS — I95.0 IDIOPATHIC HYPOTENSION: ICD-10-CM

## 2025-03-24 DIAGNOSIS — E03.9 ACQUIRED HYPOTHYROIDISM: ICD-10-CM

## 2025-03-24 DIAGNOSIS — F41.9 ANXIETY: ICD-10-CM

## 2025-03-24 DIAGNOSIS — E55.9 VITAMIN D DEFICIENCY: ICD-10-CM

## 2025-03-24 DIAGNOSIS — M51.362 DEGENERATION OF INTERVERTEBRAL DISC OF LUMBAR REGION WITH DISCOGENIC BACK PAIN AND LOWER EXTREMITY PAIN: ICD-10-CM

## 2025-03-24 DIAGNOSIS — J30.2 SEASONAL ALLERGIES: Chronic | ICD-10-CM

## 2025-03-24 DIAGNOSIS — Z00.00 ENCOUNTER FOR SUBSEQUENT ANNUAL WELLNESS VISIT IN MEDICARE PATIENT: Primary | ICD-10-CM

## 2025-03-24 DIAGNOSIS — R07.9 CHEST PAIN, UNSPECIFIED TYPE: Chronic | ICD-10-CM

## 2025-03-24 PROCEDURE — 93000 ELECTROCARDIOGRAM COMPLETE: CPT | Performed by: NURSE PRACTITIONER

## 2025-03-24 PROCEDURE — 1125F AMNT PAIN NOTED PAIN PRSNT: CPT | Performed by: NURSE PRACTITIONER

## 2025-03-24 PROCEDURE — 96160 PT-FOCUSED HLTH RISK ASSMT: CPT | Performed by: NURSE PRACTITIONER

## 2025-03-24 PROCEDURE — 99213 OFFICE O/P EST LOW 20 MIN: CPT | Performed by: NURSE PRACTITIONER

## 2025-03-24 PROCEDURE — 1170F FXNL STATUS ASSESSED: CPT | Performed by: NURSE PRACTITIONER

## 2025-03-24 PROCEDURE — G0439 PPPS, SUBSEQ VISIT: HCPCS | Performed by: NURSE PRACTITIONER

## 2025-03-24 RX ORDER — LORATADINE AND PSEUDOEPHEDRINE SULFATE 10; 240 MG/1; MG/1
1 TABLET, FILM COATED, EXTENDED RELEASE ORAL DAILY
Qty: 15 TABLET | Refills: 0 | Status: SHIPPED | OUTPATIENT
Start: 2025-03-24

## 2025-03-24 NOTE — ASSESSMENT & PLAN NOTE
Orders:    loratadine-pseudoephedrine (Allergy Relief/Nasal Decongest)  MG per 24 hr tablet; Take 1 tablet by mouth Daily.

## 2025-03-24 NOTE — PROGRESS NOTES
Subjective   The ABCs of the Annual Wellness Visit  Medicare Wellness Visit      Denisa Michelle is a 64 y.o. patient who presents for a Medicare Wellness Visit.    The following portions of the patient's history were reviewed and   updated as appropriate: allergies, current medications, past family history, past medical history, past social history, past surgical history, and problem list.    Compared to one year ago, the patient's physical   health is the same.  Compared to one year ago, the patient's mental   health is the same.    Recent Hospitalizations:  She was not admitted to the hospital during the last year.     Current Medical Providers:  Patient Care Team:  Shelli Da Silva APRN as PCP - General (Family Medicine)    Outpatient Medications Prior to Visit   Medication Sig Dispense Refill    ALPRAZolam (XANAX) 1 MG tablet Take 1 tablet by mouth 2 (two) times a day. 2 tabs BID with an additional 2 tabs at night      atorvastatin (LIPITOR) 40 MG tablet Take 1 tablet by mouth Every Night. 90 tablet 1    azelastine (ASTELIN) 0.1 % nasal spray Administer 1 spray into the nostril(s) as directed by provider 2 (Two) Times a Day.      bisacodyl (DULCOLAX) 10 MG suppository Insert 1 suppository into the rectum Daily. 10 suppository 5    busPIRone (BUSPAR) 30 MG tablet Take 1.5 tablets by mouth 2 (Two) Times a Day. Patient takes 45 mg BID      diphenoxylate-atropine (LOMOTIL) 2.5-0.025 MG per tablet TAKE ONE TABLET BY MOUTH FOUR TIMES A DAY AS NEEDED FOR DIARRHEA 40 tablet 2    levothyroxine (SYNTHROID, LEVOTHROID) 50 MCG tablet Take 1 tablet by mouth Daily. 90 tablet 1    Magnesium 500 MG tablet Take 750 mg by mouth every night at bedtime.      melatonin 5 MG tablet tablet Take 8 tablets by mouth Every Night.      miconazole (Antifungal) 2 % cream Apply 1 Application topically to the appropriate area as directed 2 (Two) Times a Day. 141.7 g 0    miconazole (Antifungal) 2 % powder Apply 1 Application topically to the  appropriate area as directed As Needed for Itching. 71 g 2    nitroglycerin (NITROSTAT) 0.4 MG SL tablet Place 1 tablet under the tongue Every 5 (Five) Minutes As Needed for Chest Pain. Take no more than 3 doses in 15 minutes.      omeprazole (priLOSEC) 20 MG capsule Take 1 capsule by mouth 2 (Two) Times a Day. 180 capsule 1    pregabalin (LYRICA) 150 MG capsule Take 1 capsule by mouth Every Evening. 90 capsule 2    traZODone (DESYREL) 50 MG tablet Take 2 tablets by mouth Every Night.      triamcinolone (KENALOG) 0.1 % cream Apply 1 Application topically to the appropriate area as directed 2 (Two) Times a Day. 454 g 3    vitamin D (ERGOCALCIFEROL) 1.25 MG (63059 UT) capsule capsule Take 1 capsule by mouth 1 (One) Time Per Week. 12 capsule 4    loratadine-pseudoephedrine (Allergy Relief/Nasal Decongest)  MG per 24 hr tablet Take 1 tablet by mouth Daily. 15 tablet 0    HYDROcodone-acetaminophen (Norco) 7.5-325 MG per tablet Take 1 tablet by mouth Every 4 (Four) Hours As Needed for Moderate Pain. (Patient not taking: Reported on 3/24/2025) 30 tablet 0    midodrine (PROAMATINE) 5 MG tablet Take 1 tablet by mouth 3 (Three) Times a Day Before Meals. (Patient not taking: Reported on 3/24/2025) 90 tablet 2     No facility-administered medications prior to visit.     No opioid medication identified on active medication list. I have reviewed chart for other potential  high risk medication/s and harmful drug interactions in the elderly.      Aspirin is not on active medication list.  Aspirin use is indicated based on review of current medical condition/s. Pros and cons of this therapy have been discussed with this patient. Benefits of this medication outweigh potential harm.  Patient has been instructed to start taking this medication..    Patient Active Problem List   Diagnosis    Sinus node dysfunction    Chest pain in adult    Anxiety    Mixed hyperlipidemia    Major depressive disorder, recurrent, moderate    GERD  "without esophagitis    Primary insomnia    Acquired hypothyroidism    Left hip pain    Right thigh pain    Seasonal allergies    Multiple joint pain    DDD (degenerative disc disease), lumbar    Overweight (BMI 25.0-29.9)    Pacemaker lead malfunction    Chronic low back pain     Advance Care Planning Advance Directive is not on file.  ACP discussion was declined by the patient. Patient does not have an advance directive, declines further assistance.            Objective   Vitals:    03/24/25 0918   BP: 108/72   Pulse: 86   Temp: 98.3 °F (36.8 °C)   SpO2: 97%   Weight: 69.4 kg (153 lb)   Height: 158.8 cm (62.52\")       Estimated body mass index is 27.52 kg/m² as calculated from the following:    Height as of this encounter: 158.8 cm (62.52\").    Weight as of this encounter: 69.4 kg (153 lb).     BMI is >= 25 and <30. (Overweight) The following options were offered after discussion;: weight loss educational material (shared in after visit summary), exercise counseling/recommendations, and nutrition counseling/recommendations            Does the patient have evidence of cognitive impairment? No       ECG 12 Lead    Date/Time: 3/24/2025 3:40 PM  Performed by: Shelli Da Silva APRN    Authorized by: Shelli Da Silva APRN  Comparison: not compared with previous ECG   Previous ECG: no previous ECG available  Rhythm: sinus rhythm  Rate: normal  Conduction: conduction normal  QRS axis: normal    Clinical impression: normal ECG                                                                                                Health  Risk Assessment    Smoking Status:  Social History     Tobacco Use   Smoking Status Every Day    Current packs/day: 1.00    Average packs/day: 1.1 packs/day for 41.3 years (45.4 ttl pk-yrs)    Types: Cigarettes    Start date: 1/1/1992    Passive exposure: Never   Smokeless Tobacco Current     Alcohol Consumption:  Social History     Substance and Sexual Activity   Alcohol Use Never       Fall Risk " Screen  STEADI Fall Risk Assessment was completed, and patient is at LOW risk for falls.Assessment completed on:3/24/2025    Depression Screening   The PHQ has not been completed during this encounter.     Health Habits and Functional and Cognitive Screening:      3/24/2025    11:13 AM   Functional & Cognitive Status   Do you have difficulty preparing food and eating? No   Do you have difficulty bathing yourself, getting dressed or grooming yourself? No   Do you have difficulty using the toilet? No   Do you have difficulty moving around from place to place? No   Do you have trouble with steps or getting out of a bed or a chair? No   Current Diet Well Balanced Diet   Dental Exam Unknown   Eye Exam Unknown   Exercise (times per week) 0 times per week   Current Exercises Include No Regular Exercise   Do you need help using the phone?  No   Are you deaf or do you have serious difficulty hearing?  No   Do you need help to go to places out of walking distance? No   Do you need help shopping? No   Do you need help preparing meals?  No   Do you need help with housework?  No   Do you need help with laundry? No   Do you need help taking your medications? No   Do you need help managing money? No   Do you ever drive or ride in a car without wearing a seat belt? No   Have you felt unusual stress, anger or loneliness in the last month? No   Who do you live with? Spouse   Have you been bothered in the last four weeks by sexual problems? No   Do you have difficulty concentrating, remembering or making decisions? No           Age-appropriate Screening Schedule:  Refer to the list below for future screening recommendations based on patient's age, sex and/or medical conditions. Orders for these recommended tests are listed in the plan section. The patient has been provided with a written plan.    Health Maintenance List  Health Maintenance   Topic Date Due    LUNG CANCER SCREENING  Never done    PAP SMEAR  Never done    DXA SCAN   11/18/2022    LIPID PANEL  11/28/2024    COVID-19 Vaccine (6 - 2024-25 season) 04/16/2025    BMI FOLLOWUP  12/30/2025    ANNUAL WELLNESS VISIT  03/24/2026    MAMMOGRAM  05/09/2026    COLORECTAL CANCER SCREENING  10/24/2027    TDAP/TD VACCINES (2 - Td or Tdap) 03/14/2033    HEPATITIS C SCREENING  Completed    INFLUENZA VACCINE  Completed    Pneumococcal Vaccine 50+  Completed    ZOSTER VACCINE  Completed    HEMOGLOBIN A1C  Discontinued                                                                                                                                                CMS Preventative Services Quick Reference  Risk Factors Identified During Encounter  Chronic Pain: Natural history and expected course discussed. Questions answered.  Depression/Dysphoria: Current medication is effective, no change recommended  Inactivity/Sedentary: Patient was advised to exercise at least 150 minutes a week per CDC recommendations.  Polypharmacy: Medication List reviewed and Medications are appropriate for patient  Tobacco Use/Dependance Risk (use dotphrase .tobaccocessation for documentation)  Urinary Incontinence: Kegel exercises discussed    The above risks/problems have been discussed with the patient.  Pertinent information has been shared with the patient in the After Visit Summary.  An After Visit Summary and PPPS were made available to the patient.    Follow Up:   Next Medicare Wellness visit to be scheduled in 1 year.         Additional E&M Note during same encounter follows:  Patient has additional, significant, and separately identifiable condition(s)/problem(s) that require work above and beyond the Medicare Wellness Visit     Chief Complaint  Back Pain (UPPER BACK SHOULDER BLADE RIGHT, LAST 2 WEEKS HAS GOTTEN WORSE)    Subjective   HPI  Denisa is also being seen today for additional medical problem/s. She has been having chest pain at night that feels like pressure and is not relieved by nitro. She does not want a  "cardiac referral She would like cardiac enzymes drawn. The pain radiates into the right scapula.                Objective   Vital Signs:  /72   Pulse 86   Temp 98.3 °F (36.8 °C)   Ht 158.8 cm (62.52\")   Wt 69.4 kg (153 lb)   SpO2 97%   BMI 27.52 kg/m²   Physical Exam  Vitals and nursing note reviewed.   Constitutional:       Appearance: Normal appearance.   HENT:      Head: Normocephalic and atraumatic.      Right Ear: Tympanic membrane, ear canal and external ear normal.      Left Ear: Tympanic membrane, ear canal and external ear normal.      Nose: Nose normal.      Mouth/Throat:      Mouth: Mucous membranes are moist.      Pharynx: Oropharynx is clear.   Eyes:      Extraocular Movements: Extraocular movements intact.      Pupils: Pupils are equal, round, and reactive to light.   Cardiovascular:      Rate and Rhythm: Normal rate and regular rhythm.      Pulses: Normal pulses.      Heart sounds: Normal heart sounds.   Pulmonary:      Effort: Pulmonary effort is normal.      Breath sounds: Normal breath sounds.   Abdominal:      General: Abdomen is flat. Bowel sounds are normal.      Palpations: Abdomen is soft.   Musculoskeletal:         General: Normal range of motion.      Cervical back: Normal range of motion and neck supple.      Comments: Multiple joint and muscle tenderness   Skin:     General: Skin is warm and dry.      Capillary Refill: Capillary refill takes less than 2 seconds.   Neurological:      General: No focal deficit present.      Mental Status: She is alert and oriented to person, place, and time.   Psychiatric:         Mood and Affect: Mood normal.         Behavior: Behavior normal.         Thought Content: Thought content normal.         Judgment: Judgment normal.                    Assessment and Plan Additional age appropriate preventative wellness advice topics were discussed during today's preventative wellness exam(some topics already addressed during AWV portion of the note " above):    Physical Activity: Advised cardiovascular activity 150 minutes per week as tolerated. (example brisk walk for 30 minutes, 5 days a week).     Nutrition: Discussed nutrition plan with patient. Information shared in after visit summary. Goal is for a well balanced diet to enhance overall health.     Healthy Weight: Discussed current and goal BMI with patient. Steps to attain this goal discussed. Information shared in after visit summary.     Tobacco Misuse Discussion: Information shared in after visit summary.     Motor Vehicle Safety Discussion:  Wearing Seatbelt While in Motor Vehicle recommendation. Adhering to posted speed limit recommendation.     Injury Prevention Discussion:  Information shared in after visit summary.           Seasonal allergies    Orders:    loratadine-pseudoephedrine (Allergy Relief/Nasal Decongest)  MG per 24 hr tablet; Take 1 tablet by mouth Daily.    Chest pain, unspecified type    Orders:    ECG 12 Lead    CK    Troponin T ([facility])    ECG 12 Lead    Encounter for subsequent annual wellness visit in Medicare patient         Multiple joint pain         Degeneration of intervertebral disc of lumbar region with discogenic back pain and lower extremity pain         Acquired hypothyroidism         Vitamin D deficiency         Idiopathic hypotension         Anxiety                 Follow Up   Return for fasting labs.  Patient was given instructions and counseling regarding her condition or for health maintenance advice. Please see specific information pulled into the AVS if appropriate.

## 2025-03-24 NOTE — PROGRESS NOTES
Subjective   The ABCs of the Annual Wellness Visit  Medicare Wellness Visit      Denisa Michelle is a 64 y.o. patient who presents for a Medicare Wellness Visit.    The following portions of the patient's history were reviewed and   updated as appropriate: allergies, current medications, past family history, past medical history, past social history, past surgical history, and problem list.    Compared to one year ago, the patient's physical   health is the same.  Compared to one year ago, the patient's mental   health is the same.    Recent Hospitalizations:  She was not admitted to the hospital during the last year.     Current Medical Providers:  Patient Care Team:  Shelli Da Silva APRN as PCP - General (Family Medicine)    Outpatient Medications Prior to Visit   Medication Sig Dispense Refill    ALPRAZolam (XANAX) 1 MG tablet Take 1 tablet by mouth 2 (two) times a day. 2 tabs BID with an additional 2 tabs at night      atorvastatin (LIPITOR) 40 MG tablet Take 1 tablet by mouth Every Night. 90 tablet 1    azelastine (ASTELIN) 0.1 % nasal spray Administer 1 spray into the nostril(s) as directed by provider 2 (Two) Times a Day.      bisacodyl (DULCOLAX) 10 MG suppository Insert 1 suppository into the rectum Daily. 10 suppository 5    busPIRone (BUSPAR) 30 MG tablet Take 1.5 tablets by mouth 2 (Two) Times a Day. Patient takes 45 mg BID      diphenoxylate-atropine (LOMOTIL) 2.5-0.025 MG per tablet TAKE ONE TABLET BY MOUTH FOUR TIMES A DAY AS NEEDED FOR DIARRHEA 40 tablet 2    levothyroxine (SYNTHROID, LEVOTHROID) 50 MCG tablet Take 1 tablet by mouth Daily. 90 tablet 1    loratadine-pseudoephedrine (Allergy Relief/Nasal Decongest)  MG per 24 hr tablet Take 1 tablet by mouth Daily. 15 tablet 0    Magnesium 500 MG tablet Take 750 mg by mouth every night at bedtime.      melatonin 5 MG tablet tablet Take 8 tablets by mouth Every Night.      miconazole (Antifungal) 2 % cream Apply 1 Application topically to the  appropriate area as directed 2 (Two) Times a Day. 141.7 g 0    miconazole (Antifungal) 2 % powder Apply 1 Application topically to the appropriate area as directed As Needed for Itching. 71 g 2    nitroglycerin (NITROSTAT) 0.4 MG SL tablet Place 1 tablet under the tongue Every 5 (Five) Minutes As Needed for Chest Pain. Take no more than 3 doses in 15 minutes.      omeprazole (priLOSEC) 20 MG capsule Take 1 capsule by mouth 2 (Two) Times a Day. 180 capsule 1    pregabalin (LYRICA) 150 MG capsule Take 1 capsule by mouth Every Evening. 90 capsule 2    traZODone (DESYREL) 50 MG tablet Take 2 tablets by mouth Every Night.      triamcinolone (KENALOG) 0.1 % cream Apply 1 Application topically to the appropriate area as directed 2 (Two) Times a Day. 454 g 3    vitamin D (ERGOCALCIFEROL) 1.25 MG (17925 UT) capsule capsule Take 1 capsule by mouth 1 (One) Time Per Week. 12 capsule 4     No facility-administered medications prior to visit.     No opioid medication identified on active medication list. I have reviewed chart for other potential  high risk medication/s and harmful drug interactions in the elderly.      Aspirin is not on active medication list.  Aspirin use is indicated based on review of current medical condition/s. Pros and cons of this therapy have been discussed with this patient. Benefits of this medication outweigh potential harm.  Patient has been instructed to start taking this medication..    Patient Active Problem List   Diagnosis    Sinus node dysfunction    Chest pain in adult    Anxiety    Mixed hyperlipidemia    Major depressive disorder, recurrent, moderate    GERD without esophagitis    Primary insomnia    Acquired hypothyroidism    Left hip pain    Right thigh pain    Seasonal allergies    Multiple joint pain    DDD (degenerative disc disease), lumbar    Overweight (BMI 25.0-29.9)    Pacemaker lead malfunction    Chronic low back pain     Advance Care Planning Advance Directive is not on file.   "ACP discussion was declined by the patient. Patient does not have an advance directive, declines further assistance.            Objective   Vitals:    03/24/25 1307   BP: 108/72   Pulse: 86   Temp: 98.3 °F (36.8 °C)   SpO2: 97%   Weight: 69.4 kg (153 lb)   Height: 158.8 cm (62.52\")       Estimated body mass index is 27.52 kg/m² as calculated from the following:    Height as of this encounter: 158.8 cm (62.52\").    Weight as of this encounter: 69.4 kg (153 lb).                Does the patient have evidence of cognitive impairment? No                                                                                                Health  Risk Assessment    Smoking Status:  Social History     Tobacco Use   Smoking Status Every Day    Current packs/day: 1.00    Average packs/day: 1.1 packs/day for 41.3 years (45.4 ttl pk-yrs)    Types: Cigarettes    Start date: 1/1/1992    Passive exposure: Never   Smokeless Tobacco Current     Alcohol Consumption:  Social History     Substance and Sexual Activity   Alcohol Use Never       Fall Risk Screen  STEADI Fall Risk Assessment was completed, and patient is at LOW risk for falls.Assessment completed on:3/24/2025    Depression Screening   Little interest or pleasure in doing things? Not at all   Feeling down, depressed, or hopeless? Not at all   PHQ-2 Total Score 0      Health Habits and Functional and Cognitive Screening:      3/24/2025    11:13 AM   Functional & Cognitive Status   Do you have difficulty preparing food and eating? No   Do you have difficulty bathing yourself, getting dressed or grooming yourself? No   Do you have difficulty using the toilet? No   Do you have difficulty moving around from place to place? No   Do you have trouble with steps or getting out of a bed or a chair? No   Current Diet Well Balanced Diet   Dental Exam Unknown   Eye Exam Unknown   Exercise (times per week) 0 times per week   Current Exercises Include No Regular Exercise   Do you need help " using the phone?  No   Are you deaf or do you have serious difficulty hearing?  No   Do you need help to go to places out of walking distance? No   Do you need help shopping? No   Do you need help preparing meals?  No   Do you need help with housework?  No   Do you need help with laundry? No   Do you need help taking your medications? No   Do you need help managing money? No   Do you ever drive or ride in a car without wearing a seat belt? No   Have you felt unusual stress, anger or loneliness in the last month? No   Who do you live with? Spouse   Have you been bothered in the last four weeks by sexual problems? No   Do you have difficulty concentrating, remembering or making decisions? No           Age-appropriate Screening Schedule:  Refer to the list below for future screening recommendations based on patient's age, sex and/or medical conditions. Orders for these recommended tests are listed in the plan section. The patient has been provided with a written plan.    Health Maintenance List  Health Maintenance   Topic Date Due    LUNG CANCER SCREENING  Never done    PAP SMEAR  Never done    DXA SCAN  11/18/2022    LIPID PANEL  11/28/2024    COVID-19 Vaccine (6 - 2024-25 season) 04/16/2025    BMI FOLLOWUP  12/30/2025    ANNUAL WELLNESS VISIT  03/24/2026    MAMMOGRAM  05/09/2026    COLORECTAL CANCER SCREENING  10/24/2027    TDAP/TD VACCINES (2 - Td or Tdap) 03/14/2033    HEPATITIS C SCREENING  Completed    INFLUENZA VACCINE  Completed    Pneumococcal Vaccine 50+  Completed    ZOSTER VACCINE  Completed    HEMOGLOBIN A1C  Discontinued                                                                                                                                                CMS Preventative Services Quick Reference  Risk Factors Identified During Encounter  Chronic Pain: Natural history and expected course discussed. Questions answered.  Depression/Dysphoria: Current medication is effective, no change  "recommended  Fall Risk-High or Moderate: Discussed Fall Prevention in the home  Inactivity/Sedentary: Patient was advised to exercise at least 150 minutes a week per CDC recommendations.  Polypharmacy: Medication List reviewed and Medications are appropriate for patient  Tobacco Use/Dependance Risk (use dotphrase .tobaccocessation for documentation)  Dental Screening Recommended  Vision Screening Recommended    The above risks/problems have been discussed with the patient.  Pertinent information has been shared with the patient in the After Visit Summary.  An After Visit Summary and PPPS were made available to the patient.    Follow Up:   Next Medicare Wellness visit to be scheduled in 1 year.         Additional E&M Note during same encounter follows:  Patient has additional, significant, and separately identifiable condition(s)/problem(s) that require work above and beyond the Medicare Wellness Visit     Chief Complaint  Medicare Wellness-subsequent    Subjective   HPI  Denisa is also being seen today for additional medical problem/s.  Over the past 2 months, Denisa has been having what she thinks is cardiac chest pain. She does not want to be referred to a cardiologist. She is requesting cardiac markers and EKG be done. She does take nitro but it does not seem to help. She is having pain almost every night.               Objective   Vital Signs:  /72   Pulse 86   Temp 98.3 °F (36.8 °C)   Ht 158.8 cm (62.52\")   Wt 69.4 kg (153 lb)   SpO2 97%   BMI 27.52 kg/m²   Physical Exam  Vitals and nursing note reviewed.   Constitutional:       Appearance: Normal appearance.   HENT:      Head: Normocephalic and atraumatic.      Right Ear: Tympanic membrane, ear canal and external ear normal.      Left Ear: Tympanic membrane, ear canal and external ear normal.      Nose: Nose normal.      Mouth/Throat:      Mouth: Mucous membranes are moist.      Pharynx: Oropharynx is clear.   Eyes:      Extraocular Movements: " Extraocular movements intact.      Pupils: Pupils are equal, round, and reactive to light.   Cardiovascular:      Rate and Rhythm: Normal rate and regular rhythm.      Pulses: Normal pulses.      Heart sounds: Normal heart sounds.      Comments: No chest pain currently  Pulmonary:      Effort: Pulmonary effort is normal.      Breath sounds: Normal breath sounds.   Abdominal:      General: Abdomen is flat. Bowel sounds are normal.      Palpations: Abdomen is soft.   Musculoskeletal:         General: Normal range of motion.      Cervical back: Normal range of motion and neck supple.      Comments: Muscle and joint tenderness full body.   Skin:     General: Skin is warm and dry.      Capillary Refill: Capillary refill takes less than 2 seconds.   Neurological:      General: No focal deficit present.      Mental Status: She is alert and oriented to person, place, and time.   Psychiatric:         Mood and Affect: Mood normal.         Behavior: Behavior normal.         Thought Content: Thought content normal.         Judgment: Judgment normal.                    Assessment and Plan Additional age appropriate preventative wellness advice topics were discussed during today's preventative wellness exam(some topics already addressed during AWV portion of the note above):    Physical Activity: Advised cardiovascular activity 150 minutes per week as tolerated. (example brisk walk for 30 minutes, 5 days a week).     Nutrition: Discussed nutrition plan with patient. Information shared in after visit summary. Goal is for a well balanced diet to enhance overall health.     Healthy Weight: Discussed current and goal BMI with patient. Steps to attain this goal discussed. Information shared in after visit summary.     Tobacco Misuse Discussion: Information shared in after visit summary.     Motor Vehicle Safety Discussion:  Wearing Seatbelt While in Motor Vehicle recommendation. Adhering to posted speed limit recommendation.      Injury Prevention Discussion:  Information shared in after visit summary.           Chest pain, unspecified type    Orders:    ECG 12 Lead    Troponin T ([facility])    CK    Encounter for subsequent annual wellness visit in Medicare patient         Elevated LDL cholesterol level         Multiple joint pain         Degeneration of intervertebral disc of lumbar region with discogenic back pain and lower extremity pain         Acquired hypothyroidism         Vitamin D deficiency         Idiopathic hypotension         Anxiety         Seasonal allergies                 Follow Up   Return for fasting labs.  Patient was given instructions and counseling regarding her condition or for health maintenance advice. Please see specific information pulled into the AVS if appropriate.

## 2025-03-25 LAB
CK SERPL-CCNC: 78 U/L (ref 32–182)
TROPONIN T SERPL HS-MCNC: 11 NG/L (ref 0–14)

## 2025-04-03 DIAGNOSIS — E78.2 MIXED HYPERLIPIDEMIA: ICD-10-CM

## 2025-04-03 RX ORDER — ATORVASTATIN CALCIUM 40 MG/1
40 TABLET, FILM COATED ORAL NIGHTLY
Qty: 90 TABLET | Refills: 1 | Status: SHIPPED | OUTPATIENT
Start: 2025-04-03

## 2025-04-14 ENCOUNTER — OFFICE VISIT (OUTPATIENT)
Dept: FAMILY MEDICINE CLINIC | Facility: CLINIC | Age: 65
End: 2025-04-14
Payer: MEDICARE

## 2025-04-14 VITALS
SYSTOLIC BLOOD PRESSURE: 100 MMHG | BODY MASS INDEX: 26.44 KG/M2 | HEIGHT: 63 IN | WEIGHT: 149.2 LBS | RESPIRATION RATE: 17 BRPM | OXYGEN SATURATION: 98 % | TEMPERATURE: 97.9 F | DIASTOLIC BLOOD PRESSURE: 60 MMHG | HEART RATE: 81 BPM

## 2025-04-14 DIAGNOSIS — J30.2 SEASONAL ALLERGIES: Chronic | ICD-10-CM

## 2025-04-14 DIAGNOSIS — M62.838 MUSCLE SPASM OF SHOULDER REGION: Primary | ICD-10-CM

## 2025-04-14 PROCEDURE — 1160F RVW MEDS BY RX/DR IN RCRD: CPT | Performed by: NURSE PRACTITIONER

## 2025-04-14 PROCEDURE — 99213 OFFICE O/P EST LOW 20 MIN: CPT | Performed by: NURSE PRACTITIONER

## 2025-04-14 PROCEDURE — 1125F AMNT PAIN NOTED PAIN PRSNT: CPT | Performed by: NURSE PRACTITIONER

## 2025-04-14 PROCEDURE — 1159F MED LIST DOCD IN RCRD: CPT | Performed by: NURSE PRACTITIONER

## 2025-04-14 RX ORDER — LORATADINE AND PSEUDOEPHEDRINE SULFATE 10; 240 MG/1; MG/1
1 TABLET, FILM COATED, EXTENDED RELEASE ORAL DAILY
Qty: 30 TABLET | Refills: 3 | Status: SHIPPED | OUTPATIENT
Start: 2025-04-14

## 2025-04-14 RX ORDER — METAXALONE 800 MG/1
800 TABLET ORAL 3 TIMES DAILY PRN
Qty: 270 TABLET | Refills: 3 | Status: SHIPPED | OUTPATIENT
Start: 2025-04-14

## 2025-04-14 NOTE — PROGRESS NOTES
"Chief Complaint   Patient presents with    Back Pain    Med Refill        Subjective   Denisa Michelle is a 64 y.o. female who presents today for the following: low back pain with shoulder pain.    HPI   Patient had an injection in the past month at pain management. Since the weather has been nice, she has started doing more around the house and is very sore in her shoulders. She feels that the back injection worked some but not as well as she expected.  She thinks that skelaxin would be a good supplement in between injections.  She also needs a refill on her allergy medication.    Allergies   Allergen Reactions    Amoxicillin Diarrhea    Clindamycin/Lincomycin Hives and Rash         OBJECTIVE:  Vitals:    04/14/25 1520   BP: 100/60   BP Location: Left arm   Patient Position: Sitting   Cuff Size: Adult   Pulse: 81   Resp: 17   Temp: 97.9 °F (36.6 °C)   TempSrc: Oral   SpO2: 98%   Weight: 67.7 kg (149 lb 3.2 oz)   Height: 158.8 cm (62.52\")     Physical Exam  Musculoskeletal:      Right shoulder: Tenderness present. Decreased range of motion.      Left shoulder: Tenderness present. Decreased range of motion.      Lumbar back: Tenderness present. Normal range of motion.                    ASSESSMENT/ PLAN:    Diagnoses and all orders for this visit:    1. Muscle spasm of shoulder region (Primary)  -     metaxalone (Skelaxin) 800 MG tablet; Take 1 tablet by mouth 3 (Three) Times a Day As Needed for Muscle Spasms.  Dispense: 270 tablet; Refill: 3    2. Seasonal allergies  -     loratadine-pseudoephedrine (Allergy Relief/Nasal Decongest)  MG per 24 hr tablet; Take 1 tablet by mouth Daily.  Dispense: 30 tablet; Refill: 3      Procedures     Management Plan:   Patient will request a small amount of pain medication to tide her over between injections.   An After Visit Summary was printed and given to the patient at discharge.    Follow-up: Return if symptoms worsen or fail to improve.         NEW Trivedi " 4/14/2025 16:17 CDT  This note was electronically signed.

## 2025-04-30 ENCOUNTER — TELEPHONE (OUTPATIENT)
Dept: FAMILY MEDICINE CLINIC | Facility: CLINIC | Age: 65
End: 2025-04-30

## 2025-04-30 ENCOUNTER — OFFICE VISIT (OUTPATIENT)
Dept: FAMILY MEDICINE CLINIC | Facility: CLINIC | Age: 65
End: 2025-04-30
Payer: MEDICARE

## 2025-04-30 VITALS
RESPIRATION RATE: 18 BRPM | HEIGHT: 63 IN | BODY MASS INDEX: 25.8 KG/M2 | SYSTOLIC BLOOD PRESSURE: 97 MMHG | TEMPERATURE: 97.5 F | DIASTOLIC BLOOD PRESSURE: 65 MMHG | WEIGHT: 145.6 LBS | OXYGEN SATURATION: 96 % | HEART RATE: 67 BPM

## 2025-04-30 DIAGNOSIS — M25.50 MULTIPLE JOINT PAIN: ICD-10-CM

## 2025-04-30 DIAGNOSIS — E03.9 ACQUIRED HYPOTHYROIDISM: ICD-10-CM

## 2025-04-30 DIAGNOSIS — E66.3 OVERWEIGHT WITH BODY MASS INDEX (BMI) OF 26 TO 26.9 IN ADULT: ICD-10-CM

## 2025-04-30 DIAGNOSIS — Z78.0 POST-MENOPAUSAL: ICD-10-CM

## 2025-04-30 DIAGNOSIS — M51.362 DEGENERATION OF INTERVERTEBRAL DISC OF LUMBAR REGION WITH DISCOGENIC BACK PAIN AND LOWER EXTREMITY PAIN: ICD-10-CM

## 2025-04-30 DIAGNOSIS — Z00.00 ENCOUNTER FOR SUBSEQUENT ANNUAL WELLNESS VISIT IN MEDICARE PATIENT: Primary | ICD-10-CM

## 2025-04-30 DIAGNOSIS — M85.89 OSTEOPENIA OF MULTIPLE SITES: ICD-10-CM

## 2025-04-30 DIAGNOSIS — Z12.31 ENCOUNTER FOR SCREENING MAMMOGRAM FOR MALIGNANT NEOPLASM OF BREAST: ICD-10-CM

## 2025-04-30 RX ORDER — HYDROCODONE BITARTRATE AND ACETAMINOPHEN 7.5; 325 MG/1; MG/1
1 TABLET ORAL EVERY 6 HOURS PRN
Qty: 30 TABLET | Refills: 0 | Status: SHIPPED | OUTPATIENT
Start: 2025-04-30

## 2025-04-30 NOTE — ASSESSMENT & PLAN NOTE
Orders:    HYDROcodone-acetaminophen (NORCO) 7.5-325 MG per tablet; Take 1 tablet by mouth Every 6 (Six) Hours As Needed for Moderate Pain.

## 2025-04-30 NOTE — TELEPHONE ENCOUNTER
Caller: Denisa Michelle    Relationship: Self    Best call back number: [phone]     What orders are you requesting (i.e. lab or imaging): MAMMOGRAM AND DEXA    In what timeframe would the patient need to come in: SCHEDULED FOR 05/13/25 AT 10:00 TO HAVE BOTH OF THESE DONE AT [facility]    Where will you receive your lab/imaging services: [facility]    Additional notes: NEED TO HAVE BOTH OF THESE REFERRALS/ORDERS FAXED OVER TO [facility]

## 2025-04-30 NOTE — PROGRESS NOTES
Subjective   The ABCs of the Annual Wellness Visit  Medicare Wellness Visit      Denisa Michelle is a 64 y.o. patient who presents for a Medicare Wellness Visit.    The following portions of the patient's history were reviewed and   updated as appropriate: allergies, current medications, past family history, past medical history, past social history, past surgical history, and problem list.    Compared to one year ago, the patient's physical   health is the same.  Compared to one year ago, the patient's mental   health is the same.    Recent Hospitalizations:  She was not admitted to the hospital during the last year.     Current Medical Providers:  Patient Care Team:  Shelli Da Silva APRN as PCP - General (Family Medicine)    Outpatient Medications Prior to Visit   Medication Sig Dispense Refill    ALPRAZolam (XANAX) 1 MG tablet Take 1 tablet by mouth 2 (two) times a day. 2 tabs BID with an additional 2 tabs at night      atorvastatin (LIPITOR) 40 MG tablet TAKE ONE TABLET BY MOUTH EVERY EVENING AT BEDTIME 90 tablet 1    azelastine (ASTELIN) 0.1 % nasal spray Administer 1 spray into the nostril(s) as directed by provider 2 (Two) Times a Day.      bisacodyl (DULCOLAX) 10 MG suppository Insert 1 suppository into the rectum Daily. 10 suppository 5    busPIRone (BUSPAR) 30 MG tablet Take 1.5 tablets by mouth 2 (Two) Times a Day. Patient takes 45 mg BID      diphenoxylate-atropine (LOMOTIL) 2.5-0.025 MG per tablet TAKE ONE TABLET BY MOUTH FOUR TIMES A DAY AS NEEDED FOR DIARRHEA 40 tablet 2    levothyroxine (SYNTHROID, LEVOTHROID) 50 MCG tablet Take 1 tablet by mouth Daily. 90 tablet 1    loratadine-pseudoephedrine (Allergy Relief/Nasal Decongest)  MG per 24 hr tablet Take 1 tablet by mouth Daily. 30 tablet 3    Magnesium 500 MG tablet Take 750 mg by mouth every night at bedtime.      melatonin 5 MG tablet tablet Take 8 tablets by mouth Every Night.      miconazole (Antifungal) 2 % cream Apply 1 Application  topically to the appropriate area as directed 2 (Two) Times a Day. 141.7 g 0    miconazole (Antifungal) 2 % powder Apply 1 Application topically to the appropriate area as directed As Needed for Itching. 71 g 2    nitroglycerin (NITROSTAT) 0.4 MG SL tablet Place 1 tablet under the tongue Every 5 (Five) Minutes As Needed for Chest Pain. Take no more than 3 doses in 15 minutes.      omeprazole (priLOSEC) 20 MG capsule Take 1 capsule by mouth 2 (Two) Times a Day. 180 capsule 1    pregabalin (LYRICA) 150 MG capsule Take 1 capsule by mouth Every Evening. 90 capsule 2    traZODone (DESYREL) 50 MG tablet Take 2 tablets by mouth Every Night.      triamcinolone (KENALOG) 0.1 % cream Apply 1 Application topically to the appropriate area as directed 2 (Two) Times a Day. 454 g 3    vitamin D (ERGOCALCIFEROL) 1.25 MG (85258 UT) capsule capsule Take 1 capsule by mouth 1 (One) Time Per Week. 12 capsule 4    metaxalone (Skelaxin) 800 MG tablet Take 1 tablet by mouth 3 (Three) Times a Day As Needed for Muscle Spasms. (Patient not taking: Reported on 4/30/2025) 270 tablet 3     No facility-administered medications prior to visit.     Opioid medication/s are on active medication list.  and I have evaluated her active treatment plan and pain score trends (see table).  Vitals:    04/30/25 1312   PainSc: 3    PainLoc: Back     I have reviewed the chart for potential of high risk medication and harmful drug interactions in the elderly.        Aspirin is not on active medication list.  Aspirin use is indicated based on review of current medical condition/s. Pros and cons of this therapy have been discussed with this patient. Benefits of this medication outweigh potential harm.  Patient has been instructed to start taking this medication..    Patient Active Problem List   Diagnosis    Sinus node dysfunction    Chest pain in adult    Anxiety    Mixed hyperlipidemia    Major depressive disorder, recurrent, moderate    GERD without esophagitis  "   Primary insomnia    Acquired hypothyroidism    Left hip pain    Right thigh pain    Seasonal allergies    Multiple joint pain    DDD (degenerative disc disease), lumbar    Overweight (BMI 25.0-29.9)    Pacemaker lead malfunction    Chronic low back pain     Advance Care Planning Advance Directive is not on file.  ACP discussion was declined by the patient. Patient does not have an advance directive, declines further assistance.            Objective   Vitals:    04/30/25 1312   BP: 97/65   BP Location: Left arm   Patient Position: Sitting   Cuff Size: Adult   Pulse: 67   Resp: 18   Temp: 97.5 °F (36.4 °C)   TempSrc: Temporal   SpO2: 96%   Weight: 66 kg (145 lb 9.6 oz)   Height: 158.8 cm (62.52\")   PainSc: 3    PainLoc: Back       Estimated body mass index is 26.19 kg/m² as calculated from the following:    Height as of this encounter: 158.8 cm (62.52\").    Weight as of this encounter: 66 kg (145 lb 9.6 oz).    BMI is >= 25 and <30. (Overweight) The following options were offered after discussion;: weight loss educational material (shared in after visit summary), exercise counseling/recommendations, and nutrition counseling/recommendations             Does the patient have evidence of cognitive impairment? No  Lab Results   Component Value Date    CHLPL 130 04/29/2025    TRIG 104 04/29/2025    HDL 59 04/29/2025    LDL 52 04/29/2025    VLDL 19 04/29/2025                                                                                                Health  Risk Assessment    Smoking Status:  Social History     Tobacco Use   Smoking Status Every Day    Current packs/day: 1.00    Average packs/day: 1.1 packs/day for 41.4 years (45.5 ttl pk-yrs)    Types: Cigarettes    Start date: 1/1/1992    Passive exposure: Never   Smokeless Tobacco Current     Alcohol Consumption:  Social History     Substance and Sexual Activity   Alcohol Use Never       Fall Risk Screen  STEADI Fall Risk Assessment was completed, and patient is at " LOW risk for falls.Assessment completed on:2025    Depression Screening   Little interest or pleasure in doing things? Not at all   Feeling down, depressed, or hopeless? Not at all   PHQ-2 Total Score 0      Health Habits and Functional and Cognitive Screenin/30/2025     1:15 PM   Functional & Cognitive Status   Do you have difficulty preparing food and eating? No   Do you have difficulty bathing yourself, getting dressed or grooming yourself? No   Do you have difficulty using the toilet? No   Do you have difficulty moving around from place to place? No   Do you have trouble with steps or getting out of a bed or a chair? No   Dental Exam Up to date   Eye Exam Up to date   Exercise (times per week) 5 times per week   Current Exercises Include Walking   Do you need help using the phone?  No   Are you deaf or do you have serious difficulty hearing?  No   Do you need help to go to places out of walking distance? No   Do you need help shopping? No   Do you need help preparing meals?  No   Do you need help with housework?  No   Do you need help with laundry? No   Do you need help taking your medications? No   Do you need help managing money? No   Do you ever drive or ride in a car without wearing a seat belt? No   Have you felt unusual stress, anger or loneliness in the last month? No   Who do you live with? Alone   If you need help, do you have trouble finding someone available to you? No   Have you been bothered in the last four weeks by sexual problems? No   Do you have difficulty concentrating, remembering or making decisions? No           Age-appropriate Screening Schedule:  Refer to the list below for future screening recommendations based on patient's age, sex and/or medical conditions. Orders for these recommended tests are listed in the plan section. The patient has been provided with a written plan.    Health Maintenance List  Health Maintenance   Topic Date Due    LUNG CANCER SCREENING  Never done     DXA SCAN  11/18/2022    COVID-19 Vaccine (6 - 2024-25 season) 04/16/2025    INFLUENZA VACCINE  07/01/2025    ANNUAL WELLNESS VISIT  03/24/2026    LIPID PANEL  04/29/2026    MAMMOGRAM  05/09/2026    COLORECTAL CANCER SCREENING  10/24/2027    TDAP/TD VACCINES (2 - Td or Tdap) 03/14/2033    HEPATITIS C SCREENING  Completed    Pneumococcal Vaccine 50+  Completed    ZOSTER VACCINE  Completed    HEMOGLOBIN A1C  Discontinued                                                                                                                                                CMS Preventative Services Quick Reference  Risk Factors Identified During Encounter  Chronic Pain: Natural history and expected course discussed. Questions answered.  Depression/Dysphoria: Current medication is effective, no change recommended  Inactivity/Sedentary: Patient was advised to exercise at least 150 minutes a week per CDC recommendations.    The above risks/problems have been discussed with the patient.  Pertinent information has been shared with the patient in the After Visit Summary.  An After Visit Summary and PPPS were made available to the patient.    Follow Up:   Next Medicare Wellness visit to be scheduled in 1 year.         Additional E&M Note during same encounter follows:  Patient has additional, significant, and separately identifiable condition(s)/problem(s) that require work above and beyond the Medicare Wellness Visit     Chief Complaint  Medicare Wellness-subsequent (Pt presents for her Medicare Wellness visit. )    Subjective   HPI  Denisa is also being seen today for an annual adult preventative physical exam.   She also complains of multiple joint pain. She goes to pain management and states that her epidural does not last very long.   She is more active now that it is warmer and would               Objective   Vital Signs:  BP 97/65 (BP Location: Left arm, Patient Position: Sitting, Cuff Size: Adult)   Pulse 67   Temp 97.5 °F  "(36.4 °C) (Temporal)   Resp 18   Ht 158.8 cm (62.52\")   Wt 66 kg (145 lb 9.6 oz)   SpO2 96%   BMI 26.19 kg/m²   Physical Exam  Vitals and nursing note reviewed.   Constitutional:       Appearance: Normal appearance.   HENT:      Head: Normocephalic and atraumatic.      Right Ear: Tympanic membrane, ear canal and external ear normal.      Left Ear: Tympanic membrane, ear canal and external ear normal.      Nose: Nose normal.      Mouth/Throat:      Mouth: Mucous membranes are moist.      Pharynx: Oropharynx is clear.   Eyes:      Extraocular Movements: Extraocular movements intact.      Pupils: Pupils are equal, round, and reactive to light.   Cardiovascular:      Rate and Rhythm: Normal rate and regular rhythm.      Pulses: Normal pulses.      Heart sounds: Normal heart sounds.   Pulmonary:      Effort: Pulmonary effort is normal.      Breath sounds: Normal breath sounds.   Abdominal:      General: Abdomen is flat. Bowel sounds are normal.      Palpations: Abdomen is soft.   Musculoskeletal:         General: Normal range of motion.      Cervical back: Normal range of motion and neck supple.      Lumbar back: Tenderness present. Decreased range of motion.      Comments: Multiple joints painful from fibromyalgia and DDD.    Skin:     General: Skin is warm and dry.      Capillary Refill: Capillary refill takes less than 2 seconds.   Neurological:      General: No focal deficit present.      Mental Status: She is alert and oriented to person, place, and time.   Psychiatric:         Mood and Affect: Mood normal.         Behavior: Behavior normal.         Thought Content: Thought content normal.         Judgment: Judgment normal.             CMP          8/23/2024    08:15 4/29/2025    07:18   CMP   Glucose 88  90    BUN 10  8    Creatinine 1.10  1.20    EGFR 55  50    Sodium 142  141    Potassium 4.2  4.5    Chloride 103  104    Calcium 9.5  9.4    Total Protein 5.5  5.6    Albumin 3.9  4.1    Globulin 1.6  1.5  "   Total Bilirubin 1.0  0.6    Alkaline Phosphatase 104  86    AST (SGOT) 19  20    ALT (SGPT) 25  20    BUN/Creatinine Ratio 9  7      CBC w/diff          8/23/2024    08:15 4/29/2025    07:18   CBC w/Diff   WBC 6.6  5.6    RBC 4.98  4.95    Hemoglobin 15.1  15.7    Hematocrit 47.8  47.5    MCV 96  96    MCH 30.3  31.7    MCHC 31.6  33.1    RDW 12.4  12.2    Platelets 239  252    Neutrophil Rel % 58  48    Lymphocyte Rel % 31  42    Monocyte Rel % 8  7    Eosinophil Rel % 2  2    Basophil Rel % 1  1      Lipid Panel          4/29/2025    07:18   Lipid Panel   Total Cholesterol 130    Triglycerides 104    HDL Cholesterol 59    VLDL Cholesterol 19    LDL Cholesterol  52    LDL/HDL Ratio 0.9      TSH          4/29/2025    07:18   TSH   TSH 1.700    Blood work reviewed and discussed with patient         Assessment and Plan Additional age appropriate preventative wellness advice topics were discussed during today's preventative wellness exam(some topics already addressed during AWV portion of the note above):    Physical Activity: Advised cardiovascular activity 150 minutes per week as tolerated. (example brisk walk for 30 minutes, 5 days a week).     Nutrition: Discussed nutrition plan with patient. Information shared in after visit summary. Goal is for a well balanced diet to enhance overall health.     Motor Vehicle Safety Discussion:  Wearing Seatbelt While in Motor Vehicle recommendation. Adhering to posted speed limit recommendation.     Injury Prevention Discussion:  Information shared in after visit summary.           Encounter for screening mammogram for malignant neoplasm of breast    Orders:    Mammo Screening Digital Tomosynthesis Bilateral With CAD; Future    Post-menopausal    Orders:    DEXA Bone Density Axial; Future    Acquired hypothyroidism         Multiple joint pain    Orders:    HYDROcodone-acetaminophen (NORCO) 7.5-325 MG per tablet; Take 1 tablet by mouth Every 6 (Six) Hours As Needed for Moderate  Pain.    Overweight with body mass index (BMI) of 26 to 26.9 in adult  Patient's (Body mass index is 26.19 kg/m².) indicates that they are overweight with health conditions that include dyslipidemias . Weight is improving with treatment. BMI is above average; BMI management plan is completed. We discussed low calorie, low carb based diet program, portion control, and increasing exercise.          Osteopenia of multiple sites    Orders:    DEXA Bone Density Axial; Future    Degeneration of intervertebral disc of lumbar region with discogenic back pain and lower extremity pain    Orders:    HYDROcodone-acetaminophen (NORCO) 7.5-325 MG per tablet; Take 1 tablet by mouth Every 6 (Six) Hours As Needed for Moderate Pain.    Encounter for subsequent annual wellness visit in Medicare patient                 Return in about 3 months (around 7/30/2025) for Recheck with compliance.  Patient was given instructions and counseling regarding her condition or for health maintenance advice. Please see specific information pulled into the AVS if appropriate.

## 2025-05-13 DIAGNOSIS — Z87.891 PERSONAL HISTORY OF NICOTINE DEPENDENCE: Primary | ICD-10-CM

## 2025-05-13 DIAGNOSIS — Z12.31 ENCOUNTER FOR SCREENING MAMMOGRAM FOR MALIGNANT NEOPLASM OF BREAST: ICD-10-CM

## 2025-05-15 ENCOUNTER — TELEPHONE (OUTPATIENT)
Dept: FAMILY MEDICINE CLINIC | Facility: CLINIC | Age: 65
End: 2025-05-15
Payer: MEDICARE

## 2025-05-15 ENCOUNTER — PRIOR AUTHORIZATION (OUTPATIENT)
Dept: FAMILY MEDICINE CLINIC | Facility: CLINIC | Age: 65
End: 2025-05-15

## 2025-05-15 RX ORDER — BACLOFEN 10 MG/1
5 TABLET ORAL 3 TIMES DAILY PRN
Qty: 30 TABLET | Refills: 0 | Status: SHIPPED | OUTPATIENT
Start: 2025-05-15

## 2025-05-15 NOTE — TELEPHONE ENCOUNTER
PA completed for Metaxalone 800mg on CMM on 04/15/25.  PA denied on 04/15/25.  Appeal initiated and faxed on 05/02/25.  Status check on appeal on 05/15/25, denied - needs to try tizanidine and baclofen  Will consult with covering provider on next step.  Spoke with pt, she's willing to try Baclofen, Nayeli to send in on 05/15/25.

## 2025-05-15 NOTE — TELEPHONE ENCOUNTER
Left vm for patient to call back. Needing to know if she has ever been on tizanidine or baclofen? Her insurance is denying the Metaxalone for having not tried the above named medication first. If any one gets this call back, please send me a message with her answers so I can move forward with her appeal. Thank you!!

## 2025-05-16 ENCOUNTER — OFFICE VISIT (OUTPATIENT)
Dept: FAMILY MEDICINE CLINIC | Facility: CLINIC | Age: 65
End: 2025-05-16
Payer: MEDICARE

## 2025-05-16 VITALS
DIASTOLIC BLOOD PRESSURE: 61 MMHG | HEIGHT: 63 IN | HEART RATE: 72 BPM | WEIGHT: 144.8 LBS | OXYGEN SATURATION: 97 % | TEMPERATURE: 97.4 F | BODY MASS INDEX: 25.66 KG/M2 | SYSTOLIC BLOOD PRESSURE: 91 MMHG

## 2025-05-16 DIAGNOSIS — S40.812A ABRASION OF LEFT UPPER EXTREMITY, INITIAL ENCOUNTER: Primary | ICD-10-CM
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
## 2025-05-19 DIAGNOSIS — E03.9 ACQUIRED HYPOTHYROIDISM: ICD-10-CM

## 2025-05-19 RX ORDER — LEVOTHYROXINE SODIUM 50 UG/1
50 TABLET ORAL DAILY
Qty: 90 TABLET | Refills: 1 | Status: SHIPPED | OUTPATIENT
Start: 2025-05-19

## 2025-05-20 ENCOUNTER — OFFICE VISIT (OUTPATIENT)
Dept: FAMILY MEDICINE CLINIC | Facility: CLINIC | Age: 65
End: 2025-05-20
Payer: MEDICARE

## 2025-05-20 VITALS
HEART RATE: 102 BPM | DIASTOLIC BLOOD PRESSURE: 58 MMHG | OXYGEN SATURATION: 98 % | HEIGHT: 63 IN | SYSTOLIC BLOOD PRESSURE: 100 MMHG | WEIGHT: 144.8 LBS | TEMPERATURE: 98 F | BODY MASS INDEX: 25.66 KG/M2 | RESPIRATION RATE: 17 BRPM

## 2025-05-20 DIAGNOSIS — M54.50 CHRONIC LOW BACK PAIN, UNSPECIFIED BACK PAIN LATERALITY, UNSPECIFIED WHETHER SCIATICA PRESENT: Primary | ICD-10-CM

## 2025-05-20 DIAGNOSIS — M51.362 DEGENERATION OF INTERVERTEBRAL DISC OF LUMBAR REGION WITH DISCOGENIC BACK PAIN AND LOWER EXTREMITY PAIN: ICD-10-CM

## 2025-05-20 DIAGNOSIS — G89.29 CHRONIC LOW BACK PAIN, UNSPECIFIED BACK PAIN LATERALITY, UNSPECIFIED WHETHER SCIATICA PRESENT: Primary | ICD-10-CM

## 2025-05-20 DIAGNOSIS — Z87.891 PERSONAL HISTORY OF NICOTINE DEPENDENCE: ICD-10-CM

## 2025-05-20 PROCEDURE — 1160F RVW MEDS BY RX/DR IN RCRD: CPT | Performed by: NURSE PRACTITIONER

## 2025-05-20 PROCEDURE — 1125F AMNT PAIN NOTED PAIN PRSNT: CPT | Performed by: NURSE PRACTITIONER

## 2025-05-20 PROCEDURE — 99213 OFFICE O/P EST LOW 20 MIN: CPT | Performed by: NURSE PRACTITIONER

## 2025-05-20 PROCEDURE — 1159F MED LIST DOCD IN RCRD: CPT | Performed by: NURSE PRACTITIONER

## 2025-05-20 RX ORDER — HYDROCODONE BITARTRATE AND ACETAMINOPHEN 7.5; 325 MG/1; MG/1
1 TABLET ORAL EVERY 6 HOURS PRN
Qty: 28 TABLET | Refills: 0 | Status: SHIPPED | OUTPATIENT
Start: 2025-05-20 | End: 2025-05-27

## 2025-05-20 NOTE — PROGRESS NOTES
"Chief Complaint   Patient presents with    Joint Pain     Compliance visit     Med Refill     Norco        Subjective   Denisa Michelle is a 64 y.o. female who presents today for the following     Joint Pain  Symptoms include joint pain, change in stool, congestion, fatigue, myalgias and weakness.    Pertinent negative symptoms include no abdominal pain, no anorexia, no chest pain, no chills, no cough, no fever, no headaches, no joint swelling, no nausea, no neck pain, no numbness, no rash, no sore throat, no swollen glands, no dysuria, no vertigo, no visual change and no vomiting.     pain  Symptoms are: chronic.   Onset was 1 to 5 years.   Symptoms occur: daily.  Symptoms include: joint pain, change in stool, nasal congestion, fatigue, myalgias and weakness.   Pertinent negative symptoms include no abdominal pain, no anorexia, no chest pain, no chills, no cough, no fever, no headaches, no joint swelling, no nausea, no neck pain, no numbness, no rash, no sore throat, no swollen glands, no dysuria, no vertigo, no visual change and no vomiting.   Treatment and/or Medications comments include: Baclofen   Additional information: On4/10/25 I had an order for hydrocodone rid as needed. I’m out of that and feel I need it refilled due to the daily pain I experience       Allergies   Allergen Reactions    Amoxicillin Diarrhea    Clindamycin/Lincomycin Hives and Rash         OBJECTIVE:  Vitals:    05/20/25 1032   BP: 100/58   BP Location: Left arm   Patient Position: Sitting   Cuff Size: Adult   Pulse: 102   Resp: 17   Temp: 98 °F (36.7 °C)   TempSrc: Infrared   SpO2: 98%   Weight: 65.7 kg (144 lb 12.8 oz)   Height: 160 cm (63\")     Physical Exam  Vitals and nursing note reviewed.   Constitutional:       Appearance: Normal appearance.   Cardiovascular:      Rate and Rhythm: Normal rate and regular rhythm.   Pulmonary:      Effort: Pulmonary effort is normal.      Breath sounds: Normal breath sounds. No wheezing or rhonchi. "   Neurological:      Mental Status: She is alert.   Psychiatric:         Mood and Affect: Mood normal.         Behavior: Behavior normal.                    ASSESSMENT/ PLAN:    Diagnoses and all orders for this visit:    1. Chronic low back pain, unspecified back pain laterality, unspecified whether sciatica present (Primary)  -     HYDROcodone-acetaminophen (NORCO) 7.5-325 MG per tablet; Take 1 tablet by mouth Every 6 (Six) Hours As Needed for Moderate Pain for up to 7 days.  Dispense: 28 tablet; Refill: 0    2. Degeneration of intervertebral disc of lumbar region with discogenic back pain and lower extremity pain  -     HYDROcodone-acetaminophen (NORCO) 7.5-325 MG per tablet; Take 1 tablet by mouth Every 6 (Six) Hours As Needed for Moderate Pain for up to 7 days.  Dispense: 28 tablet; Refill: 0    Aayush and UDS up to date and appropriate.   Procedures       Follow-up: Return in about 3 months (around 8/20/2025) for chronic pain .      Nayeli Arango, APRN 5/21/2025 10:53 CDT  This note was electronically signed.

## 2025-05-21 ENCOUNTER — RESULTS FOLLOW-UP (OUTPATIENT)
Dept: FAMILY MEDICINE CLINIC | Facility: CLINIC | Age: 65
End: 2025-05-21
Payer: MEDICARE

## 2025-05-21 NOTE — TELEPHONE ENCOUNTER
Attempted contact with pt, no answer. [facility] [facility] sent to pt

## 2025-05-23 ENCOUNTER — TELEPHONE (OUTPATIENT)
Dept: FAMILY MEDICINE CLINIC | Facility: CLINIC | Age: 65
End: 2025-05-23
Payer: MEDICARE

## 2025-05-23 NOTE — TELEPHONE ENCOUNTER
I left the patient a voicemail to return my call and let me know if she has had her eye exam within the last year so we can get those records for her EMR.

## 2025-05-28 ENCOUNTER — OFFICE VISIT (OUTPATIENT)
Age: 65
End: 2025-05-28
Payer: MEDICARE

## 2025-05-28 VITALS — BODY MASS INDEX: 26.33 KG/M2 | HEIGHT: 63 IN | WEIGHT: 148.6 LBS

## 2025-05-28 DIAGNOSIS — M25.552 LEFT HIP PAIN: Primary | ICD-10-CM

## 2025-05-28 DIAGNOSIS — S43.101D AC SEPARATION, RIGHT, SUBSEQUENT ENCOUNTER: ICD-10-CM

## 2025-05-28 DIAGNOSIS — M70.62 GREATER TROCHANTERIC BURSITIS OF LEFT HIP: ICD-10-CM

## 2025-05-28 PROCEDURE — 3017F COLORECTAL CA SCREEN DOC REV: CPT | Performed by: ORTHOPAEDIC SURGERY

## 2025-05-28 PROCEDURE — G8427 DOCREV CUR MEDS BY ELIG CLIN: HCPCS | Performed by: ORTHOPAEDIC SURGERY

## 2025-05-28 PROCEDURE — 1159F MED LIST DOCD IN RCRD: CPT | Performed by: ORTHOPAEDIC SURGERY

## 2025-05-28 PROCEDURE — G8419 CALC BMI OUT NRM PARAM NOF/U: HCPCS | Performed by: ORTHOPAEDIC SURGERY

## 2025-05-28 PROCEDURE — 99213 OFFICE O/P EST LOW 20 MIN: CPT | Performed by: ORTHOPAEDIC SURGERY

## 2025-05-28 PROCEDURE — 1123F ACP DISCUSS/DSCN MKR DOCD: CPT | Performed by: ORTHOPAEDIC SURGERY

## 2025-05-28 PROCEDURE — 20610 DRAIN/INJ JOINT/BURSA W/O US: CPT | Performed by: ORTHOPAEDIC SURGERY

## 2025-05-28 PROCEDURE — G8400 PT W/DXA NO RESULTS DOC: HCPCS | Performed by: ORTHOPAEDIC SURGERY

## 2025-05-28 PROCEDURE — 1090F PRES/ABSN URINE INCON ASSESS: CPT | Performed by: ORTHOPAEDIC SURGERY

## 2025-05-28 PROCEDURE — 4004F PT TOBACCO SCREEN RCVD TLK: CPT | Performed by: ORTHOPAEDIC SURGERY

## 2025-05-28 RX ORDER — LIDOCAINE HYDROCHLORIDE 10 MG/ML
6 INJECTION, SOLUTION INFILTRATION; PERINEURAL ONCE
Status: COMPLETED | OUTPATIENT
Start: 2025-05-28 | End: 2025-05-28

## 2025-05-28 RX ORDER — BETAMETHASONE SODIUM PHOSPHATE AND BETAMETHASONE ACETATE 3; 3 MG/ML; MG/ML
12 INJECTION, SUSPENSION INTRA-ARTICULAR; INTRALESIONAL; INTRAMUSCULAR; SOFT TISSUE ONCE
Status: COMPLETED | OUTPATIENT
Start: 2025-05-28 | End: 2025-05-28

## 2025-05-28 RX ORDER — BACLOFEN 10 MG/1
5 TABLET ORAL 3 TIMES DAILY PRN
COMMUNITY
Start: 2025-05-15

## 2025-05-28 RX ORDER — FLUCONAZOLE 150 MG/1
TABLET ORAL
COMMUNITY
Start: 2025-02-20

## 2025-05-28 RX ORDER — MICONAZOLE NITRATE 20 MG/G
1 CREAM TOPICAL 2 TIMES DAILY
COMMUNITY
Start: 2025-02-20

## 2025-05-28 RX ADMIN — LIDOCAINE HYDROCHLORIDE 6 ML: 10 INJECTION, SOLUTION INFILTRATION; PERINEURAL at 13:32

## 2025-05-28 RX ADMIN — BETAMETHASONE SODIUM PHOSPHATE AND BETAMETHASONE ACETATE 12 MG: 3; 3 INJECTION, SUSPENSION INTRA-ARTICULAR; INTRALESIONAL; INTRAMUSCULAR; SOFT TISSUE at 13:31
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
## 2025-06-05 ENCOUNTER — OFFICE VISIT (OUTPATIENT)
Dept: FAMILY MEDICINE CLINIC | Facility: CLINIC | Age: 65
End: 2025-06-05
Payer: MEDICARE

## 2025-06-05 VITALS
DIASTOLIC BLOOD PRESSURE: 84 MMHG | SYSTOLIC BLOOD PRESSURE: 138 MMHG | HEIGHT: 63 IN | HEART RATE: 83 BPM | BODY MASS INDEX: 27.29 KG/M2 | RESPIRATION RATE: 16 BRPM | TEMPERATURE: 98.1 F | WEIGHT: 154 LBS | OXYGEN SATURATION: 97 %

## 2025-06-05 DIAGNOSIS — G89.29 CHRONIC LOW BACK PAIN, UNSPECIFIED BACK PAIN LATERALITY, UNSPECIFIED WHETHER SCIATICA PRESENT: Primary | ICD-10-CM

## 2025-06-05 DIAGNOSIS — M51.362 DEGENERATION OF INTERVERTEBRAL DISC OF LUMBAR REGION WITH DISCOGENIC BACK PAIN AND LOWER EXTREMITY PAIN: ICD-10-CM

## 2025-06-05 DIAGNOSIS — M54.50 CHRONIC LOW BACK PAIN, UNSPECIFIED BACK PAIN LATERALITY, UNSPECIFIED WHETHER SCIATICA PRESENT: Primary | ICD-10-CM

## 2025-06-05 PROCEDURE — 1160F RVW MEDS BY RX/DR IN RCRD: CPT | Performed by: NURSE PRACTITIONER

## 2025-06-05 PROCEDURE — 1159F MED LIST DOCD IN RCRD: CPT | Performed by: NURSE PRACTITIONER

## 2025-06-05 PROCEDURE — 1125F AMNT PAIN NOTED PAIN PRSNT: CPT | Performed by: NURSE PRACTITIONER

## 2025-06-05 PROCEDURE — 99213 OFFICE O/P EST LOW 20 MIN: CPT | Performed by: NURSE PRACTITIONER

## 2025-06-05 RX ORDER — HYDROCODONE BITARTRATE AND ACETAMINOPHEN 7.5; 325 MG/1; MG/1
1 TABLET ORAL EVERY 8 HOURS PRN
Qty: 90 TABLET | Refills: 0 | Status: SHIPPED | OUTPATIENT
Start: 2025-06-05

## 2025-06-05 RX ORDER — BACLOFEN 10 MG/1
10 TABLET ORAL 3 TIMES DAILY
Qty: 90 TABLET | Refills: 2 | Status: SHIPPED | OUTPATIENT
Start: 2025-06-05

## 2025-06-09 DIAGNOSIS — K21.9 GERD WITHOUT ESOPHAGITIS: ICD-10-CM

## 2025-06-09 NOTE — TELEPHONE ENCOUNTER
Caller: Denisa Michelle EUFEMIA    Relationship: Self    Best call back number:     [phone]       Requested Prescriptions:   Requested Prescriptions     Pending Prescriptions Disp Refills    omeprazole (priLOSEC) 20 MG capsule 180 capsule 1     Sig: Take 1 capsule by mouth 2 (Two) Times a Day.        Pharmacy where request should be sent: [facility] [address] [facility] [phone] [facility] [phone]      Last office visit with prescribing clinician: 6/5/2025   Last telemedicine visit with prescribing clinician: Visit date not found   Next office visit with prescribing clinician: 7/30/2025     Additional details provided by patient:     Does the patient have less than a 3 day supply:  [x] Yes  [] No    Would you like a call back once the refill request has been completed: [x] Yes [] No    If the office needs to give you a call back, can they leave a voicemail: [x] Yes [] No    Flash Ivan Rep   06/09/25 15:19 CDT

## 2025-06-10 DIAGNOSIS — M25.50 MULTIPLE JOINT PAIN: ICD-10-CM

## 2025-06-10 RX ORDER — OMEPRAZOLE 20 MG/1
20 CAPSULE, DELAYED RELEASE ORAL 2 TIMES DAILY
Qty: 180 CAPSULE | Refills: 1 | Status: SHIPPED | OUTPATIENT
Start: 2025-06-10

## 2025-06-10 NOTE — TELEPHONE ENCOUNTER
Caller: Aldo Michellealena FALL    Relationship: Self    Best call back number: [phone]    Requested Prescriptions:   Requested Prescriptions     Pending Prescriptions Disp Refills    pregabalin (LYRICA) 150 MG capsule 90 capsule 2     Sig: Take 1 capsule by mouth Every Evening.        Pharmacy where request should be sent: [facility] [place], [place] - [address] [phone] [facility] [phone] [facility]     Last office visit with prescribing clinician: 6/5/2025   Last telemedicine visit with prescribing clinician: Visit date not found   Next office visit with prescribing clinician: 7/30/2025         Does the patient have less than a 3 day supply:  [x] Yes  [] No    Would you like a call back once the refill request has been completed: [] Yes [x] No        Flash Garcia Rep   06/10/25 14:24 CDT

## 2025-06-11 RX ORDER — PREGABALIN 150 MG/1
150 CAPSULE ORAL EVERY EVENING
Qty: 90 CAPSULE | Refills: 2 | Status: SHIPPED | OUTPATIENT
Start: 2025-06-11

## 2025-07-30 ENCOUNTER — OFFICE VISIT (OUTPATIENT)
Dept: FAMILY MEDICINE CLINIC | Facility: CLINIC | Age: 65
End: 2025-07-30
Payer: MEDICARE

## 2025-07-30 VITALS
BODY MASS INDEX: 26.58 KG/M2 | HEART RATE: 87 BPM | RESPIRATION RATE: 15 BRPM | WEIGHT: 150 LBS | DIASTOLIC BLOOD PRESSURE: 64 MMHG | TEMPERATURE: 98.7 F | OXYGEN SATURATION: 97 % | HEIGHT: 63 IN | SYSTOLIC BLOOD PRESSURE: 108 MMHG

## 2025-07-30 DIAGNOSIS — K59.04 CHRONIC IDIOPATHIC CONSTIPATION: ICD-10-CM

## 2025-07-30 DIAGNOSIS — M25.512 ACUTE PAIN OF LEFT SHOULDER: Primary | ICD-10-CM

## 2025-07-30 DIAGNOSIS — S46.012A TRAUMATIC TEAR OF LEFT ROTATOR CUFF, UNSPECIFIED TEAR EXTENT, INITIAL ENCOUNTER: ICD-10-CM

## 2025-07-30 DIAGNOSIS — M24.9 DERANGEMENT OF LEFT SHOULDER JOINT: ICD-10-CM

## 2025-07-30 PROCEDURE — 1125F AMNT PAIN NOTED PAIN PRSNT: CPT | Performed by: NURSE PRACTITIONER

## 2025-07-30 PROCEDURE — 99213 OFFICE O/P EST LOW 20 MIN: CPT | Performed by: NURSE PRACTITIONER

## 2025-07-30 RX ORDER — BISACODYL 10 MG
10 SUPPOSITORY, RECTAL RECTAL DAILY
Qty: 10 SUPPOSITORY | Refills: 5 | Status: SHIPPED | OUTPATIENT
Start: 2025-07-30

## 2025-07-30 RX ORDER — HYDROCODONE BITARTRATE AND ACETAMINOPHEN 7.5; 325 MG/1; MG/1
1 TABLET ORAL EVERY 6 HOURS PRN
Qty: 30 TABLET | Refills: 0 | Status: SHIPPED | OUTPATIENT
Start: 2025-07-30

## 2025-07-30 NOTE — PROGRESS NOTES
"Answers submitted by the patient for this visit:  Chronic Condition Follow-up (Submitted on 7/27/2025)  Chief Complaint: PCP follow-up  other: Yes  Medication compliance: all of the time  Side effects: fatigue, joint pain  Treatment barriers: no complaince problems  Exercise: most days  Chief Complaint   Patient presents with    Back Pain     Follow up    Fall    Med Refill        Subjective   Denisa Michelle is a 65 y.o. female who presents today for the following: pain after fall    HPI   Patient fell over her dog last week and has severe left shoulder pain. She is worried now that she can take her dog out to walk that she will injure her shoulder further. She describes the pain as stabbing and constant. She is unable to move her arm.    Allergies   Allergen Reactions    Amoxicillin Diarrhea    Clindamycin/Lincomycin Hives and Rash         OBJECTIVE:  Vitals:    07/30/25 0835   BP: 108/64   BP Location: Left arm   Patient Position: Sitting   Cuff Size: Adult   Pulse: 87   Resp: 15   Temp: 98.7 °F (37.1 °C)   TempSrc: Infrared   SpO2: 97%   Weight: 68 kg (150 lb)   Height: 160 cm (63\")     Physical Exam  Musculoskeletal:      Left shoulder: Tenderness and crepitus present. Decreased range of motion. Decreased strength.      Comments: Even with assistance, patient cannot tolerate her left arm being raised or put behind her back due to too much pain. She exhibits the signs of a rotator cuff tear                    ASSESSMENT/ PLAN:    Diagnoses and all orders for this visit:    1. Acute pain of left shoulder (Primary)  -     HYDROcodone-acetaminophen (NORCO) 7.5-325 MG per tablet; Take 1 tablet by mouth Every 6 (Six) Hours As Needed for Moderate Pain.  Dispense: 30 tablet; Refill: 0  -     MRI Shoulder Left Without Contrast; Future    2. Derangement of left shoulder joint  -     MRI Shoulder Left Without Contrast; Future    3. Traumatic tear of left rotator cuff, unspecified tear extent, initial encounter  -     " HYDROcodone-acetaminophen (NORCO) 7.5-325 MG per tablet; Take 1 tablet by mouth Every 6 (Six) Hours As Needed for Moderate Pain.  Dispense: 30 tablet; Refill: 0  -     MRI Shoulder Left Without Contrast; Future    4. Chronic idiopathic constipation  -     bisacodyl (DULCOLAX) 10 MG suppository; Insert 1 suppository into the rectum Daily.  Dispense: 10 suppository; Refill: 5      Procedures     Management Plan:   If pain continues, imaging will be considered. I would normally order plain x-rays but I feel she has a rotator cuff tear and needs an MRI.   An After Visit Summary was printed and given to the patient at discharge.    Follow-up: Return if symptoms worsen or fail to improve.         NEW Trivedi 7/31/2025 07:12 CDT  This note was electronically signed.

## 2025-08-04 ENCOUNTER — PROCEDURE VISIT (OUTPATIENT)
Dept: FAMILY MEDICINE CLINIC | Facility: CLINIC | Age: 65
End: 2025-08-04
Payer: MEDICARE

## 2025-08-04 VITALS
RESPIRATION RATE: 17 BRPM | DIASTOLIC BLOOD PRESSURE: 64 MMHG | HEART RATE: 67 BPM | BODY MASS INDEX: 23.74 KG/M2 | OXYGEN SATURATION: 96 % | WEIGHT: 134 LBS | TEMPERATURE: 98 F | HEIGHT: 63 IN | SYSTOLIC BLOOD PRESSURE: 134 MMHG

## 2025-08-04 DIAGNOSIS — S41.112D: Primary | ICD-10-CM

## 2025-08-06 ENCOUNTER — OFFICE VISIT (OUTPATIENT)
Dept: FAMILY MEDICINE CLINIC | Facility: CLINIC | Age: 65
End: 2025-08-06
Payer: MEDICARE

## 2025-08-06 VITALS
SYSTOLIC BLOOD PRESSURE: 128 MMHG | HEART RATE: 82 BPM | DIASTOLIC BLOOD PRESSURE: 70 MMHG | WEIGHT: 134 LBS | RESPIRATION RATE: 15 BRPM | TEMPERATURE: 97.5 F | HEIGHT: 63 IN | BODY MASS INDEX: 23.74 KG/M2 | OXYGEN SATURATION: 94 %

## 2025-08-06 DIAGNOSIS — M54.41 ACUTE BILATERAL LOW BACK PAIN WITH RIGHT-SIDED SCIATICA: Primary | ICD-10-CM

## 2025-08-06 DIAGNOSIS — M54.9 UPPER BACK PAIN: ICD-10-CM

## 2025-08-06 PROCEDURE — 1125F AMNT PAIN NOTED PAIN PRSNT: CPT | Performed by: NURSE PRACTITIONER

## 2025-08-06 PROCEDURE — 1160F RVW MEDS BY RX/DR IN RCRD: CPT | Performed by: NURSE PRACTITIONER

## 2025-08-06 PROCEDURE — 1159F MED LIST DOCD IN RCRD: CPT | Performed by: NURSE PRACTITIONER

## 2025-08-06 PROCEDURE — 99213 OFFICE O/P EST LOW 20 MIN: CPT | Performed by: NURSE PRACTITIONER

## 2025-08-06 RX ORDER — HYDROCODONE BITARTRATE AND ACETAMINOPHEN 10; 325 MG/1; MG/1
1 TABLET ORAL EVERY 4 HOURS PRN
Qty: 30 TABLET | Refills: 0 | Status: SHIPPED | OUTPATIENT
Start: 2025-08-06

## 2025-08-28 ENCOUNTER — OFFICE VISIT (OUTPATIENT)
Dept: FAMILY MEDICINE CLINIC | Facility: CLINIC | Age: 65
End: 2025-08-28
Payer: MEDICARE

## 2025-08-28 VITALS
HEIGHT: 63 IN | DIASTOLIC BLOOD PRESSURE: 68 MMHG | WEIGHT: 131 LBS | RESPIRATION RATE: 15 BRPM | BODY MASS INDEX: 23.21 KG/M2 | SYSTOLIC BLOOD PRESSURE: 128 MMHG | TEMPERATURE: 97.9 F

## 2025-08-28 DIAGNOSIS — M54.41 ACUTE BILATERAL LOW BACK PAIN WITH RIGHT-SIDED SCIATICA: ICD-10-CM

## 2025-08-28 DIAGNOSIS — M25.552 LEFT HIP PAIN: Primary | ICD-10-CM

## 2025-08-28 DIAGNOSIS — M54.9 UPPER BACK PAIN: ICD-10-CM

## 2025-08-28 RX ORDER — HYDROCODONE BITARTRATE AND ACETAMINOPHEN 10; 325 MG/1; MG/1
1 TABLET ORAL EVERY 4 HOURS PRN
Qty: 30 TABLET | Refills: 0 | Status: SHIPPED | OUTPATIENT
Start: 2025-08-28

## (undated) DEVICE — MODEL BT2000 P/N 700287-012KIT CONTENTS: MANIFOLD WITH SALINE AND CONTRAST PORTS, SALINE TUBING WITH SPIKE AND HAND SYRINGE, TRANSDUCER: Brand: BT2000 AUTOMATED MANIFOLD KIT

## (undated) DEVICE — APPL CHLORAPREP HI/LITE 26ML ORNG

## (undated) DEVICE — PK CATH CARD 30 CA/4

## (undated) DEVICE — DISPOSABLE SURGICAL CABLE

## (undated) DEVICE — SKIN PREP TRAY W/CHG: Brand: MEDLINE INDUSTRIES, INC.

## (undated) DEVICE — 3M™ STERI-STRIP™ REINFORCED ADHESIVE SKIN CLOSURES, R1546, 1/4 IN X 4 IN (6 MM X 100 MM), 10 STRIPS/ENVELOPE: Brand: 3M™ STERI-STRIP™

## (undated) DEVICE — FR5 INFINITI MULTIPAC: Brand: INFINITI

## (undated) DEVICE — GW STARTER FXD CORE J .035 3X150CM 3MM

## (undated) DEVICE — INTRO TEAR AWAY/LVD W/SD PRT 6F 13CM

## (undated) DEVICE — SOL NS 500ML

## (undated) DEVICE — SOLIDIFIER LIQUI LOC PLUS 2000CC

## (undated) DEVICE — SOL IRR NACL 0.9PCT BT 1000ML

## (undated) DEVICE — ELECTRD PAD DEFIB A/

## (undated) DEVICE — PK PM 30

## (undated) DEVICE — PINNACLE INTRODUCER SHEATH: Brand: PINNACLE

## (undated) DEVICE — DRSNG PRESS SAFEGUARD

## (undated) DEVICE — MODEL AT P65, P/N 701554-001KIT CONTENTS: HAND CONTROLLER, 3-WAY HIGH-PRESSURE STOPCOCK WITH ROTATING END AND PREMIUM HIGH-PRESSURE TUBING: Brand: ANGIOTOUCH® KIT

## (undated) DEVICE — 3M™ IOBAN™ 2 ANTIMICROBIAL INCISE DRAPE 6650EZ: Brand: IOBAN™ 2

## (undated) DEVICE — MYNXGRIP 6F/7F: Brand: MYNXGRIP

## (undated) DEVICE — CANN CO2/O2 NASL A/

## (undated) DEVICE — SKIN AFFIX SURG ADHESIVE 72/CS 0.55ML: Brand: MEDLINE